# Patient Record
Sex: FEMALE | Race: WHITE | Employment: FULL TIME | URBAN - METROPOLITAN AREA
[De-identification: names, ages, dates, MRNs, and addresses within clinical notes are randomized per-mention and may not be internally consistent; named-entity substitution may affect disease eponyms.]

---

## 2017-01-09 NOTE — PROGRESS NOTES
SUBJECTIVE:     CC: Radha Wilson is an 36 year old woman who presents for preventive health visit.     Healthy Habits:    Do you get at least three servings of calcium containing foods daily (dairy, green leafy vegetables, etc.)? yes    Amount of exercise or daily activities, outside of work: 0    Problems taking medications regularly No    Medication side effects: No    Have you had an eye exam in the past two years? no    Do you see a dentist twice per year? yes    Do you have sleep apnea, excessive snoring or daytime drowsiness?daytime drowsiness         Follow up depression - ran out of paxil due to lack of tablets in short term supply given - taking 2 per day and only given enough for 1 per day.  Feeling the effects.      Today's PHQ-2 Score:   PHQ-2 (  [employer]) 2017 1/3/2017   Q1: Little interest or pleasure in doing things - -   Q2: Feeling down, depressed or hopeless - -   PHQ-2 Score - -   Little interest or pleasure in doing things Several days Not at all   Feeling down, depressed or hopeless Not at all Not at all   PHQ-2 Score 1 0       Abuse: Current or Past(Physical, Sexual or Emotional)- No  Do you feel safe in your environment - Yes    Social History   Substance Use Topics     Smoking status: Never Smoker      Smokeless tobacco: Never Used      Comment: Nonsmoking household     Alcohol Use: No      Comment: Minimal     The patient does not drink >3 drinks per day nor >7 drinks per week.      Reviewed orders with patient.  Reviewed health maintenance and updated orders accordingly - Yes         Patient's last menstrual period was 2016.     Fasting: No   Td:Tdap        Last 3 Pap Results:   PAP (no units)   Date Value   2015 NIL   2011 NIL        HPV: neg           Cholesterol: CHOL      235   2016  HDL       46   2016  LDL      122   2016  TRIG      336   2016  CHOLHDLRATIO      4.2   2011      MMG: NA  Dexa:  No     Flex/colo:  No      Seat Belt: Yes    Sunscreen use: Yes   Calcium Intake: adeq   Health Care Directive: Yes   Sexually Active: Yes     Current contraception: oral contraceptives  History of abnormal Pap smear: Yes: see [facility]  Family history of colon/breast/ovarian cancer: No  Regular self breast exam: Yes  History of abnormal mammogram: No       Mammo Decision Support:  Mammogram not appropriate for this patient based on age.    Pertinent mammograms are reviewed under the imaging tab.  History of abnormal Pap smear:       ROS:  C: NEGATIVE for fever, chills, change in weight  I: NEGATIVE for worrisome rashes, moles or lesions  E: NEGATIVE for vision changes or irritation  ENT: NEGATIVE for ear, mouth and throat problems  R: NEGATIVE for significant cough or SOB  B: NEGATIVE for masses, tenderness or discharge  CV: NEGATIVE for chest pain, palpitations or peripheral edema  GI: NEGATIVE for nausea, abdominal pain, heartburn, or change in bowel habits  : NEGATIVE for unusual urinary or vaginal symptoms. Periods are regular.  M: NEGATIVE for significant arthralgias or myalgia  N: NEGATIVE for weakness, dizziness or paresthesias  P: NEGATIVE for changes in mood or affect    Problem list, Medication list, Allergies, and Medical/Social/Surgical histories reviewed in [facility] and updated as appropriate.  Labs reviewed in EPIC  BP Readings from Last 3 Encounters:   01/17/17 134/70   01/04/16 127/81   09/05/15 131/84    Wt Readings from Last 3 Encounters:   01/17/17 190 lb (86.183 kg)   01/04/16 187 lb (84.823 kg)   09/05/15 185 lb (83.915 kg)                  Patient Active Problem List   Diagnosis     CARDIOVASCULAR SCREENING; LDL GOAL LESS THAN 160     Mild major depression (H)     Anxiety     Advanced directives, counseling/discussion     Myopia     Past Surgical History   Procedure Laterality Date     Hc tooth extraction w/forcep       Tonsillectomy & adenoidectomy       Exam of vagina,colposcopy  9/20/06     Benign     Appendectomy   "5/19/12     [facility]     Abdomen surgery         Social History   Substance Use Topics     Smoking status: Never Smoker      Smokeless tobacco: Never Used      Comment: Nonsmoking household     Alcohol Use: No      Comment: Minimal     Family History   Problem Relation Age of Onset     CANCER Mother      lymphoma     Lipids Father      HEART DISEASE Maternal Grandfather      70s     Cancer - colorectal Paternal Grandmother      80s     Glaucoma Paternal Grandmother      Hyperlipidemia Father      Other Cancer Mother      1994-current     Anesthesia Reaction Other      lung problems during appendectomy         Current Outpatient Prescriptions   Medication Sig Dispense Refill     PARoxetine (PAXIL) 20 MG tablet Take 2 tablets (40 mg) by mouth At Bedtime 180 tablet 1     levonorgestrel-ethinyl estradiol (QUASENSE) 0.15-0.03 MG per tablet Take 1 tablet by mouth daily Pt on continuous ocp, skip week 4 of packs 1-3, take week 4 of pack 4 112 tablet 3     hydroquinone 4 % CREA 5% cream       [DISCONTINUED] citalopram (CELEXA) 20 MG tablet Take 1 tablet by mouth daily. 90 tablet 1     OBJECTIVE:     /70 mmHg  Pulse 103  Temp(Src) 98.8  F (37.1  C) (Oral)  Ht 5' 3\" (1.6 m)  Wt 190 lb (86.183 kg)  BMI 33.67 kg/m2  LMP 11/30/2016  EXAM:  GENERAL: healthy, alert and no distress  EYES: Eyes grossly normal to inspection, PERRL and conjunctivae and sclerae normal  HENT: ear canals and TM's normal, nose and mouth without ulcers or lesions  NECK: no adenopathy, no asymmetry, masses, or scars and thyroid normal to palpation  RESP: lungs clear to auscultation - no rales, rhonchi or wheezes  BREAST: normal without masses, tenderness or nipple discharge and no palpable axillary masses or adenopathy  CV: regular rate and rhythm, normal S1 S2, no S3 or S4, no murmur, click or rub, no peripheral edema and peripheral pulses strong  ABDOMEN: soft, nontender, no hepatosplenomegaly, no masses and bowel sounds normal  MS: no " "gross musculoskeletal defects noted, no edema  SKIN: no suspicious lesions or rashes  NEURO: Normal strength and tone, mentation intact and speech normal  PSYCH: mentation appears normal, affect normal/bright    ASSESSMENT/PLAN:     (Z00.00) Routine general medical examination at a health care facility  (primary encounter diagnosis)  Comment: preventive needs reviewed  Plan: see orders in Epic.   Dicussed need for wt loss    (F41.9) Anxiety  (F33.0) Major depressive disorder, recurrent episode, mild (H)  Comment: not fully controlled due to low dose  Plan: PARoxetine (PAXIL) 20 MG tablet        Return to 40 mg daily.   Refill x 6 months     (Z30.41) Encounter for surveillance of contraceptive pills  Comment: stable  Plan: levonorgestrel-ethinyl estradiol (QUASENSE)         0.15-0.03 MG per tablet        Refill x 1 yr     (Z23) Need for prophylactic vaccination and inoculation against influenza  Comment: due  Plan: FLU VAC, SPLIT VIRUS IM > 3 YO (QUADRIVALENT)         [13545], Vaccine Administration, Initial         [81049]              COUNSELING:   Reviewed preventive health counseling, as reflected in patient instructions  Special attention given to:        Regular exercise       Healthy diet/nutrition    BP Screening:   Last 3 BP Readings:    BP Readings from Last 3 Encounters:   01/17/17 134/70   01/04/16 127/81   09/05/15 131/84       The following was recommended to the patient:  Re-screen BP within a year and recommended lifestyle modifications       reports that she has never smoked. She has never used smokeless tobacco.    Estimated body mass index is 33.67 kg/(m^2) as calculated from the following:    Height as of this encounter: 5' 3\" (1.6 m).    Weight as of this encounter: 190 lb (86.183 kg).   Weight management plan: Discussed healthy diet and exercise guidelines and patient will follow up in 12 months in clinic to re-evaluate.    Counseling Resources:  ATP IV Guidelines  Pooled Cohorts Equation " Calculator  Breast Cancer Risk Calculator  FRAX Risk Assessment  ICSI Preventive Guidelines  Dietary Guidelines for Americans, 2010  [email or web address]'s MyPlate  ASA Prophylaxis  Lung CA Screening    Katy Moore MD  [facility]

## 2017-01-09 NOTE — PATIENT INSTRUCTIONS
Increase activity, taper your pop intake.    Preventive Health Recommendations  Female Ages 26 - 39  Yearly exam:   See your health care provider every year in order to    Review health changes.     Discuss preventive care.      Review your medicines if you your doctor has prescribed any.    Until age 30: Get a Pap test every three years (more often if you have had an abnormal result).    After age 30: Talk to your doctor about whether you should have a Pap test every 3 years or have a Pap test with HPV screening every 5 years.   You do not need a Pap test if your uterus was removed (hysterectomy) and you have not had cancer.  You should be tested each year for STDs (sexually transmitted diseases), if you're at risk.   Talk to your provider about how often to have your cholesterol checked.  If you are at risk for diabetes, you should have a diabetes test (fasting glucose).  Shots: Get a flu shot each year. Get a tetanus shot every 10 years.   Nutrition:     Eat at least 5 servings of fruits and vegetables each day.    Eat whole-grain bread, whole-wheat pasta and brown rice instead of white grains and rice.    Talk to your provider about Calcium and Vitamin D.     Lifestyle    Exercise at least 150 minutes a week (30 minutes a day, 5 days of the week). This will help you control your weight and prevent disease.    Limit alcohol to one drink per day.    No smoking.     Wear sunscreen to prevent skin cancer.    See your dentist every six months for an exam and cleaning.

## 2017-01-09 NOTE — PROGRESS NOTES
"   SUBJECTIVE:     CC: Radha Wilson is an 36 year old woman who presents for preventive health visit.     Healthy Habits:    Do you get at least three servings of calcium containing foods daily (dairy, green leafy vegetables, etc.)? {YES/NO, DAIRY INTAKE:039063::\"yes\"}    Amount of exercise or daily activities, outside of work: {AMOUNT EXERCISE:603071}    Problems taking medications regularly {Yes /No default:116837::\"No\"}    Medication side effects: {Yes /No default.:674764::\"No\"}    Have you had an eye exam in the past two years? {YESNOBLANK:480483}    Do you see a dentist twice per year? {YESNOBLANK:057529}    Do you have sleep apnea, excessive snoring or daytime drowsiness?{YESNOBLANK:613681}    {Outside tests to abstract? :568969}    {additional problems to add:020945}    Today's PHQ-2 Score:   PHQ-2 ( 1999 [employer]) 1/14/2017 1/3/2017   Q1: Little interest or pleasure in doing things - -   Q2: Feeling down, depressed or hopeless - -   PHQ-2 Score - -   Little interest or pleasure in doing things Several days Not at all   Feeling down, depressed or hopeless Not at all Not at all   PHQ-2 Score 1 0     {PHQ-2 LOOK IN ASSESSMENTS :374918}  Abuse: Current or Past(Physical, Sexual or Emotional)- {YES/NO/NA:609387}  Do you feel safe in your environment - {YES/NO/NA:964469}    Social History   Substance Use Topics     Smoking status: Never Smoker      Smokeless tobacco: Never Used      Comment: Nonsmoking household     Alcohol Use: No      Comment: Minimal     {ETOH AUDIT:131430}      Reviewed orders with patient.  Reviewed health maintenance and updated orders accordingly - {Yes/No:697654::\"Yes\"}  G 0 P 0   No LMP recorded.     Fasting: {YES:722682}   Td: tdap 2010       Last 3 Pap Results:   PAP (no units)   Date Value   01/06/2015 NIL   12/16/2011 NIL        HPV: neg 2015          Cholesterol: CHOL      235   9/19/2016  HDL       46   9/19/2016  LDL      122   9/19/2016  TRIG      336   9/19/2016  CHOLHDLRATIO    "   4.2   11/14/2011      MMG: NA  Dexa:  NA     Flex/colo: NA      Seat Belt: {YES:819408}   Sunscreen use: {YES:589491}  Calcium Intake: ***   Health Care Directive: Yes   Sexually Active: Yes     Current contraception: oral contraceptives  History of abnormal Pap smear: Yes: see [facility]  Family history of colon/breast/ovarian cancer: Yes: see [facility]  Regular self breast exam: Yes  History of abnormal mammogram: No      Mammo Decision Support:  Mammogram not appropriate for this patient based on age.    Pertinent mammograms are reviewed under the imaging tab.  ROS:  {normal premenopausal female:119529}    Problem list, Medication list, Allergies, and Medical/Social/Surgical histories reviewed in [facility] and updated as appropriate.  Labs reviewed in EPIC  BP Readings from Last 3 Encounters:   01/04/16 127/81   09/05/15 131/84   01/06/15 130/80    Wt Readings from Last 3 Encounters:   01/04/16 187 lb (84.823 kg)   09/05/15 185 lb (83.915 kg)   01/06/15 184 lb (83.462 kg)                  Patient Active Problem List   Diagnosis     CARDIOVASCULAR SCREENING; LDL GOAL LESS THAN 160     Mild major depression (H)     Anxiety     Advanced directives, counseling/discussion     Myopia     Past Surgical History   Procedure Laterality Date     Hc tooth extraction w/forcep       Tonsillectomy & adenoidectomy       Exam of vagina,colposcopy  9/20/06     Benign     Appendectomy  5/19/12     [facility]     Abdomen surgery         Social History   Substance Use Topics     Smoking status: Never Smoker      Smokeless tobacco: Never Used      Comment: Nonsmoking household     Alcohol Use: No      Comment: Minimal     Family History   Problem Relation Age of Onset     CANCER Mother      lymphoma     Lipids Father      HEART DISEASE Maternal Grandfather      70s     Cancer - colorectal Paternal Grandmother      80s     Glaucoma Paternal Grandmother      Hyperlipidemia Father      Other Cancer Mother      1994-current     Anesthesia Reaction  "Other      lung problems during appendectomy         Current Outpatient Prescriptions   Medication Sig Dispense Refill     PARoxetine (PAXIL) 20 MG tablet Take 2 tablets (40 mg) by mouth At Bedtime Need to keep upcoming appointment for further refills 10 tablet 0     levonorgestrel-ethinyl estradiol (QUASENSE) 0.15-0.03 MG per tablet Take 1 tablet by mouth daily Pt on continuous ocp, skip week 4 of packs 1-3, take week 4 of pack 4 112 tablet 3     [DISCONTINUED] citalopram (CELEXA) 20 MG tablet Take 1 tablet by mouth daily. 90 tablet 1     OBJECTIVE:     There were no vitals taken for this visit.  EXAM:  {Exam Choices:543048}    ASSESSMENT/PLAN:     {Diag Picklist:617794}    COUNSELING:   Reviewed preventive health counseling, as reflected in patient instructions  Special attention given to:        Regular exercise       Healthy diet/nutrition    {Blood Pressure Screenin}     reports that she has never smoked. She has never used smokeless tobacco.    Estimated body mass index is 33.38 kg/(m^2) as calculated from the following:    Height as of 16: 5' 2.75\" (1.594 m).    Weight as of 16: 187 lb (84.823 kg).   {Weight Management Plan needed for ACO:596558}    Counseling Resources:  ATP IV Guidelines  Pooled Cohorts Equation Calculator  Breast Cancer Risk Calculator  FRAX Risk Assessment  ICSI Preventive Guidelines  Dietary Guidelines for Americans,   USDA's MyPlate  ASA Prophylaxis  Lung CA Screening    Katy Moore MD  [facility]  "

## 2017-01-17 ENCOUNTER — OFFICE VISIT (OUTPATIENT)
Dept: FAMILY MEDICINE | Facility: CLINIC | Age: 37
End: 2017-01-17
Payer: COMMERCIAL

## 2017-01-17 VITALS
HEART RATE: 103 BPM | TEMPERATURE: 98.8 F | WEIGHT: 190 LBS | SYSTOLIC BLOOD PRESSURE: 134 MMHG | BODY MASS INDEX: 33.66 KG/M2 | HEIGHT: 63 IN | DIASTOLIC BLOOD PRESSURE: 70 MMHG

## 2017-01-17 DIAGNOSIS — Z00.00 ROUTINE GENERAL MEDICAL EXAMINATION AT A HEALTH CARE FACILITY: Primary | ICD-10-CM

## 2017-01-17 DIAGNOSIS — F41.9 ANXIETY: ICD-10-CM

## 2017-01-17 DIAGNOSIS — Z30.41 ENCOUNTER FOR SURVEILLANCE OF CONTRACEPTIVE PILLS: ICD-10-CM

## 2017-01-17 DIAGNOSIS — Z23 NEED FOR PROPHYLACTIC VACCINATION AND INOCULATION AGAINST INFLUENZA: ICD-10-CM

## 2017-01-17 DIAGNOSIS — F33.0 MAJOR DEPRESSIVE DISORDER, RECURRENT EPISODE, MILD (H): ICD-10-CM

## 2017-01-17 PROCEDURE — 90686 IIV4 VACC NO PRSV 0.5 ML IM: CPT | Performed by: FAMILY MEDICINE

## 2017-01-17 PROCEDURE — 90471 IMMUNIZATION ADMIN: CPT | Performed by: FAMILY MEDICINE

## 2017-01-17 PROCEDURE — 99395 PREV VISIT EST AGE 18-39: CPT | Mod: 25 | Performed by: FAMILY MEDICINE

## 2017-01-17 RX ORDER — LEVONORGESTREL AND ETHINYL ESTRADIOL 0.15-0.03
1 KIT ORAL DAILY
Qty: 112 TABLET | Refills: 3 | Status: SHIPPED | OUTPATIENT
Start: 2017-01-17 | End: 2018-01-17

## 2017-01-17 RX ORDER — PAROXETINE 20 MG/1
40 TABLET, FILM COATED ORAL AT BEDTIME
Qty: 180 TABLET | Refills: 1 | Status: SHIPPED | OUTPATIENT
Start: 2017-01-17 | End: 2017-06-14

## 2017-01-17 RX ORDER — HYDROQUINONE 40 MG/G
CREAM TOPICAL
COMMUNITY
End: 2017-08-24

## 2017-01-17 ASSESSMENT — ANXIETY QUESTIONNAIRES
6. BECOMING EASILY ANNOYED OR IRRITABLE: MORE THAN HALF THE DAYS
IF YOU CHECKED OFF ANY PROBLEMS ON THIS QUESTIONNAIRE, HOW DIFFICULT HAVE THESE PROBLEMS MADE IT FOR YOU TO DO YOUR WORK, TAKE CARE OF THINGS AT HOME, OR GET ALONG WITH OTHER PEOPLE: VERY DIFFICULT
1. FEELING NERVOUS, ANXIOUS, OR ON EDGE: SEVERAL DAYS
5. BEING SO RESTLESS THAT IT IS HARD TO SIT STILL: MORE THAN HALF THE DAYS
2. NOT BEING ABLE TO STOP OR CONTROL WORRYING: NEARLY EVERY DAY
7. FEELING AFRAID AS IF SOMETHING AWFUL MIGHT HAPPEN: NOT AT ALL
GAD7 TOTAL SCORE: 12
3. WORRYING TOO MUCH ABOUT DIFFERENT THINGS: MORE THAN HALF THE DAYS

## 2017-01-17 ASSESSMENT — PATIENT HEALTH QUESTIONNAIRE - PHQ9: 5. POOR APPETITE OR OVEREATING: MORE THAN HALF THE DAYS

## 2017-01-17 NOTE — PROGRESS NOTES
Injectable Influenza Immunization Documentation    1.  Is the person to be vaccinated sick today?  No    2. Does the person to be vaccinated have an allergy to eggs or to a component of the vaccine?  No    3. Has the person to be vaccinated today ever had a serious reaction to influenza vaccine in the past?  No    4. Has the person to be vaccinated ever had Guillain-Mosby syndrome?  No     Form completed by   Negrita Webster CMA

## 2017-01-17 NOTE — NURSING NOTE
"Chief Complaint   Patient presents with     Physical       Initial /86 mmHg  Pulse 103  Temp(Src) 98.8  F (37.1  C) (Oral)  Ht 5' 3\" (1.6 m)  Wt 190 lb (86.183 kg)  BMI 33.67 kg/m2  LMP 11/30/2016 Estimated body mass index is 33.67 kg/(m^2) as calculated from the following:    Height as of this encounter: 5' 3\" (1.6 m).    Weight as of this encounter: 190 lb (86.183 kg).  BP completed using cuff size: karen Webster CMA      "

## 2017-01-17 NOTE — MR AVS SNAPSHOT
After Visit Summary   1/17/2017    Radha Wilson    MRN: [id number]           Patient Information     Date Of Birth          1980        Visit Information        Provider Department      1/17/2017 12:15 PM Katy Moore MD [facility]        Today's Diagnoses     Routine general medical examination at a health care facility    -  1     Anxiety         Major depressive disorder, recurrent episode, mild (H)         Encounter for surveillance of contraceptive pills         Need for prophylactic vaccination and inoculation against influenza           Care Instructions      Increase activity, taper your pop intake.    Preventive Health Recommendations  Female Ages 26 - 39  Yearly exam:   See your health care provider every year in order to    Review health changes.     Discuss preventive care.      Review your medicines if you your doctor has prescribed any.    Until age 30: Get a Pap test every three years (more often if you have had an abnormal result).    After age 30: Talk to your doctor about whether you should have a Pap test every 3 years or have a Pap test with HPV screening every 5 years.   You do not need a Pap test if your uterus was removed (hysterectomy) and you have not had cancer.  You should be tested each year for STDs (sexually transmitted diseases), if you're at risk.   Talk to your provider about how often to have your cholesterol checked.  If you are at risk for diabetes, you should have a diabetes test (fasting glucose).  Shots: Get a flu shot each year. Get a tetanus shot every 10 years.   Nutrition:     Eat at least 5 servings of fruits and vegetables each day.    Eat whole-grain bread, whole-wheat pasta and brown rice instead of white grains and rice.    Talk to your provider about Calcium and Vitamin D.     Lifestyle    Exercise at least 150 minutes a week (30 minutes a day, 5 days of the week). This will help you control your weight and prevent  "disease.    Limit alcohol to one drink per day.    No smoking.     Wear sunscreen to prevent skin cancer.    See your dentist every six months for an exam and cleaning.            Follow-ups after your visit        Who to contact     If you have questions or need follow up information about today's clinic visit or your schedule please contact [facility] directly at [phone].  Normal or non-critical lab and imaging results will be communicated to you by MyChart, letter or phone within 4 business days after the clinic has received the results. If you do not hear from us within 7 days, please contact the clinic through [employer] or phone. If you have a critical or abnormal lab result, we will notify you by phone as soon as possible.  Submit refill requests through [employer] or call your pharmacy and they will forward the refill request to us. Please allow 3 business days for your refill to be completed.          Additional Information About Your Visit        [employer] Information     [employer] gives you secure access to your electronic health record. If you see a primary care provider, you can also send messages to your care team and make appointments. If you have questions, please call your primary care clinic.  If you do not have a primary care provider, please call [phone] and they will assist you.        Care EveryWhere ID     This is your Care EveryWhere ID. This could be used by other organizations to access your [place] medical records  [id number]        Your Vitals Were     Pulse Temperature Height BMI (Body Mass Index) Last Period       103 98.8  F (37.1  C) (Oral) 5' 3\" (1.6 m) 33.67 kg/m2 11/30/2016        Blood Pressure from Last 3 Encounters:   01/17/17 145/86   01/04/16 127/81   09/05/15 131/84    Weight from Last 3 Encounters:   01/17/17 190 lb (86.183 kg)   01/04/16 187 lb (84.823 kg)   09/05/15 185 lb (83.915 kg)              We Performed the Following     DEPRESSION ACTION PLAN (DAP) "     FLU VAC, SPLIT VIRUS IM > 3 YO (QUADRIVALENT) [87303]     Vaccine Administration, Initial [92857]          Today's Medication Changes          These changes are accurate as of: 1/17/17 12:49 PM.  If you have any questions, ask your nurse or doctor.               These medicines have changed or have updated prescriptions.        Dose/Directions    PARoxetine 20 MG tablet   Commonly known as:  PAXIL   This may have changed:  additional instructions   Used for:  Anxiety, Major depressive disorder, recurrent episode, mild (H)   Changed by:  Katy Moore MD        Dose:  40 mg   Take 2 tablets (40 mg) by mouth At Bedtime   Quantity:  180 tablet   Refills:  1            Where to get your medicines      These medications were sent to [facility] [id number] - [address]  AT [place] [address]  [address] [facility] [address]     Phone:  [phone]    - levonorgestrel-ethinyl estradiol 0.15-0.03 MG per tablet  - PARoxetine 20 MG tablet             Primary Care Provider Office Phone # Fax #    Katy Moore [phone]       [facility] [address] [facility] [address]        Thank you!     Thank you for choosing [facility]  for your care. Our goal is always to provide you with excellent care. Hearing back from our patients is one way we can continue to improve our services. Please take a few minutes to complete the written survey that you may receive in the mail after your visit with us. Thank you!             Your Updated Medication List - Protect others around you: Learn how to safely use, store and throw away your medicines at www.disposemymeds.org.          This list is accurate as of: 1/17/17 12:49 PM.  Always use your most recent med list.                   Brand Name Dispense Instructions for use    hydroquinone 4 % Crea      5% cream       levonorgestrel-ethinyl estradiol 0.15-0.03 MG per tablet     QUASENSE    112 tablet    Take 1 tablet by mouth daily Pt on continuous ocp, skip week 4 of packs 1-3, take week 4 of pack 4       PARoxetine 20 MG tablet    PAXIL    180 tablet    Take 2 tablets (40 mg) by mouth At Bedtime

## 2017-01-17 NOTE — Clinical Note
My Depression Action Plan  Name: Radha Wilson   Date of Birth 1980  Date: 1/17/2017    My doctor: Katy Moore   My clinic: [facility]  [id number] [place] [id number] [phone]          GREEN    ZONE   Good Control    What it looks like:     Things are going generally well. You have normal up s and down s. You may even feel depressed from time to time, but bad moods usually last less than a day.   What you need to do:  1. Continue to care for yourself (see self care plan)  2. Check your depression survival kit and update it as needed  3. Follow your physician s recommendations including any medication.  4. Do not stop taking medication unless you consult with your physician first.           YELLOW         ZONE Getting Worse    What it looks like:     Depression is starting to interfere with your life.     It may be hard to get out of bed; you may be starting to isolate yourself from others.    Symptoms of depression are starting to last most all day and this has happened for several days.     You may have suicidal thoughts but they are not constant.   What you need to do:     1. Call your care team, your response to treatment will improve if you keep your care team informed of your progress. Yellow periods are signs an adjustment may need to be made.     2. Continue your self-care, even if you have to fake it!    3. Talk to someone in your support network    4. Open up your depression survival kit           RED    ZONE Medical Alert - Get Help    What it looks like:     Depression is seriously interfering with your life.     You may experience these or other symptoms: You can t get out of bed most days, can t work or engage in other necessary activities, you have trouble taking care of basic hygiene, or basic responsibilities, thoughts of suicide or death that will not go away, self-injurious behavior.     What you need to do:  1. Call your care team and  request a same-day appointment. If they are not available (weekends or after hours) call your local crisis line, emergency room or 911.      Electronically signed by: Negrita Monahan, January 17, 2017    Depression Self Care Plan / Survival Kit    Self-Care for Depression  Here s the deal. Your body and mind are really not as separate as most people think.  What you do and think affects how you feel and how you feel influences what you do and think. This means if you do things that people who feel good do, it will help you feel better.  Sometimes this is all it takes.  There is also a place for medication and therapy depending on how severe your depression is, so be sure to consult with your medical provider and/ or Behavioral Health Consultant if your symptoms are worsening or not improving.     In order to better manage my stress, I will:    Exercise  Get some form of exercise, every day. This will help reduce pain and release endorphins, the  feel good  chemicals in your brain. This is almost as good as taking antidepressants!  This is not the same as joining a gym and then never going! (they count on that by the way ) It can be as simple as just going for a walk or doing some gardening, anything that will get you moving.      Hygiene   Maintain good hygiene (Get out of bed in the morning, Make your bed, Brush your teeth, Take a shower, and Get dressed like you were going to work, even if you are unemployed).  If your clothes don't fit try to get ones that do.    Diet  I will strive to eat foods that are good for me, drink plenty of water, and avoid excessive sugar, caffeine, alcohol, and other mood-altering substances.  Some foods that are helpful in depression are: complex carbohydrates, B vitamins, flaxseed, fish or fish oil, fresh fruits and vegetables.    Psychotherapy  I agree to participate in Individual Therapy (if recommended).    Medication  If prescribed medications, I agree to take them.  Missing  doses can result in serious side effects.  I understand that drinking alcohol, or other illicit drug use, may cause potential side effects.  I will not stop my medication abruptly without first discussing it with my provider.    Staying Connected With Others  I will stay in touch with my friends, family members, and my primary care provider/team.    Use your imagination  Be creative.  We all have a creative side; it doesn t matter if it s oil painting, sand castles, or mud pies! This will also kick up the endorphins.    Witness Beauty  (AKA stop and smell the roses) Take a look outside, even in mid-winter. Notice colors, textures. Watch the squirrels and birds.     Service to others  Be of service to others.  There is always someone else in need.  By helping others we can  get out of ourselves  and remember the really important things.  This also provides opportunities for practicing all the other parts of the program.    Humor  Laugh and be silly!  Adjust your TV habits for less news and crime-drama and more comedy.    Control your stress  Try breathing deep, massage therapy, biofeedback, and meditation. Find time to relax each day.     My support system    Clinic Contact:  Phone number:    Contact 1:  Phone number:    Contact 2:  Phone number:    Restoration/:  Phone number:    Therapist:  Phone number:    Local crisis center:    Phone number:    Other community support:  Phone number:

## 2017-01-18 ASSESSMENT — ANXIETY QUESTIONNAIRES: GAD7 TOTAL SCORE: 12

## 2017-01-18 ASSESSMENT — PATIENT HEALTH QUESTIONNAIRE - PHQ9: SUM OF ALL RESPONSES TO PHQ QUESTIONS 1-9: 14

## 2017-06-07 ENCOUNTER — TELEPHONE (OUTPATIENT)
Dept: FAMILY MEDICINE | Facility: CLINIC | Age: 37
End: 2017-06-07

## 2017-06-07 DIAGNOSIS — F41.9 ANXIETY: ICD-10-CM

## 2017-06-07 DIAGNOSIS — F33.0 MAJOR DEPRESSIVE DISORDER, RECURRENT EPISODE, MILD (H): ICD-10-CM

## 2017-06-07 NOTE — TELEPHONE ENCOUNTER
PHQ-9 due now for patient ( 5-7 months from index date)  Index date:       1-17-17  Index PHQ9 :   14  FU start date:   6-17-17  FU End date :   8-17-17    RN- Contact patient for PHQ-9. Remission considered if follow up PHQ-9 less than 5.  If greater than 5 consider follow up appointment, e-visit for medication follow up and evaluation.

## 2017-06-14 RX ORDER — PAROXETINE 20 MG/1
40 TABLET, FILM COATED ORAL AT BEDTIME
Qty: 60 TABLET | Refills: 0 | Status: SHIPPED | OUTPATIENT
Start: 2017-06-14 | End: 2017-07-10

## 2017-06-14 NOTE — TELEPHONE ENCOUNTER
PHQ-9 SCORE 10/24/2016 1/17/2017 6/14/2017   Total Score - - -   Total Score MyChart 4 (Minimal depression) - -   Total Score - 14 6     Patient's main complaint is sleep.  Patient requests appointment to discuss.  Appointment is schedule for 6/28/17.   Patient requests short term refill until appointment.   Medication refilled per RN protocol.  The patient/parent agrees with the plan and verbalized good understanding.  Trini Palomino RN

## 2017-06-15 ASSESSMENT — PATIENT HEALTH QUESTIONNAIRE - PHQ9: SUM OF ALL RESPONSES TO PHQ QUESTIONS 1-9: 6

## 2017-06-28 ENCOUNTER — E-VISIT (OUTPATIENT)
Dept: FAMILY MEDICINE | Facility: CLINIC | Age: 37
End: 2017-06-28
Payer: COMMERCIAL

## 2017-06-28 DIAGNOSIS — R53.83 OTHER FATIGUE: ICD-10-CM

## 2017-06-28 DIAGNOSIS — F32.0 MILD MAJOR DEPRESSION (H): Primary | ICD-10-CM

## 2017-06-28 DIAGNOSIS — F41.9 ANXIETY: ICD-10-CM

## 2017-06-28 PROCEDURE — 99444 ZZC PHYSICIAN ONLINE EVALUATION & MANAGEMENT SERVICE: CPT | Performed by: FAMILY MEDICINE

## 2017-06-28 NOTE — MR AVS SNAPSHOT
After Visit Summary   6/28/2017    Radha Wilson    MRN: [id number]           Patient Information     Date Of Birth          1980        Visit Information        Provider Department      6/28/2017 12:07 PM Katy Moore MD [facility]        Today's Diagnoses     Mild major depression (H)    -  1    Anxiety        Other fatigue           Follow-ups after your visit        Who to contact     If you have questions or need follow up information about today's clinic visit or your schedule please contact [facility] directly at [phone].  Normal or non-critical lab and imaging results will be communicated to you by [employer] [facility], letter or phone within 4 business days after the clinic has received the results. If you do not hear from us within 7 days, please contact the clinic through [facility] or phone. If you have a critical or abnormal lab result, we will notify you by phone as soon as possible.  Submit refill requests through [facility] or call your pharmacy and they will forward the refill request to us. Please allow 3 business days for your refill to be completed.          Additional Information About Your Visit        MyChart Information     [facility] gives you secure access to your electronic health record. If you see a primary care provider, you can also send messages to your care team and make appointments. If you have questions, please call your primary care clinic.  If you do not have a primary care provider, please call [phone] and they will assist you.        Care EveryWhere ID     This is your Care EveryWhere ID. This could be used by other organizations to access your [facility] medical records  [id number]         Blood Pressure from Last 3 Encounters:   01/17/17 134/70   01/04/16 127/81   09/05/15 131/84    Weight from Last 3 Encounters:   01/17/17 190 lb (86.2 kg)   01/04/16 187 lb (84.8 kg)   09/05/15 185 lb (83.9 kg)              Today, you had the  following     No orders found for display       Primary Care Provider Office Phone # Fax #    Katy Moore [phone]       [facility] [address] [facility] [address]        Equal Access to Services     HARI CORREA : Hadkandice garrison mirzao Soomaali, waaxda luqadaha, qaybta kaalmada adeegyada, anastasia sandiin hayaaenrique navaavery arias burt levi. So [facility] [phone].    ATENCIÓN: Si habla español, tiene a brewster disposición servicios gratuitos de asistencia lingüística. [facility] [phone].    We comply with applicable federal civil rights laws and [place] laws. We do not discriminate on the basis of race, color, national origin, age, disability sex, sexual orientation or gender identity.            Thank you!     Thank you for choosing [facility]  for your care. Our goal is always to provide you with excellent care. Hearing back from our patients is one way we can continue to improve our services. Please take a few minutes to complete the written survey that you may receive in the mail after your visit with us. Thank you!             Your Updated Medication List - Protect others around you: Learn how to safely use, store and throw away your medicines at www.disposemymeds.org.          This list is accurate as of: 6/28/17 11:59 PM.  Always use your most recent med list.                   Brand Name Dispense Instructions for use Diagnosis    hydroquinone 4 % Crea      5% cream        levonorgestrel-ethinyl estradiol 0.15-0.03 MG per tablet    QUASENSE    112 tablet    Take 1 tablet by mouth daily Pt on continuous ocp, skip week 4 of packs 1-3, take week 4 of pack 4    Encounter for surveillance of contraceptive pills       PARoxetine 20 MG tablet    PAXIL    60 tablet    Take 2 tablets (40 mg) by mouth At Bedtime Need to keep upcoming appointment for further refills    Anxiety, Major depressive disorder, recurrent episode, mild (H)

## 2017-07-06 ENCOUNTER — TELEPHONE (OUTPATIENT)
Dept: FAMILY MEDICINE | Facility: CLINIC | Age: 37
End: 2017-07-06

## 2017-07-07 ENCOUNTER — MYC MEDICAL ADVICE (OUTPATIENT)
Dept: FAMILY MEDICINE | Facility: CLINIC | Age: 37
End: 2017-07-07

## 2017-07-07 DIAGNOSIS — F33.0 MAJOR DEPRESSIVE DISORDER, RECURRENT EPISODE, MILD (H): ICD-10-CM

## 2017-07-07 DIAGNOSIS — F41.9 ANXIETY: ICD-10-CM

## 2017-07-07 DIAGNOSIS — R53.83 OTHER FATIGUE: Primary | ICD-10-CM

## 2017-07-10 RX ORDER — PAROXETINE 20 MG/1
50 TABLET, FILM COATED ORAL AT BEDTIME
Qty: 90 TABLET | Refills: 1 | Status: SHIPPED | OUTPATIENT
Start: 2017-07-10 | End: 2017-09-12

## 2017-07-21 ENCOUNTER — MYC MEDICAL ADVICE (OUTPATIENT)
Dept: FAMILY MEDICINE | Facility: CLINIC | Age: 37
End: 2017-07-21

## 2017-07-21 DIAGNOSIS — Z13.6 CARDIOVASCULAR SCREENING; LDL GOAL LESS THAN 160: ICD-10-CM

## 2017-07-21 DIAGNOSIS — Z13.6 CARDIOVASCULAR SCREENING; LDL GOAL LESS THAN 160: Primary | ICD-10-CM

## 2017-07-21 DIAGNOSIS — R73.01 ELEVATED FASTING GLUCOSE: ICD-10-CM

## 2017-07-21 DIAGNOSIS — R53.83 OTHER FATIGUE: ICD-10-CM

## 2017-07-21 LAB
ERYTHROCYTE [DISTWIDTH] IN BLOOD BY AUTOMATED COUNT: 12.7 % (ref 10–15)
HCT VFR BLD AUTO: 41.9 % (ref 35–47)
HGB BLD-MCNC: 14.3 G/DL (ref 11.7–15.7)
MCH RBC QN AUTO: 30.8 PG (ref 26.5–33)
MCHC RBC AUTO-ENTMCNC: 34.1 G/DL (ref 31.5–36.5)
MCV RBC AUTO: 90 FL (ref 78–100)
PLATELET # BLD AUTO: 274 10E9/L (ref 150–450)
RBC # BLD AUTO: 4.64 10E12/L (ref 3.8–5.2)
WBC # BLD AUTO: 8 10E9/L (ref 4–11)

## 2017-07-21 PROCEDURE — 80061 LIPID PANEL: CPT | Performed by: FAMILY MEDICINE

## 2017-07-21 PROCEDURE — 36415 COLL VENOUS BLD VENIPUNCTURE: CPT | Performed by: FAMILY MEDICINE

## 2017-07-21 PROCEDURE — 85027 COMPLETE CBC AUTOMATED: CPT | Performed by: FAMILY MEDICINE

## 2017-07-21 PROCEDURE — 84443 ASSAY THYROID STIM HORMONE: CPT | Performed by: FAMILY MEDICINE

## 2017-07-21 PROCEDURE — 82947 ASSAY GLUCOSE BLOOD QUANT: CPT | Performed by: FAMILY MEDICINE

## 2017-07-22 LAB — TSH SERPL DL<=0.005 MIU/L-ACNC: 2.35 MU/L (ref 0.4–4)

## 2017-07-24 LAB
CHOLEST SERPL-MCNC: 238 MG/DL
CHOLEST SERPL-MCNC: NORMAL MG/DL
GLUCOSE SERPL-MCNC: 137 MG/DL (ref 70–99)
GLUCOSE SERPL-MCNC: NORMAL MG/DL (ref 70–99)
HDLC SERPL-MCNC: 46 MG/DL
HDLC SERPL-MCNC: NORMAL MG/DL
LDLC SERPL CALC-MCNC: 128 MG/DL (ref 0–130)
LDLC SERPL CALC-MCNC: NORMAL MG/DL (ref 0–130)
NONHDLC SERPL-MCNC: 192 MG/DL
NONHDLC SERPL-MCNC: NORMAL MG/DL
TRIGL SERPL-MCNC: 319 MG/DL
TRIGL SERPL-MCNC: NORMAL MG/DL

## 2017-07-28 DIAGNOSIS — R73.01 ELEVATED FASTING GLUCOSE: ICD-10-CM

## 2017-07-28 LAB — HBA1C MFR BLD: 7.1 % (ref 4.3–6)

## 2017-07-28 PROCEDURE — 83036 HEMOGLOBIN GLYCOSYLATED A1C: CPT | Performed by: FAMILY MEDICINE

## 2017-07-28 PROCEDURE — 36415 COLL VENOUS BLD VENIPUNCTURE: CPT | Performed by: FAMILY MEDICINE

## 2017-08-03 ENCOUNTER — MYC MEDICAL ADVICE (OUTPATIENT)
Dept: NURSING | Facility: CLINIC | Age: 37
End: 2017-08-03

## 2017-08-03 ENCOUNTER — TELEPHONE (OUTPATIENT)
Dept: FAMILY MEDICINE | Facility: CLINIC | Age: 37
End: 2017-08-03

## 2017-08-03 ASSESSMENT — PATIENT HEALTH QUESTIONNAIRE - PHQ9
SUM OF ALL RESPONSES TO PHQ QUESTIONS 1-9: 8
10. IF YOU CHECKED OFF ANY PROBLEMS, HOW DIFFICULT HAVE THESE PROBLEMS MADE IT FOR YOU TO DO YOUR WORK, TAKE CARE OF THINGS AT HOME, OR GET ALONG WITH OTHER PEOPLE: NOT DIFFICULT AT ALL
SUM OF ALL RESPONSES TO PHQ QUESTIONS 1-9: 8

## 2017-08-03 NOTE — TELEPHONE ENCOUNTER
PHQ-9 SCORE 10/24/2016 1/17/2017 6/14/2017   Total Score - - -   Total Score MyChart 4 (Minimal depression) - -   Total Score - 14 6     MyChart message sent to pt to complete PHQ9  Trini Palomino RN

## 2017-08-04 ASSESSMENT — PATIENT HEALTH QUESTIONNAIRE - PHQ9: SUM OF ALL RESPONSES TO PHQ QUESTIONS 1-9: 8

## 2017-08-04 NOTE — TELEPHONE ENCOUNTER
PHQ-9 SCORE 1/17/2017 6/14/2017 8/3/2017   Total Score - - -   Total Score MyChart - - 8 (Mild depression)   Total Score 14 6 8     MyChart sent to patient if wanting to follow up.  See message for details.  .Trini Palomino RN

## 2017-08-04 NOTE — TELEPHONE ENCOUNTER
TRINHI.  Next 5 appointments (look out 90 days)     Aug 24, 2017  1:35 PM CDT   Sid Cruz with Katy Moore MD   [facility]    [address] [facility] [address]   [phone]                Trini Palomino RN

## 2017-08-22 NOTE — PROGRESS NOTES
SUBJECTIVE:   Radha Wilson is a 37 year old female who presents to clinic today for the following health issues:      Hyperlipidemia Follow-Up      Rate your low fat/cholesterol diet?: fair    Taking statin?  No    Other lipid medications/supplements?:  none    Discuss HGBA1C results A1C up   Pt would like to discuss LLQ abdominal pain when she twists or moves   Would like to get the shingles vaccine pt checked with insurance it is covered       Amount of exercise or physical activity: 2 days week     Problems taking medications regularly: No    Medication side effects: none  Diet: carbohydrate counting and cutting sugar           Problem list and histories reviewed & adjusted, as indicated.  Additional history: as documented    Patient Active Problem List   Diagnosis     CARDIOVASCULAR SCREENING; LDL GOAL LESS THAN 160     Mild major depression (H)     Anxiety     Advanced directives, counseling/discussion     Myopia     Past Surgical History:   Procedure Laterality Date     ABDOMEN SURGERY       APPENDECTOMY  5/19/12    [facility]     EXAM OF VAGINA,COLPOSCOPY  9/20/06    Benign     HC TOOTH EXTRACTION W/FORCEP       TONSILLECTOMY & ADENOIDECTOMY         Social History   Substance Use Topics     Smoking status: Never Smoker     Smokeless tobacco: Never Used      Comment: Nonsmoking household     Alcohol use No      Comment: Minimal     Family History   Problem Relation Age of Onset     CANCER Mother      lymphoma     Other Cancer Mother      1994-current     Lipids Father      Hyperlipidemia Father      HEART DISEASE Maternal Grandfather      70s     Cancer - colorectal Paternal Grandmother      80s     Glaucoma Paternal Grandmother      Anesthesia Reaction Other      lung problems during appendectomy         Current Outpatient Prescriptions   Medication Sig Dispense Refill     blood glucose monitoring (NO BRAND SPECIFIED) meter device kit Use to test blood sugar 2 times daily or as directed. Preferred  blood glucose meter OR supplies to accompany: Blood Glucose Monitor Brands: per insurance. 1 kit 0     blood glucose monitoring (NO BRAND SPECIFIED) test strip Use to test blood sugar 2 times daily or as directed. To accompany: Blood Glucose Monitor Brands: per insurance. 100 strip 6     blood glucose calibration (NO BRAND SPECIFIED) solution To accompany: Blood Glucose Monitor Brands: per insurance. 1 Bottle 3     thin (NO BRAND SPECIFIED) lancets Use with lanceting device. To accompany: Blood Glucose Monitor Brands: per insurance. 200 each 6     alcohol swab prep pads Use to swab area of injection/mike as directed. 100 each 3     PARoxetine (PAXIL) 20 MG tablet Take 2.5 tablets (50 mg) by mouth At Bedtime Need to keep upcoming appointment for further refills 90 tablet 1     levonorgestrel-ethinyl estradiol (QUASENSE) 0.15-0.03 MG per tablet Take 1 tablet by mouth daily Pt on continuous ocp, skip week 4 of packs 1-3, take week 4 of pack 4 112 tablet 3     [DISCONTINUED] citalopram (CELEXA) 20 MG tablet Take 1 tablet by mouth daily. 90 tablet 1     Recent Labs   Lab Test  08/24/17   1413  07/28/17   1053  07/21/17   1014  09/19/16   0736  11/14/11   0951   11/06/09   1013   A1C   --   7.1*   --    --    --    --    --    LDL   --    --   128  Test canceled after completion  CORRECTED ON 07/24 AT 1113: PREVIOUSLY REPORTED  Above desirable:  100 129   mg/dl Borderline High:  130 159 mg/dL High:             160 189 mg/dL Very   high:       >189 mg/dl    122*  129   < >  140*   HDL   --    --   46*  Test canceled after completion  CORRECTED ON 07/24 AT 1113: PREVIOUSLY REPORTED AS 46    46*  52   < >  63   TRIG   --    --   319*  Test canceled after completion  CORRECTED ON 07/24 AT 1113: PREVIOUSLY REPORTED  Borderline high:  150 199   mg/dl High:             200 499 mg/dl Very high:       >499 mg/dl    336*  203*   < >  115   ALT   --    --    --    --    --    --   22   CR  0.83   --    --    --    --     --   0.81   GFRESTIMATED  76   --    --    --    --    --   82   GFRESTBLACK  >90   --    --    --    --    --   >90   POTASSIUM  4.0   --    --    --    --    --   4.3   TSH   --    --   2.35   --    --    --    --     < > = values in this interval not displayed.      BP Readings from Last 3 Encounters:   08/24/17 129/75   01/17/17 134/70   01/04/16 127/81    Wt Readings from Last 3 Encounters:   08/24/17 177 lb (80.3 kg)   01/17/17 190 lb (86.2 kg)   01/04/16 187 lb (84.8 kg)                          Reviewed and updated as needed this visit by clinical staffTobacco  Allergies  Meds  Problems       Reviewed and updated as needed this visit by Provider  Allergies  Meds  Problems         ROS:  Constitutional, HEENT, cardiovascular, pulmonary, gi and gu systems are negative, except as otherwise noted.      OBJECTIVE:   /75  Pulse 89  Temp 99  F (37.2  C) (Oral)  Wt 177 lb (80.3 kg)  BMI 31.35 kg/m2  Body mass index is 31.35 kg/(m^2).  GENERAL: healthy, alert and no distress  EYES: Eyes grossly normal to inspection, PERRL and conjunctivae and sclerae normal  NECK: no adenopathy, no asymmetry, masses, or scars and thyroid normal to palpation  RESP: lungs clear to auscultation - no rales, rhonchi or wheezes  CV: regular rate and rhythm, normal S1 S2, no S3 or S4, no murmur, click or rub, no peripheral edema and peripheral pulses strong  MS: no gross musculoskeletal defects noted, no edema  SKIN: no suspicious lesions or rashes  PSYCH: mentation appears normal, affect normal/bright              Foot Exam: Areas of numbess as outlined above  Right Foot none  Left Foot none  normal DP and PT pulses, no trophic changes or ulcerative lesions, normal sensory exam and normal monofilament exam   Diagnostic Test Results:  none     ASSESSMENT/PLAN:     (E11.9,  Z79.4) Controlled type 2 diabetes mellitus without complication, with long-term current use of insulin (H)  (primary encounter diagnosis)  Comment: A1C  in diabetic range  Plan: Albumin Random Urine Quantitative with Creat         Ratio, Basic metabolic panel  (Ca, Cl, CO2,         Creat, Gluc, K, Na, BUN), DIABETES EDUCATOR         REFERRAL, blood glucose monitoring (NO BRAND         SPECIFIED) meter device kit, blood glucose         monitoring (NO BRAND SPECIFIED) test strip,         blood glucose calibration (NO BRAND SPECIFIED)         solution, thin (NO BRAND SPECIFIED) lancets,         alcohol swab prep pads, FOOT EXAM, **A1C FUTURE        3mo        Discussed how wt loss and exercise can help put diabetes into remission.  Pt already making nutrition and activity changes that have resulted in wt loss.  Will attend diab ed and start check glucose.   Will hold on starting medication at this time due to recent wt loss efforts.  Recheck a1c in 3 months.        (Z23) Need for zoster vaccination  Comment: wants completed  Plan: ZOSTER VACC LIVE SUBQ NJX, ADMIN 1st VACCINE            (Z23) Need for pneumococcal vaccination  Comment: due  Plan: Pneumococcal vaccine 13 valent PCV13 IM         (Prevnar) [45851], ADMIN: Vaccine, Initial         (01356), ADMIN MEDICARE: Pneumococcal Vaccine         ()              See Patient Instructions    Katy Moore MD  [facility]

## 2017-08-24 ENCOUNTER — OFFICE VISIT (OUTPATIENT)
Dept: FAMILY MEDICINE | Facility: CLINIC | Age: 37
End: 2017-08-24
Payer: COMMERCIAL

## 2017-08-24 VITALS
DIASTOLIC BLOOD PRESSURE: 75 MMHG | TEMPERATURE: 99 F | BODY MASS INDEX: 31.35 KG/M2 | WEIGHT: 177 LBS | HEART RATE: 89 BPM | SYSTOLIC BLOOD PRESSURE: 129 MMHG

## 2017-08-24 DIAGNOSIS — Z79.4 CONTROLLED TYPE 2 DIABETES MELLITUS WITHOUT COMPLICATION, WITH LONG-TERM CURRENT USE OF INSULIN (H): Primary | ICD-10-CM

## 2017-08-24 DIAGNOSIS — Z23 NEED FOR ZOSTER VACCINATION: ICD-10-CM

## 2017-08-24 DIAGNOSIS — E11.9 CONTROLLED TYPE 2 DIABETES MELLITUS WITHOUT COMPLICATION, WITH LONG-TERM CURRENT USE OF INSULIN (H): Primary | ICD-10-CM

## 2017-08-24 DIAGNOSIS — Z23 NEED FOR PNEUMOCOCCAL VACCINATION: ICD-10-CM

## 2017-08-24 LAB
ANION GAP SERPL CALCULATED.3IONS-SCNC: 9 MMOL/L (ref 3–14)
BUN SERPL-MCNC: 11 MG/DL (ref 7–30)
CALCIUM SERPL-MCNC: 9.1 MG/DL (ref 8.5–10.1)
CHLORIDE SERPL-SCNC: 102 MMOL/L (ref 94–109)
CO2 SERPL-SCNC: 27 MMOL/L (ref 20–32)
CREAT SERPL-MCNC: 0.83 MG/DL (ref 0.52–1.04)
CREAT UR-MCNC: 97 MG/DL
GFR SERPL CREATININE-BSD FRML MDRD: 76 ML/MIN/1.7M2
GLUCOSE SERPL-MCNC: 145 MG/DL (ref 70–99)
MICROALBUMIN UR-MCNC: 15 MG/L
MICROALBUMIN/CREAT UR: 15.34 MG/G CR (ref 0–25)
POTASSIUM SERPL-SCNC: 4 MMOL/L (ref 3.4–5.3)
SODIUM SERPL-SCNC: 138 MMOL/L (ref 133–144)

## 2017-08-24 PROCEDURE — 99214 OFFICE O/P EST MOD 30 MIN: CPT | Mod: 25 | Performed by: FAMILY MEDICINE

## 2017-08-24 PROCEDURE — 90736 HZV VACCINE LIVE SUBQ: CPT | Performed by: FAMILY MEDICINE

## 2017-08-24 PROCEDURE — 90472 IMMUNIZATION ADMIN EACH ADD: CPT | Performed by: FAMILY MEDICINE

## 2017-08-24 PROCEDURE — 90670 PCV13 VACCINE IM: CPT | Performed by: FAMILY MEDICINE

## 2017-08-24 PROCEDURE — 90471 IMMUNIZATION ADMIN: CPT | Performed by: FAMILY MEDICINE

## 2017-08-24 PROCEDURE — 36415 COLL VENOUS BLD VENIPUNCTURE: CPT | Performed by: FAMILY MEDICINE

## 2017-08-24 PROCEDURE — 99207 C FOOT EXAM  NO CHARGE: CPT | Performed by: FAMILY MEDICINE

## 2017-08-24 PROCEDURE — 80048 BASIC METABOLIC PNL TOTAL CA: CPT | Performed by: FAMILY MEDICINE

## 2017-08-24 PROCEDURE — 82043 UR ALBUMIN QUANTITATIVE: CPT | Performed by: FAMILY MEDICINE

## 2017-08-24 RX ORDER — GLUCOSAMINE HCL/CHONDROITIN SU 500-400 MG
CAPSULE ORAL
Qty: 100 EACH | Refills: 3 | Status: SHIPPED | OUTPATIENT
Start: 2017-08-24 | End: 2021-06-11

## 2017-08-24 RX ORDER — LANCETS
EACH MISCELLANEOUS
Qty: 200 EACH | Refills: 6 | Status: SHIPPED | OUTPATIENT
Start: 2017-08-24 | End: 2021-06-11

## 2017-08-24 NOTE — NURSING NOTE
"Chief Complaint   Patient presents with     Diabetes     new      Lipids       Initial /75  Pulse 89  Temp 99  F (37.2  C) (Oral)  Wt 177 lb (80.3 kg)  BMI 31.35 kg/m2 Estimated body mass index is 31.35 kg/(m^2) as calculated from the following:    Height as of 1/17/17: 5' 3\" (1.6 m).    Weight as of this encounter: 177 lb (80.3 kg).  Medication Reconciliation: complete  Negrita Webster , CMA     "

## 2017-08-24 NOTE — MR AVS SNAPSHOT
After Visit Summary   8/24/2017    Radha Wilson    MRN: [id number]           Patient Information     Date Of Birth          1980        Visit Information        Provider Department      8/24/2017 1:35 PM Katy Moore MD [facility] [place]        Today's Diagnoses     Controlled type 2 diabetes mellitus without complication, with long-term current use of insulin (H)    -  1    Need for zoster vaccination        Need for pneumococcal vaccination          Care Instructions        Make diabetes education appointment when they call      Next labs are in 3 months.                    Follow-ups after your visit        Additional Services     DIABETES EDUCATOR REFERRAL       DIABETES SELF MANAGEMENT TRAINING (DSMT)      Your provider has referred you to Diabetes Education: [facility]: Diabetes Education - All [facility] [phone]   [email or web address]    Type of training and number of hours: New Diagnosis: Initial group DSMT - 10 hours.      Medicare covers: 10 hours of initial DSMT in 12 month period from the time of first visit, plus 2 hours of follow-up DSMT annually, and additional hours as requested for insulin training.    Diabetes Type: Type 2 - Diet Control             Diabetes Co-Morbidities: none               A1C Goal:  <7.0       A1C is: Lab Results       Component                Value               Date                       A1C                      7.1                 07/28/2017              If an urgent visit is needed or A1C is above 12, Care Team to call the Diabetes Education Team at [phone] or send an In Basket message to the Diabetes Education Pool (P DIAB ED-PATIENT CARE).    Diabetes Education Topics: Comprehensive Knowledge Assessment and Instruction    Special Educational Needs Requiring Individual DSMT: None       MEDICAL NUTRITION THERAPY (MNT) for Diabetes    Medical Nutrition Therapy with a Registered  Dietitian can be provided in coordination with Diabetes Self-Management Training to assist in achieving optimal diabetes management.    MNT Type and Hours: Do not initiate MNT at this time.                       Medicare will cover: 3 hours initial MNT in 12 month period after first visit, plus 2 hours of follow-up MNT annually    Please be aware that coverage of these services is subject to the terms and limitations of your health insurance plan.  Call member services at your health plan to determine Diabetes Self-Management Training benefits and ask which blood glucose monitor brands are covered by your plan.      Please bring the following with you to your appointment:    (1)  List of current medications   (2)  List of Blood Glucose Monitor brands that are covered by your insurance plan  (3)  Blood Glucose Monitor and log book  (4)   Food records for the 3 days prior to your visit    The Certified Diabetes Educator may make diabetes medication adjustments per the CDE Protocol and Collaborative Practice Agreement.                  Future tests that were ordered for you today     Open Future Orders        Priority Expected Expires Ordered    **A1C FUTURE 3mo Routine 11/22/2017 12/22/2017 8/24/2017            Who to contact     If you have questions or need follow up information about today's clinic visit or your schedule please contact [facility] directly at [phone].  Normal or non-critical lab and imaging results will be communicated to you by MyChart, letter or phone within 4 business days after the clinic has received the results. If you do not hear from us within 7 days, please contact the clinic through MyChart or phone. If you have a critical or abnormal lab result, we will notify you by phone as soon as possible.  Submit refill requests through [employer] or call your pharmacy and they will forward the refill request to us. Please allow 3 business days for your refill to be completed.           Additional Information About Your Visit        [employer] Information     [employer] gives you secure access to your electronic health record. If you see a primary care provider, you can also send messages to your care team and make appointments. If you have questions, please call your primary care clinic.  If you do not have a primary care provider, please call [phone] and they will assist you.        Care EveryWhere ID     This is your Care EveryWhere ID. This could be used by other organizations to access your [place] medical records  [id number]        Your Vitals Were     Pulse Temperature BMI (Body Mass Index)             89 99  F (37.2  C) (Oral) 31.35 kg/m2          Blood Pressure from Last 3 Encounters:   08/24/17 129/75   01/17/17 134/70   01/04/16 127/81    Weight from Last 3 Encounters:   08/24/17 177 lb (80.3 kg)   01/17/17 190 lb (86.2 kg)   01/04/16 187 lb (84.8 kg)              We Performed the Following     ADMIN 1st VACCINE     ADMIN MEDICARE: Pneumococcal Vaccine ()     ADMIN: Vaccine, Initial (52189)     Albumin Random Urine Quantitative with Creat Ratio     Basic metabolic panel  (Ca, Cl, CO2, Creat, Gluc, K, Na, BUN)     DIABETES EDUCATOR REFERRAL     FOOT EXAM     Pneumococcal vaccine 13 valent PCV13 IM (Prevnar) [89666]     ZOSTER VACC LIVE SUBQ NJX          Today's Medication Changes          These changes are accurate as of: 8/24/17  2:12 PM.  If you have any questions, ask your nurse or doctor.               Start taking these medicines.        Dose/Directions    alcohol swab prep pads   Used for:  Controlled type 2 diabetes mellitus without complication, with long-term current use of insulin (H)   Started by:  Katy Moore MD        Use to swab area of injection/mike as directed.   Quantity:  100 each   Refills:  3       blood glucose calibration solution   Commonly known as:  NO BRAND SPECIFIED   Used for:  Controlled type 2 diabetes mellitus without complication, with  long-term current use of insulin (H)   Started by:  Katy Moore MD        To accompany: Blood Glucose Monitor Brands: per insurance.   Quantity:  1 Bottle   Refills:  3       blood glucose monitoring meter device kit   Commonly known as:  no brand specified   Used for:  Controlled type 2 diabetes mellitus without complication, with long-term current use of insulin (H)   Started by:  Katy Moore MD        Use to test blood sugar 2 times daily or as directed. Preferred blood glucose meter OR supplies to accompany: Blood Glucose Monitor Brands: per insurance.   Quantity:  1 kit   Refills:  0       blood glucose monitoring test strip   Commonly known as:  no brand specified   Used for:  Controlled type 2 diabetes mellitus without complication, with long-term current use of insulin (H)   Started by:  Katy Moore MD        Use to test blood sugar 2 times daily or as directed. To accompany: Blood Glucose Monitor Brands: per insurance.   Quantity:  100 strip   Refills:  6       thin lancets   Commonly known as:  NO BRAND SPECIFIED   Used for:  Controlled type 2 diabetes mellitus without complication, with long-term current use of insulin (H)   Started by:  Katy Moore MD        Use with lanceting device. To accompany: Blood Glucose Monitor Brands: per insurance.   Quantity:  200 each   Refills:  6            Where to get your medicines      These medications were sent to [facility] [address] AT [facility] [address]  [facility], [facility] [address]     Phone:  [phone]     alcohol swab prep pads    blood glucose calibration solution    blood glucose monitoring test strip    thin lancets         Some of these will need a paper prescription and others can be bought over the counter.  Ask your nurse if you have questions.     Bring a paper prescription for each of these medications     blood glucose monitoring meter device kit                 Primary Care Provider Office Phone # Fax #    Katy Moore [phone] [address] [facility] [address]        Equal Access to Services     HARI CORREA : Sherly garrison mely Azar, waaxda luqadaha, qaybta kaalmada elly, anastasia arias laBobelizabeth levi. So [facility] [phone].    ATENCIÓN: Si habla español, tiene a brewster disposición servicios gratuitos de asistencia lingüística. Llame al [phone].    We comply with applicable federal civil rights laws and [place] laws. We do not discriminate on the basis of race, color, national origin, age, disability sex, sexual orientation or gender identity.            Thank you!     Thank you for choosing [facility]  for your care. Our goal is always to provide you with excellent care. Hearing back from our patients is one way we can continue to improve our services. Please take a few minutes to complete the written survey that you may receive in the mail after your visit with us. Thank you!             Your Updated Medication List - Protect others around you: Learn how to safely use, store and throw away your medicines at www.disposemymeds.org.          This list is accurate as of: 8/24/17  2:12 PM.  Always use your most recent med list.                   Brand Name Dispense Instructions for use Diagnosis    alcohol swab prep pads     100 each    Use to swab area of injection/mike as directed.    Controlled type 2 diabetes mellitus without complication, with long-term current use of insulin (H)       blood glucose calibration solution    NO BRAND SPECIFIED    1 Bottle    To accompany: Blood Glucose Monitor Brands: per insurance.    Controlled type 2 diabetes mellitus without complication, with long-term current use of insulin (H)       blood glucose monitoring meter device kit    no brand specified    1 kit    Use to test blood sugar 2 times daily or as directed. Preferred blood glucose meter  OR supplies to accompany: Blood Glucose Monitor Brands: per insurance.    Controlled type 2 diabetes mellitus without complication, with long-term current use of insulin (H)       blood glucose monitoring test strip    no brand specified    100 strip    Use to test blood sugar 2 times daily or as directed. To accompany: Blood Glucose Monitor Brands: per insurance.    Controlled type 2 diabetes mellitus without complication, with long-term current use of insulin (H)       levonorgestrel-ethinyl estradiol 0.15-0.03 MG per tablet    QUASENSE    112 tablet    Take 1 tablet by mouth daily Pt on continuous ocp, skip week 4 of packs 1-3, take week 4 of pack 4    Encounter for surveillance of contraceptive pills       PARoxetine 20 MG tablet    PAXIL    90 tablet    Take 2.5 tablets (50 mg) by mouth At Bedtime Need to keep upcoming appointment for further refills    Anxiety, Major depressive disorder, recurrent episode, mild (H)       thin lancets    NO BRAND SPECIFIED    200 each    Use with lanceting device. To accompany: Blood Glucose Monitor Brands: per insurance.    Controlled type 2 diabetes mellitus without complication, with long-term current use of insulin (H)

## 2017-09-12 DIAGNOSIS — F33.0 MAJOR DEPRESSIVE DISORDER, RECURRENT EPISODE, MILD (H): ICD-10-CM

## 2017-09-12 DIAGNOSIS — F41.9 ANXIETY: ICD-10-CM

## 2017-09-14 ENCOUNTER — ALLIED HEALTH/NURSE VISIT (OUTPATIENT)
Dept: EDUCATION SERVICES | Facility: CLINIC | Age: 37
End: 2017-09-14
Payer: COMMERCIAL

## 2017-09-14 VITALS — BODY MASS INDEX: 31.18 KG/M2 | WEIGHT: 176 LBS

## 2017-09-14 DIAGNOSIS — E11.9 TYPE 2 DIABETES MELLITUS WITHOUT COMPLICATIONS (H): Primary | ICD-10-CM

## 2017-09-14 PROCEDURE — G0108 DIAB MANAGE TRN  PER INDIV: HCPCS

## 2017-09-14 RX ORDER — PAROXETINE 20 MG/1
TABLET, FILM COATED ORAL
Qty: 225 TABLET | Refills: 0 | Status: SHIPPED | OUTPATIENT
Start: 2017-09-14 | End: 2017-12-18

## 2017-09-14 NOTE — PROGRESS NOTES
Diabetes Self Management Training: Initial Assessment Visit for Newly Diagnosed Patients (Complete AADE Goals Flowsheet)    Radha Wilson presents today for education related to Type 2 diabetes.    She is accompanied by self    Patient's diabetes management related comments/concerns: I need to learn how to eat    Patient's emotional response to diabetes: expresses readiness to learn    Patient would like this visit to be focused around the following diabetes-related behaviors and goals: Healthy Eating and Monitoring    ASSESSMENT:  Patient Problem List and Family Medical History reviewed for relevant medical history, current medical status, and diabetes risk factors.  Reports she has a goal to lose 40 lbs by her 38 th birthday next year in Feb.  She states it is not going to happen but was encouraged to work to lose 1-2 lbs per week to help improve her blood glucose control.    Current Diabetes Management per Patient:  Taking diabetes medications? no    *Abbreviated insulin dose documentation key: Insulin Trade Name (nlbbttwqs-ykluf-tbijjc-bedtime) - i.e. Humalog 5-5-5-0 (Humalog 5 units at breakfast, 5 units at lunch, and 5 units at dinner).    Past Diabetes Education: Newly diagnosed    Patient glucose self monitoring as follows: BG meter taught today.   BG meter: One Touch Ultra 2 meter  BG results: not available     BG values are: Not in goal  Patient's most recent   Lab Results   Component Value Date    A1C 7.1 07/28/2017    is not meeting goal of <7.0    Nutrition:  Patient eats 3 meals per day, has an inconsistent intake of carbohydrates, has a high intake of carbohydrates and kept 3 days of complete detailed food records - see food records attached to this visit.Recently has moved and has been eating poorly and eating out most often.  Gave up [employer] and regular soda since diagnosis.    Breakfast - 8:30  Cheerios with 1 cup of skim milk or skips if sleeping in when does not work that day.  Lunch - 1 pm-  " Frozen microwave meals often or Carlos A Johns or [employer] bagel sandwich 1/2 and chocolate milk  8 oz.  Dinner - 5-6 pm- Mexican food or pasta   ( 3.5 to 11 carb choices)  Snacks - 16 oz chocolate milk or 2 slices of watermelon or snack size [place]iana bar.    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 Plans to start to walk for 15 minutes after 2 meals per day meal or ride bike.    Diabetes Risk Factors:  overweight/obesity    Diabetes Complications:  Only discussed that elevated blood glucose is not healthy for the inside of her body.    Vitals:  Wt 176 lb (79.8 kg)  BMI 31.18 kg/m2  Estimated body mass index is 31.18 kg/(m^2) as calculated from the following:    Height as of 1/17/17: 5' 3\" (1.6 m).    Weight as of this encounter: 176 lb (79.8 kg).   Last 3 BP:   BP Readings from Last 3 Encounters:   08/24/17 129/75   01/17/17 134/70   01/04/16 127/81       History   Smoking Status     Never Smoker   Smokeless Tobacco     Never Used     Comment: Nonsmoking household       Labs:  Lab Results   Component Value Date    A1C 7.1 07/28/2017     Lab Results   Component Value Date     08/24/2017     Lab Results   Component Value Date    LDL  07/21/2017     Test canceled after completion  CORRECTED ON 07/24 AT 1113: PREVIOUSLY REPORTED  Above desirable:  100 129   mg/dl Borderline High:  130 159 mg/dL High:             160 189 mg/dL Very   high:       >189 mg/dl       07/21/2017     HDL Cholesterol   Date Value Ref Range Status   07/21/2017  >49 mg/dL Corrected    Test canceled after completion  CORRECTED ON 07/24 AT 1113: PREVIOUSLY REPORTED AS 46     07/21/2017 46 (L) >49 mg/dL Final   ]  GFR Estimate   Date Value " Ref Range Status   08/24/2017 76 >60 mL/min/1.7m2 Final     Comment:     Non  GFR Calc     GFR Estimate If Black   Date Value Ref Range Status   08/24/2017 >90 >60 mL/min/1.7m2 Final     Comment:      GFR Calc     Lab Results   Component Value Date    CR 0.83 08/24/2017     No results found for: MICROALBUMIN    Socio/Economic Considerations:    Support system: not assessed    Health Beliefs and Attitudes:   Patient Activation Measure Survey Score:  MANOJ Score (Last Two) 12/16/2011 7/18/2014   MANOJ Raw Score 50 43   Activation Score 86.3 68.5   MANOJ Level 4 4       Stage of Change: ACTION (Actively working towards change)      Diabetes knowledge and skills assessment:     Patient is knowledgeable in diabetes management concepts related to: none    Patient needs further education on the following diabetes management concepts: Healthy Eating, Being Active, Monitoring, Taking Medication, Problem Solving, Reducing Risks and Healthy Coping    Barriers to Learning Assessment: No Barriers identified    Based on learning assessment above, most appropriate setting for further diabetes education would be: Group class or Individual setting.    INTERVENTION:   Education provided today on:  AADE Self-Care Behaviors:  Healthy Eating: carbohydrate counting, consistency in amount, composition, and timing of food intake, weight reduction, portion control, plate planning method and label reading  Being Active: relationship to blood glucose  Monitoring: purpose, proper technique, log and interpret results, individual blood glucose targets, frequency of monitoring and proper sharps disposal  Healthy Coping: recognize feelings about diagnosis and benefits of making appropriate lifestyle changes  Patient was instructed on One Touch Ultra 2 meter ( which she brought in today)  and was able to provide an accurate return demonstration. Patient's blood glucose reading today was 247 mg/dl 1 hr pp.     Opportunities  for ongoing education and support in diabetes-self management were discussed.    Pt verbalized understanding of concepts discussed and recommendations provided today.       Education Materials Provided:  [place] Understanding Diabetes Booklet, Safe Disposal Options for Needles & Syringes, BG Log Sheet, Carbohydrate Counting and My Plate Planner    PLAN:  See Patient Instructions for co-developed, patient-stated behavior change goals.  AVS printed and provided to patient today.    FOLLOW-UP:  Scheduled for diabetes education group classes.  Patient will make final 1:1 appointment when closer to the time it is needed.  Chart routed to referring provider.    Ongoing plan for education and support: Diabetes Education group class(es) and as needed    Harriet Naranjo RN  BSN CDE    Time Spent: 60 minutes  Encounter Type: Individual    Any diabetes medication dose changes were made via the CDE Protocol and Collaborative Practice Agreement with the patient's referring provider. A copy of this encounter was shared with the provider.

## 2017-09-14 NOTE — MR AVS SNAPSHOT
After Visit Summary   9/14/2017    Radha Wilson    MRN: [id number]           Patient Information     Date Of Birth          1980        Visit Information        Provider Department      9/14/2017 9:30 AM  DIABETIC ED RESOURCE [facility]        Care Instructions    Recommend 3 carb choices for meals.  Recommend to walk after meals as discussed with MD.  Test blood glucose 2 times per day and record values.          Follow-ups after your visit        Your next 10 appointments already scheduled     Oct 05, 2017  4:00 PM CDT   Diabetic Education with FK CLASS ONE   [facility] ([facility]    [address] [address]   [phone]            Nov 02, 2017  4:00 PM CDT   Diabetic Education with FK CLASS TWO   [facility] ([facility])    [address] [address]   [phone]            Dec 07, 2017  4:00 PM CST   Diabetic Education with  CLASS THREE   [facility] ([facility]    [address] [address]   [phone]              Who to contact     If you have questions or need follow up information about today's clinic visit or your schedule please contact [facility] directly at [phone].  Normal or non-critical lab and imaging results will be communicated to you by Vinylminthart, letter or phone within 4 business days after the clinic has received the results. If you do not hear from us within 7 days, please contact the clinic through Vinylminthart or phone. If you have a critical or abnormal lab result, we will notify you by phone as soon as possible.  Submit refill requests through Secondbrain or call your pharmacy and they will forward the refill request to us. Please allow 3 business days for your refill to be completed.          Additional Information About Your Visit        Secondbrain Information     Secondbrain gives you secure  access to your electronic health record. If you see a primary care provider, you can also send messages to your care team and make appointments. If you have questions, please call your primary care clinic.  If you do not have a primary care provider, please call [phone] and they will assist you.        Care EveryWhere ID     This is your Care EveryWhere ID. This could be used by other organizations to access your [facility] medical records  [id number]        Your Vitals Were     BMI (Body Mass Index)                   31.18 kg/m2            Blood Pressure from Last 3 Encounters:   08/24/17 129/75   01/17/17 134/70   01/04/16 127/81    Weight from Last 3 Encounters:   09/14/17 176 lb (79.8 kg)   08/24/17 177 lb (80.3 kg)   01/17/17 190 lb (86.2 kg)              Today, you had the following     No orders found for display       Primary Care Provider Office Phone # Fax #    Katy Fiona Moore [phone] [id number] [address] [address]        Equal Access to Services     [facility]: Hadii aad ku hadasho Soomaali, waaxda luqadaha, qaybta kaalmada adeegyaelieser, anastasia dallas . So [facility] [phone].    ATENCIÓN: Si habla español, tiene a brewster disposición servicios gratuitos de asistencia lingüística. [facility] [phone].    We comply with applicable federal civil rights laws and [place] laws. We do not discriminate on the basis of race, color, national origin, age, disability sex, sexual orientation or gender identity.            Thank you!     Thank you for choosing [facility]  for your care. Our goal is always to provide you with excellent care. Hearing back from our patients is one way we can continue to improve our services. Please take a few minutes to complete the written survey that you may receive in the mail after your visit with us. Thank you!             Your Updated Medication List - Protect others around you: Learn how to safely  use, store and throw away your medicines at www.disposemymeds.org.          This list is accurate as of: 9/14/17 10:41 AM.  Always use your most recent med list.                   Brand Name Dispense Instructions for use Diagnosis    ACE/ARB NOT PRESCRIBED (INTENTIONAL)      Please choose reason not prescribed, below    Controlled type 2 diabetes mellitus without complication, with long-term current use of insulin (H)       alcohol swab prep pads     100 each    Use to swab area of injection/mike as directed.    Controlled type 2 diabetes mellitus without complication, with long-term current use of insulin (H)       blood glucose calibration solution    NO BRAND SPECIFIED    1 Bottle    To accompany: Blood Glucose Monitor Brands: per insurance.    Controlled type 2 diabetes mellitus without complication, with long-term current use of insulin (H)       blood glucose monitoring meter device kit    no brand specified    1 kit    Use to test blood sugar 2 times daily or as directed. Preferred blood glucose meter OR supplies to accompany: Blood Glucose Monitor Brands: per insurance.    Controlled type 2 diabetes mellitus without complication, with long-term current use of insulin (H)       blood glucose monitoring test strip    no brand specified    100 strip    Use to test blood sugar 2 times daily or as directed. To accompany: Blood Glucose Monitor Brands: per insurance.    Controlled type 2 diabetes mellitus without complication, with long-term current use of insulin (H)       levonorgestrel-ethinyl estradiol 0.15-0.03 MG per tablet    QUASENSE    112 tablet    Take 1 tablet by mouth daily Pt on continuous ocp, skip week 4 of packs 1-3, take week 4 of pack 4    Encounter for surveillance of contraceptive pills       PARoxetine 20 MG tablet    PAXIL    90 tablet    Take 2.5 tablets (50 mg) by mouth At Bedtime Need to keep upcoming appointment for further refills    Anxiety, Major depressive disorder, recurrent  episode, mild (H)       STATIN NOT PRESCRIBED (INTENTIONAL)      Please choose reason not prescribed, below    Controlled type 2 diabetes mellitus without complication, with long-term current use of insulin (H)       thin lancets    NO BRAND SPECIFIED    200 each    Use with lanceting device. To accompany: Blood Glucose Monitor Brands: per insurance.    Controlled type 2 diabetes mellitus without complication, with long-term current use of insulin (H)

## 2017-09-14 NOTE — PATIENT INSTRUCTIONS
Recommend 3 carb choices for meals.  Recommend to walk after meals as discussed with MD.  Test blood glucose 2 times per day and record values.

## 2017-10-05 ENCOUNTER — ALLIED HEALTH/NURSE VISIT (OUTPATIENT)
Dept: EDUCATION SERVICES | Facility: CLINIC | Age: 37
End: 2017-10-05
Payer: COMMERCIAL

## 2017-10-05 DIAGNOSIS — E11.9 DIABETES MELLITUS (H): Primary | ICD-10-CM

## 2017-10-05 PROCEDURE — G0109 DIAB MANAGE TRN IND/GROUP: HCPCS

## 2017-10-05 NOTE — MR AVS SNAPSHOT
After Visit Summary   10/5/2017    Radha Wilson    MRN: [id number]           Patient Information     Date Of Birth          1980        Visit Information        Provider Department      10/5/2017 4:00 PM FK CLASS ONE [facility]        Today's Diagnoses     Diabetes mellitus (H)    -  1       Follow-ups after your visit        Your next 10 appointments already scheduled     Nov 02, 2017  4:00 PM CDT   Diabetic Education with FK CLASS TWO   [facility] ([facility])    [address] [address] [phone]            Dec 07, 2017  4:00 PM CST   Diabetic Education with FK CLASS THREE   [facility] ([facility])    [address]  [place] [address] [phone]              Who to contact     If you have questions or need follow up information about today's clinic visit or your schedule please contact [facility] directly at [phone].  Normal or non-critical lab and imaging results will be communicated to you by Attendifyhart, letter or phone within 4 business days after the clinic has received the results. If you do not hear from us within 7 days, please contact the clinic through Dotstudiozt or phone. If you have a critical or abnormal lab result, we will notify you by phone as soon as possible.  Submit refill requests through Subarctic Limited or call your pharmacy and they will forward the refill request to us. Please allow 3 business days for your refill to be completed.          Additional Information About Your Visit        Attendifyhart Information     [employer] gives you secure access to your electronic health record. If you see a primary care provider, you can also send messages to your care team and make appointments. If you have questions, please call your primary care clinic.  If you do not have a primary care provider, please call [phone] and they will assist you.        Care EveryWhere  ID     This is your Care EveryWhere ID. This could be used by other organizations to access your [facility] medical records  [id number]         Blood Pressure from Last 3 Encounters:   08/24/17 129/75   01/17/17 134/70   01/04/16 127/81    Weight from Last 3 Encounters:   09/14/17 176 lb (79.8 kg)   08/24/17 177 lb (80.3 kg)   01/17/17 190 lb (86.2 kg)              We Performed the Following     DIABETES EDUCATION - Group []         Primary Care Provider Office Phone # Fax #    Katy Moore [phone] [address]        Equal Access to Services     HARI CORREA : Hadii aad bladimir hadasho Somartín, waaxda luqadaha, qaybta kaalmada adeegyada, anastasia dallas . So [facility] [phone].    ATENCIÓN: Si habla español, tiene a brewster disposición servicios gratuitos de asistencia lingüística. [facility] [phone].    We comply with applicable federal civil rights laws and [place] laws. We do not discriminate on the basis of race, color, national origin, age, disability, sex, sexual orientation, or gender identity.            Thank you!     Thank you for choosing [facility]  for your care. Our goal is always to provide you with excellent care. Hearing back from our patients is one way we can continue to improve our services. Please take a few minutes to complete the written survey that you may receive in the mail after your visit with us. Thank you!             Your Updated Medication List - Protect others around you: Learn how to safely use, store and throw away your medicines at www.disposemymeds.org.          This list is accurate as of: 10/5/17 11:59 PM.  Always use your most recent med list.                   Brand Name Dispense Instructions for use Diagnosis    ACE/ARB NOT PRESCRIBED (INTENTIONAL)      Please choose reason not prescribed, below    Controlled type 2 diabetes mellitus without complication, with long-term  current use of insulin (H)       alcohol swab prep pads     100 each    Use to swab area of injection/mike as directed.    Controlled type 2 diabetes mellitus without complication, with long-term current use of insulin (H)       blood glucose calibration solution    NO BRAND SPECIFIED    1 Bottle    To accompany: Blood Glucose Monitor Brands: per insurance.    Controlled type 2 diabetes mellitus without complication, with long-term current use of insulin (H)       blood glucose monitoring meter device kit    no brand specified    1 kit    Use to test blood sugar 2 times daily or as directed. Preferred blood glucose meter OR supplies to accompany: Blood Glucose Monitor Brands: per insurance.    Controlled type 2 diabetes mellitus without complication, with long-term current use of insulin (H)       blood glucose monitoring test strip    no brand specified    100 strip    Use to test blood sugar 2 times daily or as directed. To accompany: Blood Glucose Monitor Brands: per insurance.    Controlled type 2 diabetes mellitus without complication, with long-term current use of insulin (H)       levonorgestrel-ethinyl estradiol 0.15-0.03 MG per tablet    QUASENSE    112 tablet    Take 1 tablet by mouth daily Pt on continuous ocp, skip week 4 of packs 1-3, take week 4 of pack 4    Encounter for surveillance of contraceptive pills       PARoxetine 20 MG tablet    PAXIL    225 tablet    TAKE 2& 1/2(50MG) TABLET BY MOUTH AT BEDTIME    Anxiety, Major depressive disorder, recurrent episode, mild (H)       STATIN NOT PRESCRIBED (INTENTIONAL)      Please choose reason not prescribed, below    Controlled type 2 diabetes mellitus without complication, with long-term current use of insulin (H)       thin lancets    NO BRAND SPECIFIED    200 each    Use with lanceting device. To accompany: Blood Glucose Monitor Brands: per insurance.    Controlled type 2 diabetes mellitus without complication, with long-term current use of insulin (H)

## 2017-11-02 ENCOUNTER — ALLIED HEALTH/NURSE VISIT (OUTPATIENT)
Dept: EDUCATION SERVICES | Facility: CLINIC | Age: 37
End: 2017-11-02
Payer: COMMERCIAL

## 2017-11-02 DIAGNOSIS — E11.9 DIABETES MELLITUS (H): Primary | ICD-10-CM

## 2017-11-02 PROCEDURE — G0109 DIAB MANAGE TRN IND/GROUP: HCPCS

## 2017-11-02 NOTE — MR AVS SNAPSHOT
After Visit Summary   11/2/2017    Radha Wilson    MRN: [id number]           Patient Information     Date Of Birth          1980        Visit Information        Provider Department      11/2/2017 4:00 PM FK CLASS TWO [facility]        Today's Diagnoses     Diabetes mellitus (H)    -  1       Follow-ups after your visit        Your next 10 appointments already scheduled     Dec 07, 2017  4:00 PM CST   Diabetic Education with FK CLASS THREE   [facility]    [address]  [place] [address] [phone]              Who to contact     If you have questions or need follow up information about today's clinic visit or your schedule please contact [facility] directly at [phone].  Normal or non-critical lab and imaging results will be communicated to you by MyChart, letter or phone within 4 business days after the clinic has received the results. If you do not hear from us within 7 days, please contact the clinic through MyChart or phone. If you have a critical or abnormal lab result, we will notify you by phone as soon as possible.  Submit refill requests through Welliko or call your pharmacy and they will forward the refill request to us. Please allow 3 business days for your refill to be completed.          Additional Information About Your Visit        MyChart Information     Welliko gives you secure access to your electronic health record. If you see a primary care provider, you can also send messages to your care team and make appointments. If you have questions, please call your primary care clinic.  If you do not have a primary care provider, please call [phone] and they will assist you.        Care EveryWhere ID     This is your Care EveryWhere ID. This could be used by other organizations to access your [facility] medical records  [id number]         Blood Pressure from Last 3 Encounters:   08/24/17  129/75   01/17/17 134/70   01/04/16 127/81    Weight from Last 3 Encounters:   09/14/17 79.8 kg (176 lb)   08/24/17 80.3 kg (177 lb)   01/17/17 86.2 kg (190 lb)              We Performed the Following     DIABETES EDUCATION - Group []         Primary Care Provider Office Phone # Fax #    Katy Fiona Moore [phone] [id number] [address] [facility] [address]        Equal Access to Services     [facility] : Hadii aad ku hadasho Soomaali, waaxda luqadaha, qaybta kaalmada adeegyada, waxay idiin hayaan adeeg hugo dallas . So [facility] [phone].    ATENCIÓN: Si habla español, tiene a brewster disposición servicios gratuitos de asistencia lingüística. Ll[facility] [phone].    We comply with applicable federal civil rights laws and [place] laws. We do not discriminate on the basis of race, color, national origin, age, disability, sex, sexual orientation, or gender identity.            Thank you!     [facility]  [facility]. Our goal is always to provide you with excellent care. Hearing back from our patients is one way we can continue to improve our services. Please take a few minutes to complete the written survey that you may receive in the mail after your visit with us. Thank you!             Your Updated Medication List - Protect others around you: Learn how to safely use, store and throw away your medicines at www.disposemymeds.org.          This list is accurate as of: 11/2/17 11:59 PM.  Always use your most recent med list.                   Brand Name Dispense Instructions for use Diagnosis    ACE/ARB/ARNI NOT PRESCRIBED (INTENTIONAL)      Please choose reason not prescribed, below    Controlled type 2 diabetes mellitus without complication, with long-term current use of insulin (H)       alcohol swab prep pads     100 each    Use to swab area of injection/mike as directed.    Controlled type 2 diabetes mellitus without complication, with  long-term current use of insulin (H)       blood glucose calibration solution    NO BRAND SPECIFIED    1 Bottle    To accompany: Blood Glucose Monitor Brands: per insurance.    Controlled type 2 diabetes mellitus without complication, with long-term current use of insulin (H)       blood glucose monitoring meter device kit    no brand specified    1 kit    Use to test blood sugar 2 times daily or as directed. Preferred blood glucose meter OR supplies to accompany: Blood Glucose Monitor Brands: per insurance.    Controlled type 2 diabetes mellitus without complication, with long-term current use of insulin (H)       blood glucose monitoring test strip    no brand specified    100 strip    Use to test blood sugar 2 times daily or as directed. To accompany: Blood Glucose Monitor Brands: per insurance.    Controlled type 2 diabetes mellitus without complication, with long-term current use of insulin (H)       levonorgestrel-ethinyl estradiol 0.15-0.03 MG per tablet    QUASENSE    112 tablet    Take 1 tablet by mouth daily Pt on continuous ocp, skip week 4 of packs 1-3, take week 4 of pack 4    Encounter for surveillance of contraceptive pills       PARoxetine 20 MG tablet    PAXIL    225 tablet    TAKE 2& 1/2(50MG) TABLET BY MOUTH AT BEDTIME    Anxiety, Major depressive disorder, recurrent episode, mild (H)       STATIN NOT PRESCRIBED (INTENTIONAL)      Please choose reason not prescribed, below    Controlled type 2 diabetes mellitus without complication, with long-term current use of insulin (H)       thin lancets    NO BRAND SPECIFIED    200 each    Use with lanceting device. To accompany: Blood Glucose Monitor Brands: per insurance.    Controlled type 2 diabetes mellitus without complication, with long-term current use of insulin (H)

## 2017-11-03 NOTE — PROGRESS NOTES
Diabetes Self-Management Training Class 2    Radha Wilson presents today for group education related to Type 2 diabetes   She is accompanied by self    Educational Topics Discussed  Diabetes Treatment Options, A1C Value, Hyperglycemia & Hypoglycemia Prevention and Symptoms, Why BG rise and fall, Exercise Guidelines, Effect of Stress on BG and Stress Management, Carbohydrate Counting Review, Alcohol, Healthy Eating Guidelines, Heart Healthy Eating (Fats, Fiber)  Pre diabetes    Materials Provided  No new materials tonight    All questions were answered in the group setting. Patient to contact educator with specific questions, should need arise.    Plan and Follow-up  Patient to continue working on progress toward meeting goals set at Class 1.   Patient to attend Diabetes Self-Management Training Class 3.  Patient to follow-up with educator regarding blood glucose values as determined at Diabetes Self-Management Training Initial Assessment Visit.     Patient-stated goal written and given to patient.     Harriet Naranjo RN  BSN CDE    Time Spent: 120 minutes

## 2017-11-16 DIAGNOSIS — E11.9 CONTROLLED TYPE 2 DIABETES MELLITUS WITHOUT COMPLICATION, WITH LONG-TERM CURRENT USE OF INSULIN (H): ICD-10-CM

## 2017-11-16 DIAGNOSIS — Z79.4 CONTROLLED TYPE 2 DIABETES MELLITUS WITHOUT COMPLICATION, WITH LONG-TERM CURRENT USE OF INSULIN (H): ICD-10-CM

## 2017-11-16 LAB — HBA1C MFR BLD: 6.8 % (ref 4.3–6)

## 2017-11-16 PROCEDURE — 36415 COLL VENOUS BLD VENIPUNCTURE: CPT | Performed by: FAMILY MEDICINE

## 2017-11-16 PROCEDURE — 83036 HEMOGLOBIN GLYCOSYLATED A1C: CPT | Performed by: FAMILY MEDICINE

## 2017-12-07 ENCOUNTER — ALLIED HEALTH/NURSE VISIT (OUTPATIENT)
Dept: EDUCATION SERVICES | Facility: CLINIC | Age: 37
End: 2017-12-07
Payer: COMMERCIAL

## 2017-12-07 DIAGNOSIS — E11.9 DIABETES MELLITUS (H): Primary | ICD-10-CM

## 2017-12-07 PROCEDURE — G0109 DIAB MANAGE TRN IND/GROUP: HCPCS

## 2017-12-07 NOTE — MR AVS SNAPSHOT
After Visit Summary   12/7/2017    Radha Wilson    MRN: [id number]           Patient Information     Date Of Birth          1980        Visit Information        Provider Department      12/7/2017 4:00 PM FK CLASS THREE [facility]jel        Today's Diagnoses     Diabetes mellitus (H)    -  1       Follow-ups after your visit        Who to contact     If you have questions or need follow up information about today's clinic visit or your schedule please contact [facility] directly at [phone].  Normal or non-critical lab and imaging results will be communicated to you by [employer], letter or phone within 4 business days after the clinic has received the results. If you do not hear from us within 7 days, please contact the clinic through [employer] or phone. If you have a critical or abnormal lab result, we will notify you by phone as soon as possible.  Submit refill requests through [employer] or call your pharmacy and they will forward the refill request to us. Please allow 3 business days for your refill to be completed.          Additional Information About Your Visit        MyChart Information     [employer] gives you secure access to your electronic health record. If you see a primary care provider, you can also send messages to your care team and make appointments. If you have questions, please call your primary care clinic.  If you do not have a primary care provider, please call [phone] and they will assist you.        Care EveryWhere ID     This is your Care EveryWhere ID. This could be used by other organizations to access your [place] medical records  [id number]         Blood Pressure from Last 3 Encounters:   08/24/17 129/75   01/17/17 134/70   01/04/16 127/81    Weight from Last 3 Encounters:   09/14/17 79.8 kg (176 lb)   08/24/17 80.3 kg (177 lb)   01/17/17 86.2 kg (190 lb)              We Performed the Following     DIABETES EDUCATION - Group []          Primary Care Provider Office Phone # Fax #    Katy Moore [phone] [address] [facility] [address]        Equal Access to Services     HARI CORREA : Sherly garrison mely Azar, waaxda luqadaha, qaybta kaalmada elly, anastasia arias burt levi. So [facility] [phone].    ATENCIÓN: Si habla español, tiene a brewster disposición servicios gratuitos de asistencia lingüística. Llame al [phone].    We comply with applicable federal civil rights laws and [place] laws. We do not discriminate on the basis of race, color, national origin, age, disability, sex, sexual orientation, or gender identity.            Thank you!     [facility] you [facility]  [facility] your [facility]. Our goal is always to provide you with excellent care. Hearing back from our patients is one way we can continue to improve our services. Please take a few minutes to complete the written survey that you may receive in the mail after your visit with us. Thank you!             Your Updated Medication List - Protect others around you: Learn how to safely use, store and throw away your medicines at www.disposemymeds.org.          This list is accurate as of: 12/7/17 11:59 PM.  Always use your most recent med list.                   Brand Name Dispense Instructions for use Diagnosis    ACE/ARB/ARNI NOT PRESCRIBED (INTENTIONAL)      Please choose reason not prescribed, below    Controlled type 2 diabetes mellitus without complication, with long-term current use of insulin (H)       alcohol swab prep pads     100 each    Use to swab area of injection/mike as directed.    Controlled type 2 diabetes mellitus without complication, with long-term current use of insulin (H)       blood glucose calibration solution    NO BRAND SPECIFIED    1 Bottle    To accompany: Blood Glucose Monitor Brands: per insurance.    Controlled type 2 diabetes mellitus without complication, with long-term  current use of insulin (H)       blood glucose monitoring meter device kit    no brand specified    1 kit    Use to test blood sugar 2 times daily or as directed. Preferred blood glucose meter OR supplies to accompany: Blood Glucose Monitor Brands: per insurance.    Controlled type 2 diabetes mellitus without complication, with long-term current use of insulin (H)       blood glucose monitoring test strip    no brand specified    100 strip    Use to test blood sugar 2 times daily or as directed. To accompany: Blood Glucose Monitor Brands: per insurance.    Controlled type 2 diabetes mellitus without complication, with long-term current use of insulin (H)       levonorgestrel-ethinyl estradiol 0.15-0.03 MG per tablet    QUASENSE    112 tablet    Take 1 tablet by mouth daily Pt on continuous ocp, skip week 4 of packs 1-3, take week 4 of pack 4    Encounter for surveillance of contraceptive pills       PARoxetine 20 MG tablet    PAXIL    225 tablet    TAKE 2& 1/2(50MG) TABLET BY MOUTH AT BEDTIME    Anxiety, Major depressive disorder, recurrent episode, mild (H)       STATIN NOT PRESCRIBED (INTENTIONAL)      Please choose reason not prescribed, below    Controlled type 2 diabetes mellitus without complication, with long-term current use of insulin (H)       thin lancets    NO BRAND SPECIFIED    200 each    Use with lanceting device. To accompany: Blood Glucose Monitor Brands: per insurance.    Controlled type 2 diabetes mellitus without complication, with long-term current use of insulin (H)

## 2017-12-08 NOTE — PROGRESS NOTES
Diabetes Self-Management Training Class 3    Radha Wilson presents today for group education related to Type 2 diabetes   She is accompanied by self    Educational Topics Discussed  Diabetes Standards of Care (D5), Prevention of Diabetes Complications, Staying Safe with Diabetes (Medical ID, Safe Travel, Sick Days), Eating Out, Diabetes and Depression, Support from Family and Friends, Resources for Support and Further Education, foot care, review of carb counting    Materials Provided  No new materials provided    All questions were answered in the group setting. Patient to contact educator with specific questions, should need arise.    Plan and Follow-up  Patient to follow-up with educator regarding blood glucose values as determined at Diabetes Self-Management Training Initial Assessment Visit.   Patient to follow-up with PCP and have A1C lab redrawn as directed.    Patient has a goal to lose 40 lbs by age 38 .  States that she is losing weight.  Patient pleased with lower A1C.    Patient-stated goal written and given to patient.     Harriet Naranjo RN  BSN CDE    Time Spent: 120 minutes

## 2017-12-10 ENCOUNTER — MYC MEDICAL ADVICE (OUTPATIENT)
Dept: FAMILY MEDICINE | Facility: CLINIC | Age: 37
End: 2017-12-10

## 2017-12-10 ENCOUNTER — E-VISIT (OUTPATIENT)
Dept: FAMILY MEDICINE | Facility: CLINIC | Age: 37
End: 2017-12-10

## 2017-12-10 DIAGNOSIS — R22.30 MASS OF HAND, UNSPECIFIED LATERALITY: Primary | ICD-10-CM

## 2017-12-14 ENCOUNTER — OFFICE VISIT (OUTPATIENT)
Dept: ORTHOPEDICS | Facility: CLINIC | Age: 37
End: 2017-12-14
Payer: COMMERCIAL

## 2017-12-14 VITALS — RESPIRATION RATE: 14 BRPM | HEIGHT: 63 IN | BODY MASS INDEX: 31.89 KG/M2 | WEIGHT: 180 LBS

## 2017-12-14 DIAGNOSIS — R22.31 MASS OF RIGHT HAND: Primary | ICD-10-CM

## 2017-12-14 PROCEDURE — 99203 OFFICE O/P NEW LOW 30 MIN: CPT | Mod: 25 | Performed by: ORTHOPAEDIC SURGERY

## 2017-12-14 PROCEDURE — 20612 ASPIRATE/INJ GANGLION CYST: CPT | Performed by: ORTHOPAEDIC SURGERY

## 2017-12-14 NOTE — NURSING NOTE
"Chief Complaint   Patient presents with     Consult     Right hand cyst. Pt states cyst has been there for about a month. Painful when she applies pressure to the where since it is on the inside of hand.        Initial Resp 14  Ht 1.6 m (5' 3\")  Wt 81.6 kg (180 lb)  BMI 31.89 kg/m2 Estimated body mass index is 31.89 kg/(m^2) as calculated from the following:    Height as of this encounter: 1.6 m (5' 3\").    Weight as of this encounter: 81.6 kg (180 lb).  Medication Reconciliation: complete  Priya Luke, KALIN 12/14/2017 3:12 PM    "

## 2017-12-14 NOTE — NURSING NOTE
"A right palmar cyst aspiration was performed on 12/14/2017 at 3:44 PM. Risks,benefits and complications of the injection were discussed with the patient and the patient has elected to proceed after verbal consent. Using sterile technique, the area was prepped with betadine . A 18 Gauge 1.5\", 25 Gauge 1.5\" was used to inject the medication(s) listed below.This was well tolerated. No apparent complications. . Did also discuss that if diabetic, recommend close monitoring of blood sugars over the next week as cortisone injections can temporarily elevate blood sugar.    The following medication(s) was given:     MEDICATION: Lidocaine HCL  1% per mL  ROUTE: local  SITE:Right Hand   : Hospira (Marcaine,Lidoncaine)  DOSE: 1 cc  LOT #: [id number]  EXPIRATION DATE:  1 feb2009    Fran CACERES    "

## 2017-12-14 NOTE — MR AVS SNAPSHOT
"              After Visit Summary   12/14/2017    Radha Wilson    MRN: [id number]           Patient Information     Date Of Birth          1980        Visit Information        Provider Department      12/14/2017 3:15 PM Jerome Tobin MD [facility]        Today's Diagnoses     Mass of right hand    -  1       Follow-ups after your visit        Who to contact     If you have questions or need follow up information about today's clinic visit or your schedule please contact [facility] directly at [phone].  Normal or non-critical lab and imaging results will be communicated to you by [employer], letter or phone within 4 business days after the clinic has received the results. If you do not hear from us within 7 days, please contact the clinic through [employer] or phone. If you have a critical or abnormal lab result, we will notify you by phone as soon as possible.  Submit refill requests through [employer] or call your pharmacy and they will forward the refill request to us. Please allow 3 business days for your refill to be completed.          Additional Information About Your Visit        MyChart Information     [employer] gives you secure access to your electronic health record. If you see a primary care provider, you can also send messages to your care team and make appointments. If you have questions, please call your primary care clinic.  If you do not have a primary care provider, please call [phone] and they will assist you.        Care EveryWhere ID     This is your Care EveryWhere ID. This could be used by other organizations to access your [place] medical records  [id number]        Your Vitals Were     Respirations Height BMI (Body Mass Index)             14 1.6 m (5' 3\") 31.89 kg/m2          Blood Pressure from Last 3 Encounters:   08/24/17 129/75   01/17/17 134/70   01/04/16 127/81    Weight from Last 3 Encounters:   12/14/17 81.6 kg (180 lb)   09/14/17 79.8 kg " (176 lb)   08/24/17 80.3 kg (177 lb)              We Performed the Following     ASPIRATION &/OR INJECTION GANGLION CYST, ANY        Primary Care Provider Office Phone # Fax #    Katy Moore [phone] [address] [facility] [address]        Equal Access to Services     KASSY CORREA : Hadii aad ku hadasho Soomaali, waaxda luqadaha, qaybta kaalmada adeegyada, waxay idiin hayaan adeavery rubioyanna labassam . So [facility] [phone].    ATENCIÓN: Si habla español, tiene a brewster disposición servicios gratuitos de asistencia lingüística. Ericka al [phone].    We comply with applicable federal civil rights laws and [place] laws. We do not discriminate on the basis of race, color, national origin, age, disability, sex, sexual orientation, or gender identity.            Thank you!     Thank you for choosing [facility]  for your care. Our goal is always to provide you with excellent care. Hearing back from our patients is one way we can continue to improve our services. Please take a few minutes to complete the written survey that you may receive in the mail after your visit with us. Thank you!             Your Updated Medication List - Protect others around you: Learn how to safely use, store and throw away your medicines at www.disposemymeds.org.          This list is accurate as of: 12/14/17 11:59 PM.  Always use your most recent med list.                   Brand Name Dispense Instructions for use Diagnosis    ACE/ARB/ARNI NOT PRESCRIBED (INTENTIONAL)      Please choose reason not prescribed, below    Controlled type 2 diabetes mellitus without complication, with long-term current use of insulin (H)       alcohol swab prep pads     100 each    Use to swab area of injection/mike as directed.    Controlled type 2 diabetes mellitus without complication, with long-term current use of insulin (H)       blood glucose calibration solution    NO BRAND SPECIFIED    1 Bottle    To  accompany: Blood Glucose Monitor Brands: per insurance.    Controlled type 2 diabetes mellitus without complication, with long-term current use of insulin (H)       blood glucose monitoring meter device kit    no brand specified    1 kit    Use to test blood sugar 2 times daily or as directed. Preferred blood glucose meter OR supplies to accompany: Blood Glucose Monitor Brands: per insurance.    Controlled type 2 diabetes mellitus without complication, with long-term current use of insulin (H)       blood glucose monitoring test strip    no brand specified    100 strip    Use to test blood sugar 2 times daily or as directed. To accompany: Blood Glucose Monitor Brands: per insurance.    Controlled type 2 diabetes mellitus without complication, with long-term current use of insulin (H)       levonorgestrel-ethinyl estradiol 0.15-0.03 MG per tablet    QUASENSE    112 tablet    Take 1 tablet by mouth daily Pt on continuous ocp, skip week 4 of packs 1-3, take week 4 of pack 4    Encounter for surveillance of contraceptive pills       PARoxetine 20 MG tablet    PAXIL    225 tablet    TAKE 2& 1/2(50MG) TABLET BY MOUTH AT BEDTIME    Anxiety, Major depressive disorder, recurrent episode, mild (H)       STATIN NOT PRESCRIBED (INTENTIONAL)      Please choose reason not prescribed, below    Controlled type 2 diabetes mellitus without complication, with long-term current use of insulin (H)       thin lancets    NO BRAND SPECIFIED    200 each    Use with lanceting device. To accompany: Blood Glucose Monitor Brands: per insurance.    Controlled type 2 diabetes mellitus without complication, with long-term current use of insulin (H)

## 2017-12-14 NOTE — LETTER
"    12/14/2017         RE: Radha Wilson  [address] [facility] [address]        Dear Colleague,    Thank you for referring your patient, Radha Wilson, to the [facility]. Please see a copy of my visit note below.    SUBJECTIVE:  Radha Wilson is a 37 year old female who is seen as self referral for right hand/finger mass which she noticed 3-4 weeks ago. The patient reports she had a ganglion cyst in her right wrist    Present symptoms Pain with pressure.     Patients past medical, surgical, social and family histories reviewed.     REVIEW OF SYSTEMS:  CONSTITUTIONAL:  NEGATIVE for fever, chills, change in weight  INTEGUMENTARY/SKIN:  NEGATIVE for worrisome rashes, moles or lesions  EYES:  NEGATIVE for vision changes or irritation  ENT/MOUTH:  NEGATIVE for ear, mouth and throat problems  RESP:  NEGATIVE for significant cough or SOB  BREAST:  NEGATIVE for masses, tenderness or discharge  CV:  NEGATIVE for chest pain, palpitations or peripheral edema  GI:  NEGATIVE for nausea, abdominal pain, heartburn, or change in bowel habits  :  Negative   MUSCULOSKELETAL:  See HPI above  NEURO:  NEGATIVE for weakness, dizziness or paresthesias  ENDOCRINE:  NEGATIVE for temperature intolerance, skin/hair changes  HEME/ALLERGY/IMMUNE:  NEGATIVE for bleeding problems  PSYCHIATRIC:  NEGATIVE for changes in mood or affect    PHYSICAL EXAM:  Resp 14  Ht 1.6 m (5' 3\")  Wt 81.6 kg (180 lb)  BMI 31.89 kg/m2  GENERAL APPEARANCE: healthy, alert and no distress   SKIN: no suspicious lesions or rashes  NEURO: Normal strength and tone, mentation intact and speech normal  VASCULAR: good pulses, and cappillary refill   LYMPH: no lymphadenopathy   PSYCH:  mentation appears normal and affect normal/bright  RESP: no increased work of breathing      MUSCULOSKELETAL:  RIGHT HAND:    Inspection: mass located over the 2nd metacarpal head on the palmar aspect of the hand.  Size: 4 mm  Tender  Consistency: Firm  Mobility " somewhat mobile  Able to make a full fist.    ASSESSMENT:  Probable ganglion cyst associated with the 2nd flexor tendon sheath, right hand.    Plan:  We talked about the treatment options: aspiration and mass excision. Due to previous success, the patient decided to proceed with an aspiration. All questions were answered.    PROCEDURE NOTE:  The risks, benefits and potential complications (including but not limited to, bleeding, infection, pain, scar, damage to adjacent structures, atrophy or necrosis of soft tissue, skin blanching, failure to relieve symptoms) of aspiration were discussed with the patient. Questions were addressed and answered.The patient elected to proceed. Written, informed consent was obtained. The correct procedural site was identified and confirmed. A Right Hand aspiration was performed using local anesthetic after sterile prep, to the correct procedural site. A very small amount of gelatinous fluid extruded from the aspiration site. Sterile bandaid applied. This was tolerated well by the patient. No apparent complications.     Return to clinic: SAVANAH Tobin MD  Dept. Orthopedic Surgery  [facility]    This document serves as a record of the services and decisions personally performed and made by Dr. QASIM Tobin MD. It was created on his behalf by Jacky Roman, a trained medical scribe. The creation of this record is based on the provider's personal observations and the statements of the patient. This document has been checked and approved by the attending provider.   Jacky Roman December 14, 2017 4:15 PM        Again, thank you for allowing me to participate in the care of your patient.        Sincerely,        Jerome Tobin MD

## 2017-12-14 NOTE — PROGRESS NOTES
"SUBJECTIVE:  Radha Wilson is a 37 year old female who is seen as self referral for right hand/finger mass which she noticed 3-4 weeks ago. The patient reports she had a ganglion cyst in her right wrist    Present symptoms Pain with pressure.     Patients past medical, surgical, social and family histories reviewed.     REVIEW OF SYSTEMS:  CONSTITUTIONAL:  NEGATIVE for fever, chills, change in weight  INTEGUMENTARY/SKIN:  NEGATIVE for worrisome rashes, moles or lesions  EYES:  NEGATIVE for vision changes or irritation  ENT/MOUTH:  NEGATIVE for ear, mouth and throat problems  RESP:  NEGATIVE for significant cough or SOB  BREAST:  NEGATIVE for masses, tenderness or discharge  CV:  NEGATIVE for chest pain, palpitations or peripheral edema  GI:  NEGATIVE for nausea, abdominal pain, heartburn, or change in bowel habits  :  Negative   MUSCULOSKELETAL:  See HPI above  NEURO:  NEGATIVE for weakness, dizziness or paresthesias  ENDOCRINE:  NEGATIVE for temperature intolerance, skin/hair changes  HEME/ALLERGY/IMMUNE:  NEGATIVE for bleeding problems  PSYCHIATRIC:  NEGATIVE for changes in mood or affect    PHYSICAL EXAM:  Resp 14  Ht 1.6 m (5' 3\")  Wt 81.6 kg (180 lb)  BMI 31.89 kg/m2  GENERAL APPEARANCE: healthy, alert and no distress   SKIN: no suspicious lesions or rashes  NEURO: Normal strength and tone, mentation intact and speech normal  VASCULAR: good pulses, and cappillary refill   LYMPH: no lymphadenopathy   PSYCH:  mentation appears normal and affect normal/bright  RESP: no increased work of breathing      MUSCULOSKELETAL:  RIGHT HAND:    Inspection: mass located over the 2nd metacarpal head on the palmar aspect of the hand.  Size: 4 mm  Tender  Consistency: Firm  Mobility somewhat mobile  Able to make a full fist.    ASSESSMENT:  Probable ganglion cyst associated with the 2nd flexor tendon sheath, right hand.    Plan:  We talked about the treatment options: aspiration and mass excision. Due to previous " success, the patient decided to proceed with an aspiration. All questions were answered.    PROCEDURE NOTE:  The risks, benefits and potential complications (including but not limited to, bleeding, infection, pain, scar, damage to adjacent structures, atrophy or necrosis of soft tissue, skin blanching, failure to relieve symptoms) of aspiration were discussed with the patient. Questions were addressed and answered.The patient elected to proceed. Written, informed consent was obtained. The correct procedural site was identified and confirmed. A Right Hand aspiration was performed using local anesthetic after sterile prep, to the correct procedural site. A very small amount of gelatinous fluid extruded from the aspiration site. Sterile bandaid applied. This was tolerated well by the patient. No apparent complications.     Return to clinic: SAVANAH Tobin MD  Dept. Orthopedic Surgery  [facility]    This document serves as a record of the services and decisions personally performed and made by Dr. QASIM Tobin MD. It was created on his behalf by Jacky Roman, a trained medical scribe. The creation of this record is based on the provider's personal observations and the statements of the patient. This document has been checked and approved by the attending provider.   Jacky Roman December 14, 2017 4:15 PM

## 2017-12-18 DIAGNOSIS — F33.0 MAJOR DEPRESSIVE DISORDER, RECURRENT EPISODE, MILD (H): ICD-10-CM

## 2017-12-18 DIAGNOSIS — F41.9 ANXIETY: ICD-10-CM

## 2017-12-19 RX ORDER — PAROXETINE 20 MG/1
TABLET, FILM COATED ORAL
Qty: 225 TABLET | Refills: 0 | Status: SHIPPED | OUTPATIENT
Start: 2017-12-19 | End: 2018-02-08

## 2017-12-19 NOTE — TELEPHONE ENCOUNTER
Last PHQ-9 done in Aug was a little elevated so RN mycharted patient.   She states she was stressed about recent diagnosis of diabetes and followed up with PCP 3 weeks later.     Rx refilled per Marbin RN refill protocol.    Olga Ospina RN

## 2018-01-17 DIAGNOSIS — Z30.41 ENCOUNTER FOR SURVEILLANCE OF CONTRACEPTIVE PILLS: ICD-10-CM

## 2018-01-18 NOTE — TELEPHONE ENCOUNTER
Rx refilled per Marbin RN refill protocol.    Pending appointment with PCP next month for physical.    Olga Ospina RN

## 2018-01-25 ENCOUNTER — TRANSFERRED RECORDS (OUTPATIENT)
Dept: HEALTH INFORMATION MANAGEMENT | Facility: CLINIC | Age: 38
End: 2018-01-25

## 2018-01-31 NOTE — PROGRESS NOTES
SUBJECTIVE:   CC: Radha Wilson is an 37 year old woman who presents for preventive health visit.     Healthy Habits:    Do you get at least three servings of calcium containing foods daily (dairy, green leafy vegetables, etc.)? yes    Amount of exercise or daily activities, outside of work: 1 day(s) per week    Problems taking medications regularly No    Medication side effects: No    Have you had an eye exam in the past two years? no    Do you see a dentist twice per year? yes    Do you have sleep apnea, excessive snoring or daytime drowsiness?daytime drowsiness           Today's PHQ-2 Score:   PHQ-2 (  [employer]) 2018   Q1: Little interest or pleasure in doing things 1 -   Q2: Feeling down, depressed or hopeless 0 -   PHQ-2 Score 1 -   Q1: Little interest or pleasure in doing things Several days Several days   Q2: Feeling down, depressed or hopeless Not at all Not at all   PHQ-2 Score 1 1       Abuse: Current or Past(Physical, Sexual or Emotional)- No  Do you feel safe in your environment - Yes    Social History   Substance Use Topics     Smoking status: Never Smoker     Smokeless tobacco: Never Used      Comment: Nonsmoking household     Alcohol use No      Comment: Minimal     If you drink alcohol do you typically have >3 drinks per day or >7 drinks per week? No                     Reviewed orders with patient.  Reviewed health maintenance and updated orders accordingly - Yes         No LMP recorded. Patient is not currently having periods (Reason: Birth Control).     Fasting: No   Td:Tdap        Last 3 Pap and HPV Results:   PAP / HPV 2011   PAP NIL NIL        HPV neg           Cholesterol:   Lab Results   Component Value Date    CHOL  2017     Test canceled after completion  CORRECTED ON  AT 1114: PREVIOUSLY REPORTED  Desirable:       <200   mg/dl CACC      CHOL 238 2017     Lab Results   Component Value Date    HDL  2017      Test canceled after completion  CORRECTED ON 07/24 AT 1113: PREVIOUSLY REPORTED AS 46      HDL 46 07/21/2017     Lab Results   Component Value Date    LDL  07/21/2017     Test canceled after completion  CORRECTED ON 07/24 AT 1113: PREVIOUSLY REPORTED  Above desirable:  100 129   mg/dl Borderline High:  130 159 mg/dL High:             160 189 mg/dL Very   high:       >189 mg/dl       07/21/2017     Lab Results   Component Value Date    TRIG  07/21/2017     Test canceled after completion  CORRECTED ON 07/24 AT 1113: PREVIOUSLY REPORTED  Borderline high:  150 199   mg/dl High:             200 499 mg/dl Very high:       >499 mg/dl      TRIG 319 07/21/2017     Lab Results   Component Value Date    CHOLHDLRATIO 4.2 11/14/2011     Morning glucose running -     MMG: NA  Dexa:  No     Flex/colo: No      Seat Belt: Yes    Sunscreen use: Yes   Calcium Intake: adeq  Health Care Directive: Yes   Sexually Active: Yes     Current contraception: oral contraceptives  History of abnormal Pap smear: Yes: see pmh  Family history of colon/breast/ovarian cancer: No  Regular self breast exam: Yes  History of abnormal mammogram: No       BP Readings from Last 3 Encounters:   02/08/18 130/79   08/24/17 129/75   01/17/17 134/70    Wt Readings from Last 3 Encounters:   02/08/18 173 lb (78.5 kg)   12/14/17 180 lb (81.6 kg)   09/14/17 176 lb (79.8 kg)                  Patient Active Problem List   Diagnosis     CARDIOVASCULAR SCREENING; LDL GOAL LESS THAN 160     Mild major depression (H)     Anxiety     Advanced directives, counseling/discussion     Myopia     Past Surgical History:   Procedure Laterality Date     ABDOMEN SURGERY       APPENDECTOMY  5/19/12    [facility]     EXAM OF VAGINA,COLPOSCOPY  9/20/06    Benign     HC TOOTH EXTRACTION W/FORCEP       TONSILLECTOMY & ADENOIDECTOMY         Social History   Substance Use Topics     Smoking status: Never Smoker     Smokeless tobacco: Never Used      Comment:  Nonsmoking household     Alcohol use No      Comment: Minimal     Family History   Problem Relation Age of Onset     CANCER Mother      lymphoma     Other Cancer Mother      1994-current     Lipids Father      Hyperlipidemia Father      HEART DISEASE Maternal Grandfather      70s     Cancer - colorectal Paternal Grandmother      80s     Glaucoma Paternal Grandmother      Anesthesia Reaction Other      lung problems during appendectomy         Current Outpatient Prescriptions   Medication Sig Dispense Refill     levonorgestrel-ethinyl estradiol (QUASENSE) 0.15-0.03 MG per tablet TAKE 1 TABLET BY MOUTH DAILY AS DIRECTED. TAKE IT CONTINUOUSLY 91 tablet 3     PARoxetine (PAXIL) 20 MG tablet TAKE 2 1/2 TABLETS BY MOUTH EVERY NIGHT AT BEDTIME 225 tablet 1     ACE/ARB NOT PRESCRIBED, INTENTIONAL, Please choose reason not prescribed, below       STATIN NOT PRESCRIBED, INTENTIONAL, Please choose reason not prescribed, below       blood glucose monitoring (NO BRAND SPECIFIED) meter device kit Use to test blood sugar 2 times daily or as directed. Preferred blood glucose meter OR supplies to accompany: Blood Glucose Monitor Brands: per insurance. 1 kit 0     blood glucose monitoring (NO BRAND SPECIFIED) test strip Use to test blood sugar 2 times daily or as directed. To accompany: Blood Glucose Monitor Brands: per insurance. 100 strip 6     blood glucose calibration (NO BRAND SPECIFIED) solution To accompany: Blood Glucose Monitor Brands: per insurance. 1 Bottle 3     thin (NO BRAND SPECIFIED) lancets Use with lanceting device. To accompany: Blood Glucose Monitor Brands: per insurance. 200 each 6     alcohol swab prep pads Use to swab area of injection/mike as directed. 100 each 3     [DISCONTINUED] QUASENSE 0.15-0.03 MG per tablet TAKE 1 TABLET BY MOUTH DAILY AS DIRECTED. TAKE IT CONTINUOUSLY 91 tablet 0     [DISCONTINUED] PARoxetine (PAXIL) 20 MG tablet TAKE 2 1/2 TABLETS BY MOUTH EVERY NIGHT AT BEDTIME 225 tablet 0      [DISCONTINUED] citalopram (CELEXA) 20 MG tablet Take 1 tablet by mouth daily. 90 tablet 1     Recent Labs   Lab Test  02/08/18   1209  11/16/17   1138  08/24/17   1413  07/28/17   1053  07/21/17   1014  09/19/16   0736  11/14/11   0951   A1C  7.1*  6.8*   --   7.1*   --    --    --    LDL   --    --    --    --   128  Test canceled after completion  CORRECTED ON 07/24 AT 1113: PREVIOUSLY REPORTED  Above desirable:  100 129   mg/dl Borderline High:  130 159 mg/dL High:             160 189 mg/dL Very   high:       >189 mg/dl    122*  129   HDL   --    --    --    --   46*  Test canceled after completion  CORRECTED ON 07/24 AT 1113: PREVIOUSLY REPORTED AS 46    46*  52   TRIG   --    --    --    --   319*  Test canceled after completion  CORRECTED ON 07/24 AT 1113: PREVIOUSLY REPORTED  Borderline high:  150 199   mg/dl High:             200 499 mg/dl Very high:       >499 mg/dl    336*  203*   CR   --    --   0.83   --    --    --    --    GFRESTIMATED   --    --   76   --    --    --    --    GFRESTBLACK   --    --   >90   --    --    --    --    POTASSIUM   --    --   4.0   --    --    --    --    TSH   --    --    --    --   2.35   --    --         Mammogram not appropriate for this patient based on age.    Pertinent mammograms are reviewed under the imaging tab.      Reviewed and updated as needed this visit by clinical staff  Tobacco  Allergies  Meds  Problems         Reviewed and updated as needed this visit by Provider  Allergies  Meds  Problems            ROS:  C: NEGATIVE for fever, chills, change in weight  I: NEGATIVE for worrisome rashes, moles or lesions  E: NEGATIVE for vision changes or irritation  ENT: NEGATIVE for ear, mouth and throat problems  R: NEGATIVE for significant cough or SOB  B: NEGATIVE for masses, tenderness or discharge  CV: NEGATIVE for chest pain, palpitations or peripheral edema  GI: NEGATIVE for nausea, abdominal pain, heartburn, or change in bowel habits  :  "NEGATIVE for unusual urinary or vaginal symptoms. Periods are regular.  M: NEGATIVE for significant arthralgias or myalgia  N: NEGATIVE for weakness, dizziness or paresthesias  P: NEGATIVE for changes in mood or affect    OBJECTIVE:   /79  Pulse 88  Temp 97.7  F (36.5  C) (Oral)  Ht 5' 3\" (1.6 m)  Wt 173 lb (78.5 kg)  BMI 30.65 kg/m2  EXAM:  GENERAL: healthy, alert and no distress  EYES: Eyes grossly normal to inspection, PERRL and conjunctivae and sclerae normal  HENT: ear canals and TM's normal, nose and mouth without ulcers or lesions  NECK: no adenopathy, no asymmetry, masses, or scars and thyroid normal to palpation  RESP: lungs clear to auscultation - no rales, rhonchi or wheezes  BREAST: normal without masses, tenderness or nipple discharge and no palpable axillary masses or adenopathy  CV: regular rate and rhythm, normal S1 S2, no S3 or S4, no murmur, click or rub, no peripheral edema and peripheral pulses strong  ABDOMEN: soft, nontender, no hepatosplenomegaly, no masses and bowel sounds normal  MS: no gross musculoskeletal defects noted, no edema  SKIN: no suspicious lesions or rashes  NEURO: Normal strength and tone, mentation intact and speech normal  PSYCH: mentation appears normal, affect normal/bright    ASSESSMENT/PLAN:   (Z00.00) Routine general medical examination at a health care facility  (primary encounter diagnosis)  Comment: preventive needs reviewed  Plan: see orders in Epic.     (E11.9,  Z79.4) Controlled type 2 diabetes mellitus without complication, with long-term current use of insulin (H)  Comment: increased a1c   Plan: Hemoglobin A1c        Pt starting to lose wt again, recheck in 3 months if still above 7 consider metformin    (F33.0) Major depressive disorder, recurrent episode, mild (H)  (F41.9) Anxiety  Comment: stable  Plan: PARoxetine (PAXIL) 20 MG tablet         Refill x 6 months     (Z30.41) Encounter for surveillance of contraceptive pills  Comment: stable  Plan: " "levonorgestrel-ethinyl estradiol (QUASENSE)         0.15-0.03 MG per tablet        Refill x 1 yr     (Z23) Need for prophylactic vaccination and inoculation against influenza  Comment: due  Plan: FLU VAC, SPLIT VIRUS IM > 3 YO (QUADRIVALENT)         [24333], Vaccine Administration, Initial         [01847]              COUNSELING:   Reviewed preventive health counseling, as reflected in patient instructions  Special attention given to:        Regular exercise       Healthy diet/nutrition    BP Screening:   Last 3 BP Readings:    BP Readings from Last 3 Encounters:   02/08/18 130/79   08/24/17 129/75   01/17/17 134/70       The following was recommended to the patient:  Re-screen BP within a year and recommended lifestyle modifications     reports that she has never smoked. She has never used smokeless tobacco.    Estimated body mass index is 30.65 kg/(m^2) as calculated from the following:    Height as of this encounter: 5' 3\" (1.6 m).    Weight as of this encounter: 173 lb (78.5 kg).   Weight management plan: Discussed healthy diet and exercise guidelines and patient will follow up in 6 months in clinic to re-evaluate.    Counseling Resources:  ATP IV Guidelines  Pooled Cohorts Equation Calculator  Breast Cancer Risk Calculator  FRAX Risk Assessment  ICSI Preventive Guidelines  Dietary Guidelines for Americans, 2010  USDA's MyPlate  ASA Prophylaxis  Lung CA Screening    Katy Moore MD  [facility]    Injectable Influenza Immunization Documentation    1.  Is the person to be vaccinated sick today?   No    2. Does the person to be vaccinated have an allergy to a component   of the vaccine?   No  Egg Allergy Algorithm Link    3. Has the person to be vaccinated ever had a serious reaction   to influenza vaccine in the past?   No    4. Has the person to be vaccinated ever had Guillain-Barré syndrome?   No    Form completed by Negrita Webster CMA           "

## 2018-02-08 ENCOUNTER — OFFICE VISIT (OUTPATIENT)
Dept: FAMILY MEDICINE | Facility: CLINIC | Age: 38
End: 2018-02-08
Payer: COMMERCIAL

## 2018-02-08 VITALS
HEART RATE: 88 BPM | DIASTOLIC BLOOD PRESSURE: 79 MMHG | SYSTOLIC BLOOD PRESSURE: 130 MMHG | BODY MASS INDEX: 30.65 KG/M2 | TEMPERATURE: 97.7 F | HEIGHT: 63 IN | WEIGHT: 173 LBS

## 2018-02-08 DIAGNOSIS — Z00.00 ROUTINE GENERAL MEDICAL EXAMINATION AT A HEALTH CARE FACILITY: Primary | ICD-10-CM

## 2018-02-08 DIAGNOSIS — Z30.41 ENCOUNTER FOR SURVEILLANCE OF CONTRACEPTIVE PILLS: ICD-10-CM

## 2018-02-08 DIAGNOSIS — Z79.4 CONTROLLED TYPE 2 DIABETES MELLITUS WITHOUT COMPLICATION, WITH LONG-TERM CURRENT USE OF INSULIN (H): ICD-10-CM

## 2018-02-08 DIAGNOSIS — F33.0 MAJOR DEPRESSIVE DISORDER, RECURRENT EPISODE, MILD (H): ICD-10-CM

## 2018-02-08 DIAGNOSIS — Z23 NEED FOR PROPHYLACTIC VACCINATION AND INOCULATION AGAINST INFLUENZA: ICD-10-CM

## 2018-02-08 DIAGNOSIS — E11.9 CONTROLLED TYPE 2 DIABETES MELLITUS WITHOUT COMPLICATION, WITH LONG-TERM CURRENT USE OF INSULIN (H): ICD-10-CM

## 2018-02-08 DIAGNOSIS — F41.9 ANXIETY: ICD-10-CM

## 2018-02-08 LAB — HBA1C MFR BLD: 7.1 % (ref 4.3–6)

## 2018-02-08 PROCEDURE — 90471 IMMUNIZATION ADMIN: CPT | Performed by: FAMILY MEDICINE

## 2018-02-08 PROCEDURE — 90686 IIV4 VACC NO PRSV 0.5 ML IM: CPT | Performed by: FAMILY MEDICINE

## 2018-02-08 PROCEDURE — 99395 PREV VISIT EST AGE 18-39: CPT | Mod: 25 | Performed by: FAMILY MEDICINE

## 2018-02-08 PROCEDURE — 99214 OFFICE O/P EST MOD 30 MIN: CPT | Mod: 25 | Performed by: FAMILY MEDICINE

## 2018-02-08 PROCEDURE — 83036 HEMOGLOBIN GLYCOSYLATED A1C: CPT | Performed by: FAMILY MEDICINE

## 2018-02-08 PROCEDURE — 36415 COLL VENOUS BLD VENIPUNCTURE: CPT | Performed by: FAMILY MEDICINE

## 2018-02-08 RX ORDER — PAROXETINE 20 MG/1
TABLET, FILM COATED ORAL
Qty: 225 TABLET | Refills: 1 | Status: SHIPPED | OUTPATIENT
Start: 2018-02-08 | End: 2018-09-20

## 2018-02-08 RX ORDER — LEVONORGESTREL AND ETHINYL ESTRADIOL 0.15-0.03
KIT ORAL
Qty: 91 TABLET | Refills: 3 | Status: SHIPPED | OUTPATIENT
Start: 2018-02-08 | End: 2019-02-12

## 2018-02-08 NOTE — MR AVS SNAPSHOT
After Visit Summary   2/8/2018    Radha Wilson    MRN: [id number]           Patient Information     Date Of Birth          1980        Visit Information        Provider Department      2/8/2018 11:55 AM Katy Moore MD [facility]        Today's Diagnoses     Routine general medical examination at a health care facility    -  1    Need for prophylactic vaccination and inoculation against influenza        Encounter for surveillance of contraceptive pills        Anxiety        Major depressive disorder, recurrent episode, mild (H)          Care Instructions      Preventive Health Recommendations  Female Ages 26 - 39  Yearly exam:   See your health care provider every year in order to    Review health changes.     Discuss preventive care.      Review your medicines if you your doctor has prescribed any.    Until age 30: Get a Pap test every three years (more often if you have had an abnormal result).    After age 30: Talk to your doctor about whether you should have a Pap test every 3 years or have a Pap test with HPV screening every 5 years.   You do not need a Pap test if your uterus was removed (hysterectomy) and you have not had cancer.  You should be tested each year for STDs (sexually transmitted diseases), if you're at risk.   Talk to your provider about how often to have your cholesterol checked.  If you are at risk for diabetes, you should have a diabetes test (fasting glucose).  Shots: Get a flu shot each year. Get a tetanus shot every 10 years.   Nutrition:     Eat at least 5 servings of fruits and vegetables each day.    Eat whole-grain bread, whole-wheat pasta and brown rice instead of white grains and rice.    Talk to your provider about Calcium and Vitamin D.     Lifestyle    Exercise at least 150 minutes a week (30 minutes a day, 5 days of the week). This will help you control your weight and prevent disease.    Limit alcohol to one drink per day.    No  "smoking.     Wear sunscreen to prevent skin cancer.    See your dentist every six months for an exam and cleaning.            Follow-ups after your visit        Who to contact     If you have questions or need follow up information about today's clinic visit or your schedule please contact [facility] directly at [phone].  Normal or non-critical lab and imaging results will be communicated to you by MyChart, letter or phone within 4 business days after the clinic has received the results. If you do not hear from us within 7 days, please contact the clinic through [facility] or phone. If you have a critical or abnormal lab result, we will notify you by phone as soon as possible.  Submit refill requests through [employer] or call your pharmacy and they will forward the refill request to us. Please allow 3 business days for your refill to be completed.          Additional Information About Your Visit        MyChart Information     [employer] gives you secure access to your electronic health record. If you see a primary care provider, you can also send messages to your care team and make appointments. If you have questions, please call your primary care clinic.  If you do not have a primary care provider, please call [phone] and they will assist you.        Care EveryWhere ID     This is your Care EveryWhere ID. This could be used by other organizations to access your [facility] medical records  [id number]        Your Vitals Were     Pulse Temperature Height BMI (Body Mass Index)          88 97.7  F (36.5  C) (Oral) 5' 3\" (1.6 m) 30.65 kg/m2         Blood Pressure from Last 3 Encounters:   02/08/18 130/79   08/24/17 129/75   01/17/17 134/70    Weight from Last 3 Encounters:   02/08/18 173 lb (78.5 kg)   12/14/17 180 lb (81.6 kg)   09/14/17 176 lb (79.8 kg)              We Performed the Following     FLU VAC, SPLIT VIRUS IM > 3 YO (QUADRIVALENT) [27546]     Hemoglobin A1c     Vaccine Administration, Initial " [67844]          Today's Medication Changes          These changes are accurate as of 2/8/18 12:20 PM.  If you have any questions, ask your nurse or doctor.               These medicines have changed or have updated prescriptions.        Dose/Directions    levonorgestrel-ethinyl estradiol 0.15-0.03 MG per tablet   Commonly known as:  QUASENSE   This may have changed:  See the new instructions.   Used for:  Encounter for surveillance of contraceptive pills   Changed by:  Katy Moore MD        TAKE 1 TABLET BY MOUTH DAILY AS DIRECTED. TAKE IT CONTINUOUSLY   Quantity:  91 tablet   Refills:  3       PARoxetine 20 MG tablet   Commonly known as:  PAXIL   This may have changed:  See the new instructions.   Used for:  Anxiety, Major depressive disorder, recurrent episode, mild (H)   Changed by:  Katy Moore MD        TAKE 2 1/2 TABLETS BY MOUTH EVERY NIGHT AT BEDTIME   Quantity:  225 tablet   Refills:  1            Where to get your medicines      These medications were sent to [facility] [address] [place] [address] [address] [place] [address]  [facility], [facility] [address]     Phone:  [phone]     levonorgestrel-ethinyl estradiol 0.15-0.03 MG per tablet    PARoxetine 20 MG tablet                Primary Care Provider Office Phone # Fax #    Katy Moore [phone] [address] [address] [facility] [address]        Equal Access to Services     [facility] Hospital[facility] AH: Hadii mariann garrison hadasho Sohowieali, waaxda luqadaha, qaybta kaalmada adeegyada, anastasia dallas . So [facility] [phone].    ATENCIÓN: Si habla español, tiene a brewster disposición servicios gratuitos de asistencia lingüística. Llame al [phone].    We comply with applicable federal civil rights laws and [place] laws. We do not discriminate on the basis of race, color, national origin, age, disability, sex, sexual orientation, or gender identity.            Thank you!      Thank you for choosing [facility]  for your care. Our goal is always to provide you with excellent care. Hearing back from our patients is one way we can continue to improve our services. Please take a few minutes to complete the written survey that you may receive in the mail after your visit with us. Thank you!             Your Updated Medication List - Protect others around you: Learn how to safely use, store and throw away your medicines at www.disposemymeds.org.          This list is accurate as of 2/8/18 12:20 PM.  Always use your most recent med list.                   Brand Name Dispense Instructions for use Diagnosis    ACE/ARB/ARNI NOT PRESCRIBED (INTENTIONAL)      Please choose reason not prescribed, below    Controlled type 2 diabetes mellitus without complication, with long-term current use of insulin (H)       alcohol swab prep pads     100 each    Use to swab area of injection/mike as directed.    Controlled type 2 diabetes mellitus without complication, with long-term current use of insulin (H)       blood glucose calibration solution    NO BRAND SPECIFIED    1 Bottle    To accompany: Blood Glucose Monitor Brands: per insurance.    Controlled type 2 diabetes mellitus without complication, with long-term current use of insulin (H)       blood glucose monitoring meter device kit    no brand specified    1 kit    Use to test blood sugar 2 times daily or as directed. Preferred blood glucose meter OR supplies to accompany: Blood Glucose Monitor Brands: per insurance.    Controlled type 2 diabetes mellitus without complication, with long-term current use of insulin (H)       blood glucose monitoring test strip    no brand specified    100 strip    Use to test blood sugar 2 times daily or as directed. To accompany: Blood Glucose Monitor Brands: per insurance.    Controlled type 2 diabetes mellitus without complication, with long-term current use of insulin (H)       levonorgestrel-ethinyl  estradiol 0.15-0.03 MG per tablet    QUASENSE    91 tablet    TAKE 1 TABLET BY MOUTH DAILY AS DIRECTED. TAKE IT CONTINUOUSLY    Encounter for surveillance of contraceptive pills       PARoxetine 20 MG tablet    PAXIL    225 tablet    TAKE 2 1/2 TABLETS BY MOUTH EVERY NIGHT AT BEDTIME    Anxiety, Major depressive disorder, recurrent episode, mild (H)       STATIN NOT PRESCRIBED (INTENTIONAL)      Please choose reason not prescribed, below    Controlled type 2 diabetes mellitus without complication, with long-term current use of insulin (H)       thin lancets    NO BRAND SPECIFIED    200 each    Use with lanceting device. To accompany: Blood Glucose Monitor Brands: per insurance.    Controlled type 2 diabetes mellitus without complication, with long-term current use of insulin (H)

## 2018-02-08 NOTE — LETTER
My Depression Action Plan  Name: Radha Wilson   Date of Birth 1980  Date: 1/31/2018    My doctor: Katy Moore   My clinic: [facility]  [id number] [place], [place] [address] [address] [phone]          GREEN    ZONE   Good Control    What it looks like:     Things are going generally well. You have normal up s and down s. You may even feel depressed from time to time, but bad moods usually last less than a day.   What you need to do:  1. Continue to care for yourself (see self care plan)  2. Check your depression survival kit and update it as needed  3. Follow your physician s recommendations including any medication.  4. Do not stop taking medication unless you consult with your physician first.           YELLOW         ZONE Getting Worse    What it looks like:     Depression is starting to interfere with your life.     It may be hard to get out of bed; you may be starting to isolate yourself from others.    Symptoms of depression are starting to last most all day and this has happened for several days.     You may have suicidal thoughts but they are not constant.   What you need to do:     1. Call your care team, your response to treatment will improve if you keep your care team informed of your progress. Yellow periods are signs an adjustment may need to be made.     2. Continue your self-care, even if you have to fake it!    3. Talk to someone in your support network    4. Open up your depression survival kit           RED    ZONE Medical Alert - Get Help    What it looks like:     Depression is seriously interfering with your life.     You may experience these or other symptoms: You can t get out of bed most days, can t work or engage in other necessary activities, you have trouble taking care of basic hygiene, or basic responsibilities, thoughts of suicide or death that will not go away, self-injurious behavior.     What you need to do:  1. Call your care team and  request a same-day appointment. If they are not available (weekends or after hours) call your local crisis line, emergency room or 911.          Depression Self Care Plan / Survival Kit    Self-Care for Depression  Here s the deal. Your body and mind are really not as separate as most people think.  What you do and think affects how you feel and how you feel influences what you do and think. This means if you do things that people who feel good do, it will help you feel better.  Sometimes this is all it takes.  There is also a place for medication and therapy depending on how severe your depression is, so be sure to consult with your medical provider and/ or Behavioral Health Consultant if your symptoms are worsening or not improving.     In order to better manage my stress, I will:    Exercise  Get some form of exercise, every day. This will help reduce pain and release endorphins, the  feel good  chemicals in your brain. This is almost as good as taking antidepressants!  This is not the same as joining a gym and then never going! (they count on that by the way ) It can be as simple as just going for a walk or doing some gardening, anything that will get you moving.      Hygiene   Maintain good hygiene (Get out of bed in the morning, Make your bed, Brush your teeth, Take a shower, and Get dressed like you were going to work, even if you are unemployed).  If your clothes don't fit try to get ones that do.    Diet  I will strive to eat foods that are good for me, drink plenty of water, and avoid excessive sugar, caffeine, alcohol, and other mood-altering substances.  Some foods that are helpful in depression are: complex carbohydrates, B vitamins, flaxseed, fish or fish oil, fresh fruits and vegetables.    Psychotherapy  I agree to participate in Individual Therapy (if recommended).    Medication  If prescribed medications, I agree to take them.  Missing doses can result in serious side effects.  I understand that  drinking alcohol, or other illicit drug use, may cause potential side effects.  I will not stop my medication abruptly without first discussing it with my provider.    Staying Connected With Others  I will stay in touch with my friends, family members, and my primary care provider/team.    Use your imagination  Be creative.  We all have a creative side; it doesn t matter if it s oil painting, sand castles, or mud pies! This will also kick up the endorphins.    Witness Beauty  (AKA stop and smell the roses) Take a look outside, even in mid-winter. Notice colors, textures. Watch the squirrels and birds.     Service to others  Be of service to others.  There is always someone else in need.  By helping others we can  get out of ourselves  and remember the really important things.  This also provides opportunities for practicing all the other parts of the program.    Humor  Laugh and be silly!  Adjust your TV habits for less news and crime-drama and more comedy.    Control your stress  Try breathing deep, massage therapy, biofeedback, and meditation. Find time to relax each day.     My support system    Clinic Contact:  Phone number:    Contact 1:  Phone number:    Contact 2:  Phone number:    Yarsani/:  Phone number:    Therapist:  Phone number:    Local crisis center:    Phone number:    Other community support:  Phone number:

## 2018-02-08 NOTE — NURSING NOTE
"Chief Complaint   Patient presents with     Physical     Depression     Diabetes       Initial /79  Pulse 88  Temp 97.7  F (36.5  C) (Oral)  Ht 5' 3\" (1.6 m)  Wt 173 lb (78.5 kg)  BMI 30.65 kg/m2 Estimated body mass index is 30.65 kg/(m^2) as calculated from the following:    Height as of this encounter: 5' 3\" (1.6 m).    Weight as of this encounter: 173 lb (78.5 kg).  Medication Reconciliation: complete  Negrita Webster , KALIN     "

## 2018-02-09 PROBLEM — Z79.4 CONTROLLED TYPE 2 DIABETES MELLITUS WITHOUT COMPLICATION, WITH LONG-TERM CURRENT USE OF INSULIN (H): Status: ACTIVE | Noted: 2018-02-09

## 2018-02-09 PROBLEM — E11.9 CONTROLLED TYPE 2 DIABETES MELLITUS WITHOUT COMPLICATION, WITH LONG-TERM CURRENT USE OF INSULIN (H): Status: ACTIVE | Noted: 2018-02-09

## 2018-02-09 ASSESSMENT — PATIENT HEALTH QUESTIONNAIRE - PHQ9: SUM OF ALL RESPONSES TO PHQ QUESTIONS 1-9: 5

## 2018-09-20 ENCOUNTER — MYC MEDICAL ADVICE (OUTPATIENT)
Dept: NURSING | Facility: CLINIC | Age: 38
End: 2018-09-20

## 2018-09-21 ASSESSMENT — PATIENT HEALTH QUESTIONNAIRE - PHQ9
SUM OF ALL RESPONSES TO PHQ QUESTIONS 1-9: 6
SUM OF ALL RESPONSES TO PHQ QUESTIONS 1-9: 6
10. IF YOU CHECKED OFF ANY PROBLEMS, HOW DIFFICULT HAVE THESE PROBLEMS MADE IT FOR YOU TO DO YOUR WORK, TAKE CARE OF THINGS AT HOME, OR GET ALONG WITH OTHER PEOPLE: SOMEWHAT DIFFICULT

## 2018-09-22 ASSESSMENT — PATIENT HEALTH QUESTIONNAIRE - PHQ9: SUM OF ALL RESPONSES TO PHQ QUESTIONS 1-9: 6

## 2019-02-05 NOTE — PATIENT INSTRUCTIONS
Start metformin 1 tablet daily.      Check sugar each morning.      Notify Katy Moore MD when you have been on the metformin 2 weeks and doing well.      Preventive Health Recommendations  Female Ages 40 to 49    Yearly exam:     See your health care provider every year in order to  1. Review health changes.   2. Discuss preventive care.    3. Review your medicines if your doctor prescribed any.      Get a Pap test every three years (unless you have an abnormal result and your provider advises testing more often).      If you get Pap tests with HPV test, you only need to test every 5 years, unless you have an abnormal result. You do not need a Pap test if your uterus was removed (hysterectomy) and you have not had cancer.      You should be tested each year for STDs (sexually transmitted diseases), if you're at risk.     Ask your doctor if you should have a mammogram.      Have a colonoscopy (test for colon cancer) if someone in your family has had colon cancer or polyps before age 50.       Have a cholesterol test every 5 years.       Have a diabetes test (fasting glucose) after age 45. If you are at risk for diabetes, you should have this test every 3 years.    Shots: Get a flu shot each year. Get a tetanus shot every 10 years.     Nutrition:     Eat at least 5 servings of fruits and vegetables each day.    Eat whole-grain bread, whole-wheat pasta and brown rice instead of white grains and rice.    Get adequate Calcium and Vitamin D.      Lifestyle    Exercise at least 150 minutes a week (an average of 30 minutes a day, 5 days a week). This will help you control your weight and prevent disease.    Limit alcohol to one drink per day.    No smoking.     Wear sunscreen to prevent skin cancer.    See your dentist every six months for an exam and cleaning.

## 2019-02-05 NOTE — PROGRESS NOTES
"   SUBJECTIVE:   CC: Radha Wilson is an 38 year old woman who presents for preventive health visit.     Healthy Habits:    Do you get at least three servings of calcium containing foods daily (dairy, green leafy vegetables, etc.)? { :245129::\"yes\"}    Amount of exercise or daily activities, outside of work: { :612445}    Problems taking medications regularly { :199569::\"No\"}    Medication side effects: { :386681::\"No\"}    Have you had an eye exam in the past two years? { :626811}    Do you see a dentist twice per year? { :444661}    Do you have sleep apnea, excessive snoring or daytime drowsiness?{ :997169}  {Outside tests to abstract? :890220}    {additional problems to add (Optional):229947}    Today's PHQ-2 Score:   PHQ-2 ( 1999 Pfizer) 2/7/2018 1/14/2017   Q1: Little interest or pleasure in doing things 1 -   Q2: Feeling down, depressed or hopeless 0 -   PHQ-2 Score 1 -   Q1: Little interest or pleasure in doing things Several days Several days   Q2: Feeling down, depressed or hopeless Not at all Not at all   PHQ-2 Score 1 1     {PHQ-2 LOOK IN ASSESSMENTS (Optional) :615981}  Abuse: Current or Past(Physical, Sexual or Emotional)- {YES/NO/NA:855679}  Do you feel safe in your environment? {YES/NO/NA:924776}    Social History     Tobacco Use     Smoking status: Never Smoker     Smokeless tobacco: Never Used     Tobacco comment: Nonsmoking household   Substance Use Topics     Alcohol use: No     Comment: Minimal     If you drink alcohol do you typically have >3 drinks per day or >7 drinks per week? {ETOH :342642}                     Reviewed orders with patient.  Reviewed health maintenance and updated orders accordingly - {Yes/No:723855::\"Yes\"}  {Chronicprobdata (Optional):732903}    {Mammo Decision Support (Optional):704098}    Pertinent mammograms are reviewed under the imaging tab.  History of abnormal Pap smear: {PAP HX:865791}  PAP / HPV 1/6/2015 12/16/2011   PAP NIL NIL     Reviewed and updated as needed " "this visit by clinical staff         Reviewed and updated as needed this visit by Provider        {HISTORY OPTIONS (Optional):204210}    ROS:  { :846711}    OBJECTIVE:   There were no vitals taken for this visit.  EXAM:  {Exam Choices:351989}    {Diagnostic Test Results (Optional):286831::\"Diagnostic Test Results:\",\"none \"}    ASSESSMENT/PLAN:   {Diag Picklist:772958}    COUNSELING:   {FEMALE COUNSELING MESSAGES:638058::\"Reviewed preventive health counseling, as reflected in patient instructions\"}    BP Readings from Last 1 Encounters:   02/08/18 130/79     Estimated body mass index is 30.65 kg/m  as calculated from the following:    Height as of 2/8/18: 1.6 m (5' 3\").    Weight as of 2/8/18: 78.5 kg (173 lb).    {BP Counseling- Complete if BP >= 120/80  (Optional):651253}  {Weight Management Plan (ACO) Complete if BMI is abnormal-  Ages 18-64  BMI >24.9.  Age 65+ with BMI <23 or >30 (Optional):448987}     reports that  has never smoked. she has never used smokeless tobacco.  {Tobacco Cessation -- Complete if patient is a smoker (Optional):088768}    Counseling Resources:  ATP IV Guidelines  Pooled Cohorts Equation Calculator  Breast Cancer Risk Calculator  FRAX Risk Assessment  ICSI Preventive Guidelines  Dietary Guidelines for Americans, 2010  USDA's MyPlate  ASA Prophylaxis  Lung CA Screening    Katy Moore MD  [facility]  "

## 2019-02-10 ASSESSMENT — ENCOUNTER SYMPTOMS
HEARTBURN: 0
BREAST MASS: 0
CHILLS: 0
HEADACHES: 0
HEMATURIA: 0
NERVOUS/ANXIOUS: 0
PALPITATIONS: 0
MYALGIAS: 0
DIARRHEA: 0
SHORTNESS OF BREATH: 0
NAUSEA: 0
ABDOMINAL PAIN: 0
FREQUENCY: 0
PARESTHESIAS: 0
SORE THROAT: 0
DYSURIA: 0
JOINT SWELLING: 0
COUGH: 0
HEMATOCHEZIA: 0
DIZZINESS: 0
WEAKNESS: 0
ARTHRALGIAS: 0
CONSTIPATION: 0
FEVER: 0
EYE PAIN: 0

## 2019-02-11 ASSESSMENT — ENCOUNTER SYMPTOMS
SORE THROAT: 0
WEAKNESS: 0
DYSURIA: 0
HEMATURIA: 0
PALPITATIONS: 0
EYE PAIN: 0
SHORTNESS OF BREATH: 0
PARESTHESIAS: 0
DIARRHEA: 0
FREQUENCY: 0
HEMATOCHEZIA: 0
FEVER: 0
ABDOMINAL PAIN: 0
NAUSEA: 0
ARTHRALGIAS: 0
COUGH: 0
NERVOUS/ANXIOUS: 0
DIZZINESS: 0
MYALGIAS: 0
HEARTBURN: 0
CHILLS: 0
HEADACHES: 0
CONSTIPATION: 0
JOINT SWELLING: 0
BREAST MASS: 0

## 2019-02-11 NOTE — PROGRESS NOTES
SUBJECTIVE:   CC: Radha Wilson is an 38 year old woman who presents for preventive health visit.     Physical   Annual:     Getting at least 3 servings of Calcium per day:  Yes    Bi-annual eye exam:  NO    Dental care twice a year:  Yes    Sleep apnea or symptoms of sleep apnea:  Daytime drowsiness    Diet:  Diabetic    Frequency of exercise:  2-3 days/week    Duration of exercise:  Less than 15 minutes    Taking medications regularly:  Yes    Medication side effects:  Not applicable    Additional concerns today:  No    PHQ-2 Total Score: 0          Has not been check glucose,   Is exercising and working on nutrition.  Hemoglobin A1C   Date Value Ref Range Status   02/12/2019 8.5 (H) 0 - 5.6 % Final     Comment:     Normal <5.7% Prediabetes 5.7-6.4%  Diabetes 6.5% or higher - adopted from ADA   consensus guidelines.     02/08/2018 7.1 (H) 4.3 - 6.0 % Final   11/16/2017 6.8 (H) 4.3 - 6.0 % Final   did not f/u as instructed after last visit    Today's PHQ-2 Score:   PHQ-2 ( 1999 Pfizer) 2/10/2019   Q1: Little interest or pleasure in doing things 0   Q2: Feeling down, depressed or hopeless 0   PHQ-2 Score 0   Q1: Little interest or pleasure in doing things Not at all   Q2: Feeling down, depressed or hopeless Not at all   PHQ-2 Score 0     PHQ-9 SCORE 2/8/2018 9/21/2018 1/21/2019   PHQ-9 Total Score - - -   PHQ-9 Total Score MyChart - 6 (Mild depression) 4 (Minimal depression)   PHQ-9 Total Score 5 6 4     MARK-7 SCORE 10/24/2016 1/17/2017 1/21/2019   Total Score - - -   Total Score 12 (moderate anxiety) - 1 (minimal anxiety)   Total Score - 12 1         Abuse: Current or Past(Physical, Sexual or Emotional)- No  Do you feel safe in your environment? Yes    Social History     Tobacco Use     Smoking status: Never Smoker     Smokeless tobacco: Never Used     Tobacco comment: Nonsmoking household   Substance Use Topics     Alcohol use: No     Comment: Minimal     Alcohol Use 2/10/2019   If you drink alcohol do you  typically have greater than 3 drinks per day OR greater than 7 drinks per week? No   No flowsheet data found.    Reviewed orders with patient.  Reviewed health maintenance and updated orders accordingly - Yes    G 0 P 0   No LMP recorded. Patient is not currently having periods (Reason: Birth Control).     Fasting: No   Td: tdap 2010       Last 3 Pap and HPV Results:   PAP / HPV 1/6/2015 12/16/2011   PAP NIL NIL        HPV neg 1/15          Cholesterol:   Lab Results   Component Value Date    CHOL  07/21/2017     Test canceled after completion  CORRECTED ON 07/24 AT 1114: PREVIOUSLY REPORTED  Desirable:       <200   mg/dl CACC      CHOL 238 07/21/2017     Lab Results   Component Value Date    HDL  07/21/2017     Test canceled after completion  CORRECTED ON 07/24 AT 1113: PREVIOUSLY REPORTED AS 46      HDL 46 07/21/2017     Lab Results   Component Value Date    LDL  07/21/2017     Test canceled after completion  CORRECTED ON 07/24 AT 1113: PREVIOUSLY REPORTED  Above desirable:  100 129   mg/dl Borderline High:  130 159 mg/dL High:             160 189 mg/dL Very   high:       >189 mg/dl       07/21/2017     Lab Results   Component Value Date    TRIG  07/21/2017     Test canceled after completion  CORRECTED ON 07/24 AT 1113: PREVIOUSLY REPORTED  Borderline high:  150 199   mg/dl High:             200 499 mg/dl Very high:       >499 mg/dl      TRIG 319 07/21/2017     Lab Results   Component Value Date    CHOLHDLRATIO 4.2 11/14/2011         MMG: NA  Dexa:  NA     Flex/colo: NA      Seat Belt: Yes    Sunscreen use: Yes   Calcium Intake: adeq  Health Care Directive: Yes   Sexually Active: Yes     Current contraception: oral contraceptives  History of abnormal Pap smear: Yes: see pmh  Family history of colon/breast/ovarian cancer: No  Regular self breast exam: Yes  History of abnormal mammogram: No      BP Readings from Last 3 Encounters:   02/12/19 (!) 145/92   02/08/18 130/79   08/24/17 129/75     Wt Readings from Last 3 Encounters:   02/12/19 81.6 kg (180 lb)   02/08/18 78.5 kg (173 lb)   12/14/17 81.6 kg (180 lb)                  Patient Active Problem List   Diagnosis     CARDIOVASCULAR SCREENING; LDL GOAL LESS THAN 160     Mild major depression (H)     Anxiety     Advanced directives, counseling/discussion     Myopia     Controlled type 2 diabetes mellitus without complication, with long-term current use of insulin (H)     Past Surgical History:   Procedure Laterality Date     ABDOMEN SURGERY       APPENDECTOMY  5/19/12    [facility]     EXAM OF VAGINA,COLPOSCOPY  9/20/06    Benign     HC TOOTH EXTRACTION W/FORCEP       TONSILLECTOMY & ADENOIDECTOMY         Social History     Tobacco Use     Smoking status: Never Smoker     Smokeless tobacco: Never Used     Tobacco comment: Nonsmoking household   Substance Use Topics     Alcohol use: No     Comment: Minimal     Family History   Problem Relation Age of Onset     Cancer Mother         lymphoma     Other Cancer Mother         1994-current     Lipids Father      Hyperlipidemia Father      Heart Disease Maternal Grandfather         70s     Cancer - colorectal Paternal Grandmother         80s     Glaucoma Paternal Grandmother      Anesthesia Reaction Other         lung problems during appendectomy         Current Outpatient Medications   Medication Sig Dispense Refill     ACE/ARB NOT PRESCRIBED, INTENTIONAL, Please choose reason not prescribed, below       alcohol swab prep pads Use to swab area of injection/mike as directed. 100 each 3     blood glucose calibration (NO BRAND SPECIFIED) solution To accompany: Blood Glucose Monitor Brands: per insurance. 1 Bottle 3     blood glucose monitoring (NO BRAND SPECIFIED) meter device kit Use to test blood sugar 2 times daily or as directed. Preferred blood glucose meter OR supplies to accompany: Blood Glucose Monitor Brands: per insurance. 1 kit 0     blood glucose monitoring (NO BRAND SPECIFIED) test strip Use to  test blood sugar 2 times daily or as directed. To accompany: Blood Glucose Monitor Brands: per insurance. 100 strip 6     levonorgestrel-ethinyl estradiol (QUASENSE) 0.15-0.03 MG tablet TAKE 1 TABLET BY MOUTH DAILY AS DIRECTED. TAKE IT CONTINUOUSLY 91 tablet 3     metFORMIN (GLUCOPHAGE-XR) 500 MG 24 hr tablet Take 1 tablet (500 mg) by mouth daily (with dinner) 30 tablet 1     PARoxetine (PAXIL-CR) 25 MG 24 hr tablet Take 1 tablet (25 mg) by mouth every morning 90 tablet 1     STATIN NOT PRESCRIBED, INTENTIONAL, Please choose reason not prescribed, below       thin (NO BRAND SPECIFIED) lancets Use with lanceting device. To accompany: Blood Glucose Monitor Brands: per insurance. 200 each 6     Recent Labs   Lab Test 02/12/19  0915 02/08/18  1209 11/16/17  1138 08/24/17  1413  07/21/17  1014 09/19/16  0736 11/14/11  0951   A1C 8.5* 7.1* 6.8*  --    < >  --   --   --    LDL  --   --   --   --   --  128  Test canceled after completion  CORRECTED ON 07/24 AT 1113: PREVIOUSLY REPORTED  Above desirable:  100 129   mg/dl Borderline High:  130 159 mg/dL High:             160 189 mg/dL Very   high:       >189 mg/dl   122* 129   HDL  --   --   --   --   --  46*  Test canceled after completion  CORRECTED ON 07/24 AT 1113: PREVIOUSLY REPORTED AS 46   46* 52   TRIG  --   --   --   --   --  319*  Test canceled after completion  CORRECTED ON 07/24 AT 1113: PREVIOUSLY REPORTED  Borderline high:  150 199   mg/dl High:             200 499 mg/dl Very high:       >499 mg/dl   336* 203*   CR  --   --   --  0.83  --   --   --   --    GFRESTIMATED  --   --   --  76  --   --   --   --    GFRESTBLACK  --   --   --  >90  --   --   --   --    POTASSIUM  --   --   --  4.0  --   --   --   --    TSH  --   --   --   --   --  2.35  --   --     < > = values in this interval not displayed.        Mammogram Screening: Patient under age 50, mutual decision reflected in health maintenance.      Pertinent mammograms are reviewed under the  "imaging tab.    Reviewed and updated as needed this visit by clinical staff  Tobacco  Allergies  Meds  Problems  Med Hx  Surg Hx  Fam Hx         Reviewed and updated as needed this visit by Provider  Tobacco  Allergies  Meds  Problems  Med Hx  Surg Hx  Fam Hx            Review of Systems   Constitutional: Negative for chills and fever.   HENT: Negative for congestion, ear pain, hearing loss and sore throat.    Eyes: Negative for pain and visual disturbance.   Respiratory: Negative for cough and shortness of breath.    Cardiovascular: Negative for chest pain, palpitations and peripheral edema.   Gastrointestinal: Negative for abdominal pain, constipation, diarrhea, heartburn, hematochezia and nausea.   Breasts:  Negative for tenderness, breast mass and discharge.   Genitourinary: Negative for dysuria, frequency, genital sores, hematuria, pelvic pain, urgency, vaginal bleeding and vaginal discharge.   Musculoskeletal: Negative for arthralgias, joint swelling and myalgias.   Skin: Negative for rash.   Neurological: Negative for dizziness, weakness, headaches and paresthesias.   Psychiatric/Behavioral: Negative for mood changes. The patient is not nervous/anxious.           OBJECTIVE:   BP (!) 145/92   Pulse 86   Temp 98.9  F (37.2  C) (Oral)   Resp 12   Ht 1.6 m (5' 3\")   Wt 81.6 kg (180 lb)   SpO2 98%   BMI 31.89 kg/m    Physical Exam  GENERAL: healthy, alert and no distress  EYES: Eyes grossly normal to inspection, PERRL and conjunctivae and sclerae normal  HENT: ear canals and TM's normal, nose and mouth without ulcers or lesions  NECK: no adenopathy, no asymmetry, masses, or scars and thyroid normal to palpation  RESP: lungs clear to auscultation - no rales, rhonchi or wheezes  BREAST: normal without masses, tenderness or nipple discharge and no palpable axillary masses or adenopathy  CV: regular rate and rhythm, normal S1 S2, no S3 or S4, no murmur, click or rub, no peripheral edema and " peripheral pulses strong  ABDOMEN: soft, nontender, no hepatosplenomegaly, no masses and bowel sounds normal  MS: no gross musculoskeletal defects noted, no edema  SKIN: no suspicious lesions or rashes  NEURO: Normal strength and tone, mentation intact and speech normal  PSYCH: mentation appears normal, affect normal/bright    Diagnostic Test Results:  none     ASSESSMENT/PLAN:   (Z00.00) Routine general medical examination at a health care facility  (primary encounter diagnosis)  Comment: preventive needs reviewed  Plan: see orders in Epic.     (E11.65) Uncontrolled type 2 diabetes mellitus without complication, without long-term current use of insulin (H)  Comment: a1c over 8  Plan: FOOT EXAM  NO CHARGE [82287.114], Basic         metabolic panel  (Ca, Cl, CO2, Creat, Gluc, K,         Na, BUN), HEMOGLOBIN A1C, Lipid panel reflex to        direct LDL Fasting, Albumin Random Urine         Quantitative with Creat Ratio, metFORMIN         (GLUCOPHAGE-XR) 500 MG 24 hr tablet,         OFFICE/OUTPT VISIT,EST,LEVL III        Start metformin, check glucose daily.  Notify Katy Moore MD after on metformin x 2 weeks and what sugars are.  Message timed to send to pt.  Will likely need to increase metformin  Wt loss and activity reviewed  Recheck bp and a1c in 3 months.    (F33.0) Major depressive disorder, recurrent episode, mild (H)  (F41.9) Anxiety  Comment: stable  Plan: PARoxetine (PAXIL-CR) 25 MG 24 hr tablet,         OFFICE/OUTPT VISIT,EST,LEVL III         Refill x 6 months       (Z30.41) Encounter for surveillance of contraceptive pills  Comment: stable  Plan: levonorgestrel-ethinyl estradiol (QUASENSE)         0.15-0.03 MG tablet        Refill x 1 yr       COUNSELING:  Reviewed preventive health counseling, as reflected in patient instructions  Special attention given to:        Regular exercise       Healthy diet/nutrition    BP Readings from Last 1 Encounters:   02/12/19 (!) 145/92     Estimated body mass index is  "31.89 kg/m  as calculated from the following:    Height as of this encounter: 1.6 m (5' 3\").    Weight as of this encounter: 81.6 kg (180 lb).    BP Screening:   Last 3 BP Readings:    BP Readings from Last 3 Encounters:   02/12/19 (!) 145/92   02/08/18 130/79   08/24/17 129/75       The following was recommended to the patient:  Re-screen within 4 weeks and recommend lifestyle modifications  Weight management plan: Discussed healthy diet and exercise guidelines     reports that  has never smoked. she has never used smokeless tobacco.      Counseling Resources:  ATP IV Guidelines  Pooled Cohorts Equation Calculator  Breast Cancer Risk Calculator  FRAX Risk Assessment  ICSI Preventive Guidelines  Dietary Guidelines for Americans, 2010  USDA's MyPlate  ASA Prophylaxis  Lung CA Screening    Katy Moore MD  [facility]  "

## 2019-02-12 ENCOUNTER — OFFICE VISIT (OUTPATIENT)
Dept: FAMILY MEDICINE | Facility: CLINIC | Age: 39
End: 2019-02-12
Payer: COMMERCIAL

## 2019-02-12 VITALS
SYSTOLIC BLOOD PRESSURE: 145 MMHG | HEIGHT: 63 IN | HEART RATE: 86 BPM | OXYGEN SATURATION: 98 % | DIASTOLIC BLOOD PRESSURE: 92 MMHG | WEIGHT: 180 LBS | BODY MASS INDEX: 31.89 KG/M2 | TEMPERATURE: 98.9 F | RESPIRATION RATE: 12 BRPM

## 2019-02-12 DIAGNOSIS — F33.0 MAJOR DEPRESSIVE DISORDER, RECURRENT EPISODE, MILD (H): ICD-10-CM

## 2019-02-12 DIAGNOSIS — Z30.41 ENCOUNTER FOR SURVEILLANCE OF CONTRACEPTIVE PILLS: ICD-10-CM

## 2019-02-12 DIAGNOSIS — Z00.00 ROUTINE GENERAL MEDICAL EXAMINATION AT A HEALTH CARE FACILITY: Primary | ICD-10-CM

## 2019-02-12 DIAGNOSIS — F41.9 ANXIETY: ICD-10-CM

## 2019-02-12 LAB
ANION GAP SERPL CALCULATED.3IONS-SCNC: 11 MMOL/L (ref 3–14)
BUN SERPL-MCNC: 13 MG/DL (ref 7–30)
CALCIUM SERPL-MCNC: 8.7 MG/DL (ref 8.5–10.1)
CHLORIDE SERPL-SCNC: 104 MMOL/L (ref 94–109)
CHOLEST SERPL-MCNC: 232 MG/DL
CO2 SERPL-SCNC: 22 MMOL/L (ref 20–32)
CREAT SERPL-MCNC: 0.68 MG/DL (ref 0.52–1.04)
CREAT UR-MCNC: 135 MG/DL
GFR SERPL CREATININE-BSD FRML MDRD: >90 ML/MIN/{1.73_M2}
GLUCOSE SERPL-MCNC: 163 MG/DL (ref 70–99)
HBA1C MFR BLD: 8.5 % (ref 0–5.6)
HDLC SERPL-MCNC: 47 MG/DL
LDLC SERPL CALC-MCNC: 131 MG/DL
MICROALBUMIN UR-MCNC: 83 MG/L
MICROALBUMIN/CREAT UR: 61.7 MG/G CR (ref 0–25)
NONHDLC SERPL-MCNC: 185 MG/DL
POTASSIUM SERPL-SCNC: 4.2 MMOL/L (ref 3.4–5.3)
SODIUM SERPL-SCNC: 137 MMOL/L (ref 133–144)
TRIGL SERPL-MCNC: 268 MG/DL

## 2019-02-12 PROCEDURE — 99396 PREV VISIT EST AGE 40-64: CPT | Performed by: FAMILY MEDICINE

## 2019-02-12 PROCEDURE — 83036 HEMOGLOBIN GLYCOSYLATED A1C: CPT | Performed by: FAMILY MEDICINE

## 2019-02-12 PROCEDURE — 36415 COLL VENOUS BLD VENIPUNCTURE: CPT | Performed by: FAMILY MEDICINE

## 2019-02-12 PROCEDURE — 80061 LIPID PANEL: CPT | Performed by: FAMILY MEDICINE

## 2019-02-12 PROCEDURE — 99214 OFFICE O/P EST MOD 30 MIN: CPT | Mod: 25 | Performed by: FAMILY MEDICINE

## 2019-02-12 PROCEDURE — 80048 BASIC METABOLIC PNL TOTAL CA: CPT | Performed by: FAMILY MEDICINE

## 2019-02-12 PROCEDURE — 82043 UR ALBUMIN QUANTITATIVE: CPT | Performed by: FAMILY MEDICINE

## 2019-02-12 PROCEDURE — 99207 C FOOT EXAM  NO CHARGE: CPT | Mod: 25 | Performed by: FAMILY MEDICINE

## 2019-02-12 RX ORDER — METFORMIN HCL 500 MG
500 TABLET, EXTENDED RELEASE 24 HR ORAL
Qty: 30 TABLET | Refills: 1 | Status: SHIPPED | OUTPATIENT
Start: 2019-02-12 | End: 2019-04-15

## 2019-02-12 RX ORDER — PAROXETINE HYDROCHLORIDE HEMIHYDRATE 25 MG/1
25 TABLET, FILM COATED, EXTENDED RELEASE ORAL EVERY MORNING
Qty: 90 TABLET | Refills: 1 | Status: SHIPPED | OUTPATIENT
Start: 2019-02-12 | End: 2019-03-04

## 2019-02-12 RX ORDER — LEVONORGESTREL AND ETHINYL ESTRADIOL 0.15-0.03
KIT ORAL
Qty: 91 TABLET | Refills: 3 | Status: SHIPPED | OUTPATIENT
Start: 2019-02-12 | End: 2020-01-31

## 2019-02-12 RX ORDER — LEVONORGESTREL AND ETHINYL ESTRADIOL 0.15-0.03
KIT ORAL
Qty: 91 TABLET | Refills: 0 | OUTPATIENT
Start: 2019-02-12

## 2019-02-12 ASSESSMENT — MIFFLIN-ST. JEOR: SCORE: 1455.6

## 2019-02-12 NOTE — NURSING NOTE
"Chief Complaint   Patient presents with     Physical       Initial BP (!) 145/92   Pulse 86   Temp 98.9  F (37.2  C) (Oral)   Resp 12   Ht 1.6 m (5' 3\")   Wt 81.6 kg (180 lb)   SpO2 98%   BMI 31.89 kg/m   Estimated body mass index is 31.89 kg/m  as calculated from the following:    Height as of this encounter: 1.6 m (5' 3\").    Weight as of this encounter: 81.6 kg (180 lb)..  BP completed using cuff size: regular    "

## 2019-02-19 ENCOUNTER — MYC MEDICAL ADVICE (OUTPATIENT)
Dept: FAMILY MEDICINE | Facility: CLINIC | Age: 39
End: 2019-02-19

## 2019-02-19 DIAGNOSIS — F33.0 MAJOR DEPRESSIVE DISORDER, RECURRENT EPISODE, MILD (H): ICD-10-CM

## 2019-02-19 DIAGNOSIS — F41.9 ANXIETY: ICD-10-CM

## 2019-02-23 ENCOUNTER — TRANSFERRED RECORDS (OUTPATIENT)
Dept: HEALTH INFORMATION MANAGEMENT | Facility: CLINIC | Age: 39
End: 2019-02-23

## 2019-03-04 RX ORDER — PAROXETINE HYDROCHLORIDE HEMIHYDRATE 25 MG/1
50 TABLET, FILM COATED, EXTENDED RELEASE ORAL EVERY MORNING
Qty: 180 TABLET | Refills: 1 | Status: SHIPPED | OUTPATIENT
Start: 2019-03-04 | End: 2019-07-08

## 2019-05-01 ENCOUNTER — MYC MEDICAL ADVICE (OUTPATIENT)
Dept: FAMILY MEDICINE | Facility: CLINIC | Age: 39
End: 2019-05-01

## 2019-05-01 ENCOUNTER — TELEPHONE (OUTPATIENT)
Dept: FAMILY MEDICINE | Facility: CLINIC | Age: 39
End: 2019-05-01

## 2019-05-01 NOTE — TELEPHONE ENCOUNTER
Panel Management Review      Patient has the following on her problem list:     Depression / Dysthymia review    Measure:  Needs PHQ-9 score of 4 or less during index window.  Administer PHQ-9 and if score is 5 or more, send encounter to provider for next steps.    5 - 7 month window range:     PHQ-9 SCORE 2/8/2018 9/21/2018 1/21/2019   PHQ-9 Total Score - - -   PHQ-9 Total Score MyChart - 6 (Mild depression) 4 (Minimal depression)   PHQ-9 Total Score 5 6 4       If PHQ-9 recheck is 5 or more, route to provider for next steps.    Patient is due for:  None    Diabetes    ASA: Not Required     Last A1C  Lab Results   Component Value Date    A1C 8.5 02/12/2019    A1C 7.1 02/08/2018    A1C 6.8 11/16/2017    A1C 7.1 07/28/2017     A1C tested: FAILED    Last LDL:    Lab Results   Component Value Date    CHOL 232 02/12/2019     Lab Results   Component Value Date    HDL 47 02/12/2019     Lab Results   Component Value Date     02/12/2019     Lab Results   Component Value Date    TRIG 268 02/12/2019     Lab Results   Component Value Date    CHOLHDLRATIO 4.2 11/14/2011     Lab Results   Component Value Date    NHDL 185 02/12/2019       Is the patient on a Statin? NO             Is the patient on Aspirin? NO    Medications     HMG CoA Reductase Inhibitors     STATIN NOT PRESCRIBED, INTENTIONAL,             Last three blood pressure readings:  BP Readings from Last 3 Encounters:   02/12/19 (!) 145/92   02/08/18 130/79   08/24/17 129/75       Date of last diabetes office visit: 2/2018     Tobacco History:     History   Smoking Status     Never Smoker   Smokeless Tobacco     Never Used     Comment: Nonsmoking household           Composite cancer screening  Chart review shows that this patient is due/due soon for the following None  Summary:    Patient is due/failing the following:   A1C    Action needed:   Patient needs office visit for a1C recheck due 5/12/18.    Type of outreach:    Sent [facility] message.    Questions  for provider review:    None                                                                                                                                    Harris Cheung CMA       Chart routed to closed .

## 2019-05-07 NOTE — TELEPHONE ENCOUNTER
Per 2/12/19 Office visit notes patient to have A1c rechecked in May.  Patient states she is scheduled 5/16/19 for lab visit.   Please review orders and sign for upcoming visit.      Harris Cheung, CMA

## 2019-05-16 LAB — HBA1C MFR BLD: 7.3 % (ref 0–5.6)

## 2019-05-16 PROCEDURE — 83036 HEMOGLOBIN GLYCOSYLATED A1C: CPT | Performed by: FAMILY MEDICINE

## 2019-05-16 PROCEDURE — 36415 COLL VENOUS BLD VENIPUNCTURE: CPT | Performed by: FAMILY MEDICINE

## 2019-06-05 ENCOUNTER — MYC MEDICAL ADVICE (OUTPATIENT)
Dept: PEDIATRICS | Facility: CLINIC | Age: 39
End: 2019-06-05

## 2019-06-05 ENCOUNTER — TELEPHONE (OUTPATIENT)
Dept: FAMILY MEDICINE | Facility: CLINIC | Age: 39
End: 2019-06-05

## 2019-07-08 ENCOUNTER — MYC REFILL (OUTPATIENT)
Dept: FAMILY MEDICINE | Facility: CLINIC | Age: 39
End: 2019-07-08

## 2019-07-08 DIAGNOSIS — F33.0 MAJOR DEPRESSIVE DISORDER, RECURRENT EPISODE, MILD (H): ICD-10-CM

## 2019-07-08 DIAGNOSIS — F41.9 ANXIETY: ICD-10-CM

## 2019-07-11 NOTE — TELEPHONE ENCOUNTER
Left message on answering machine for patient to call back to [phone] or respond to [employer] message.  Lizet SHAHN, RN

## 2019-07-12 ASSESSMENT — PATIENT HEALTH QUESTIONNAIRE - PHQ9
SUM OF ALL RESPONSES TO PHQ QUESTIONS 1-9: 6
10. IF YOU CHECKED OFF ANY PROBLEMS, HOW DIFFICULT HAVE THESE PROBLEMS MADE IT FOR YOU TO DO YOUR WORK, TAKE CARE OF THINGS AT HOME, OR GET ALONG WITH OTHER PEOPLE: NOT DIFFICULT AT ALL
SUM OF ALL RESPONSES TO PHQ QUESTIONS 1-9: 6

## 2019-07-12 NOTE — TELEPHONE ENCOUNTER
Patient returned PHQ-9 with elevated score:    PHQ-9 score:    PHQ-9 SCORE 7/12/2019   PHQ-9 Total Score -   PHQ-9 Total Score MyChart 6 (Mild depression)   PHQ-9 Total Score 6       RN sent patient MyChart message asking if patient would like help with their depression and if so, they should make appointment or submit E-visit to PCP.    Olga MERCADO, RN

## 2019-07-13 ASSESSMENT — PATIENT HEALTH QUESTIONNAIRE - PHQ9: SUM OF ALL RESPONSES TO PHQ QUESTIONS 1-9: 6

## 2019-07-15 RX ORDER — PAROXETINE HYDROCHLORIDE HEMIHYDRATE 25 MG/1
50 TABLET, FILM COATED, EXTENDED RELEASE ORAL EVERY MORNING
Qty: 180 TABLET | Refills: 1 | Status: SHIPPED | OUTPATIENT
Start: 2019-07-15 | End: 2020-01-27

## 2019-08-14 ENCOUNTER — TELEPHONE (OUTPATIENT)
Dept: FAMILY MEDICINE | Facility: CLINIC | Age: 39
End: 2019-08-14

## 2019-08-14 NOTE — TELEPHONE ENCOUNTER
Panel Management Review      Patient has the following on her problem list:     Depression / Dysthymia review    Measure:  Needs PHQ-9 score of 4 or less during index window.  Administer PHQ-9 and if score is 5 or more, send encounter to provider for next steps.    5 - 7 month window range:     PHQ-9 SCORE 9/21/2018 1/21/2019 7/12/2019   PHQ-9 Total Score - - -   PHQ-9 Total Score MyChart 6 (Mild depression) 4 (Minimal depression) 6 (Mild depression)   PHQ-9 Total Score 6 4 6       If PHQ-9 recheck is 5 or more, route to provider for next steps.    Patient is due for:  None    Diabetes    ASA: Not Required     Last A1C  Lab Results   Component Value Date    A1C 7.3 05/16/2019    A1C 8.5 02/12/2019    A1C 7.1 02/08/2018    A1C 6.8 11/16/2017    A1C 7.1 07/28/2017     A1C tested: MONITOR    Last LDL:    Lab Results   Component Value Date    CHOL 232 02/12/2019     Lab Results   Component Value Date    HDL 47 02/12/2019     Lab Results   Component Value Date     02/12/2019     Lab Results   Component Value Date    TRIG 268 02/12/2019     Lab Results   Component Value Date    CHOLHDLRATIO 4.2 11/14/2011     Lab Results   Component Value Date    NHDL 185 02/12/2019       Is the patient on a Statin? NO             Is the patient on Aspirin? NO    Medications     HMG CoA Reductase Inhibitors     STATIN NOT PRESCRIBED, INTENTIONAL,             Last three blood pressure readings:  BP Readings from Last 3 Encounters:   02/12/19 (!) 145/92   02/08/18 130/79   08/24/17 129/75       Date of last diabetes office visit: 2/2019     Tobacco History:     History   Smoking Status     Never Smoker   Smokeless Tobacco     Never Used     Comment: Nonsmoking household           Composite cancer screening  Chart review shows that this patient is due/due soon for the following None  Summary:    Patient is due/failing the following:   TSH and A1C    Action needed:   Patient needs office visit for Diabetic check due in September  .    Type of outreach:    Sent [facility] message.    Questions for provider review:    None                                                                                                                                    Harris Cheung CMA       Chart routed to closed .

## 2019-08-26 DIAGNOSIS — E11.9 CONTROLLED TYPE 2 DIABETES MELLITUS WITHOUT COMPLICATION, WITH LONG-TERM CURRENT USE OF INSULIN (H): ICD-10-CM

## 2019-08-26 DIAGNOSIS — Z79.4 CONTROLLED TYPE 2 DIABETES MELLITUS WITHOUT COMPLICATION, WITH LONG-TERM CURRENT USE OF INSULIN (H): ICD-10-CM

## 2019-08-26 PROCEDURE — 36415 COLL VENOUS BLD VENIPUNCTURE: CPT | Performed by: FAMILY MEDICINE

## 2019-08-26 PROCEDURE — 83036 HEMOGLOBIN GLYCOSYLATED A1C: CPT | Performed by: FAMILY MEDICINE

## 2019-08-26 PROCEDURE — 84443 ASSAY THYROID STIM HORMONE: CPT | Performed by: FAMILY MEDICINE

## 2019-08-28 ENCOUNTER — DOCUMENTATION ONLY (OUTPATIENT)
Dept: FAMILY MEDICINE | Facility: CLINIC | Age: 39
End: 2019-08-28

## 2019-08-28 DIAGNOSIS — Z79.4 CONTROLLED TYPE 2 DIABETES MELLITUS WITHOUT COMPLICATION, WITH LONG-TERM CURRENT USE OF INSULIN (H): Primary | ICD-10-CM

## 2019-08-28 DIAGNOSIS — E11.9 CONTROLLED TYPE 2 DIABETES MELLITUS WITHOUT COMPLICATION, WITH LONG-TERM CURRENT USE OF INSULIN (H): Primary | ICD-10-CM

## 2019-08-28 LAB
HBA1C MFR BLD: 7.2 % (ref 0–5.6)
TSH SERPL DL<=0.005 MIU/L-ACNC: 1.73 MU/L (ref 0.4–4)

## 2019-09-18 NOTE — PROGRESS NOTES
Subjective     Radha Wilson is a 39 year old female who presents to clinic today for the following health issues:    History of Present Illness        Diabetes:   She presents for follow up of diabetes.  She is checking home blood glucose a few times a week. She checks blood glucose at bedtime.  Blood glucose is never over 200 and never under 70. She is aware of hypoglycemia symptoms including shakiness. She has no concerns regarding her diabetes at this time.  She is not experiencing numbness or burning in feet, excessive thirst, blurry vision, weight changes or redness, sores or blisters on feet. The patient has had a diabetic eye exam in the last 12 months. Eye exam performed on 02/23/2019.    Diabetes Management Resources    She eats 0-1 servings of fruits and vegetables daily.She consumes 0 sweetened beverage(s) daily.  She is taking medications regularly.       BP Readings from Last 2 Encounters:   09/24/19 (!) 146/78   02/12/19 (!) 145/92     Hemoglobin A1C (%)   Date Value   08/26/2019 7.2 (H)   05/16/2019 7.3 (H)     LDL Cholesterol Calculated (mg/dL)   Date Value   02/12/2019 131 (H)   07/21/2017     Test canceled after completion  CORRECTED ON 07/24 AT 1113: PREVIOUSLY REPORTED  Above desirable:  100 129   mg/dl Borderline High:  130 159 mg/dL High:             160 189 mg/dL Very   high:       >189 mg/dl     07/21/2017 128                 Patient Active Problem List   Diagnosis     CARDIOVASCULAR SCREENING; LDL GOAL LESS THAN 160     Mild major depression (H)     Anxiety     Advanced directives, counseling/discussion     Myopia     Controlled type 2 diabetes mellitus without complication, with long-term current use of insulin (H)     Past Surgical History:   Procedure Laterality Date     ABDOMEN SURGERY       APPENDECTOMY  5/19/12    [facility]     EXAM OF VAGINA,COLPOSCOPY  9/20/06    Benign     HC TOOTH EXTRACTION W/FORCEP       TONSILLECTOMY & ADENOIDECTOMY         Social History      Tobacco Use     Smoking status: Never Smoker     Smokeless tobacco: Never Used     Tobacco comment: Nonsmoking household   Substance Use Topics     Alcohol use: No     Comment: Minimal     Family History   Problem Relation Age of Onset     Cancer Mother         lymphoma     Other Cancer Mother         1994-current     Lipids Father      Hyperlipidemia Father      Bladder Cancer Father      Heart Disease Maternal Grandfather         70s     Cancer - colorectal Paternal Grandmother         80s     Glaucoma Paternal Grandmother      Anesthesia Reaction Other         lung problems during appendectomy         Current Outpatient Medications   Medication Sig Dispense Refill     ACE/ARB NOT PRESCRIBED, INTENTIONAL, Please choose reason not prescribed, below       alcohol swab prep pads Use to swab area of injection/mike as directed. 100 each 3     blood glucose calibration (NO BRAND SPECIFIED) solution To accompany: Blood Glucose Monitor Brands: per insurance. 1 Bottle 3     blood glucose monitoring (NO BRAND SPECIFIED) meter device kit Use to test blood sugar 2 times daily or as directed. Preferred blood glucose meter OR supplies to accompany: Blood Glucose Monitor Brands: per insurance. 1 kit 0     blood glucose monitoring (NO BRAND SPECIFIED) test strip Use to test blood sugar 2 times daily or as directed. To accompany: Blood Glucose Monitor Brands: per insurance. 100 strip 6     Cholecalciferol (VITAMIN D PO) Take by mouth daily       IRON PO Take by mouth daily       levonorgestrel-ethinyl estradiol (QUASENSE) 0.15-0.03 MG tablet TAKE 1 TABLET BY MOUTH DAILY AS DIRECTED. TAKE IT CONTINUOUSLY 91 tablet 3     lisinopril (PRINIVIL/ZESTRIL) 10 MG tablet Take 1 tablet (10 mg) by mouth daily 30 tablet 0     MAGNESIUM PO Take by mouth daily       metFORMIN (GLUCOPHAGE-XR) 500 MG 24 hr tablet Take 2 tablets (1,000 mg) by mouth daily (with dinner) 180 tablet 1     Multiple Vitamins-Minerals (MULTIVITAMIN PO) Take 1 tablet  by mouth daily       PARoxetine (PAXIL-CR) 25 MG 24 hr tablet Take 2 tablets (50 mg) by mouth every morning 180 tablet 1     STATIN NOT PRESCRIBED, INTENTIONAL, Please choose reason not prescribed, below       thin (NO BRAND SPECIFIED) lancets Use with lanceting device. To accompany: Blood Glucose Monitor Brands: per insurance. 200 each 6     Recent Labs   Lab Test 08/26/19  0730 05/16/19  1049 02/12/19  0915  08/24/17  1413  07/21/17  1014 09/19/16  0736   A1C 7.2* 7.3* 8.5*   < >  --    < >  --   --    LDL  --   --  131*  --   --   --  128  Test canceled after completion  CORRECTED ON 07/24 AT 1113: PREVIOUSLY REPORTED  Above desirable:  100 129   mg/dl Borderline High:  130 159 mg/dL High:             160 189 mg/dL Very   high:       >189 mg/dl   122*   HDL  --   --  47*  --   --   --  46*  Test canceled after completion  CORRECTED ON 07/24 AT 1113: PREVIOUSLY REPORTED AS 46   46*   TRIG  --   --  268*  --   --   --  319*  Test canceled after completion  CORRECTED ON 07/24 AT 1113: PREVIOUSLY REPORTED  Borderline high:  150 199   mg/dl High:             200 499 mg/dl Very high:       >499 mg/dl   336*   CR  --   --  0.68  --  0.83  --   --   --    GFRESTIMATED  --   --  >90  --  76  --   --   --    GFRESTBLACK  --   --  >90  --  >90  --   --   --    POTASSIUM  --   --  4.2  --  4.0  --   --   --    TSH 1.73  --   --   --   --   --  2.35  --     < > = values in this interval not displayed.      BP Readings from Last 3 Encounters:   09/24/19 (!) 146/78   02/12/19 (!) 145/92   02/08/18 130/79    Wt Readings from Last 3 Encounters:   09/24/19 83.6 kg (184 lb 3.2 oz)   02/12/19 81.6 kg (180 lb)   02/08/18 78.5 kg (173 lb)                    Reviewed and updated as needed this visit by Provider  Tobacco  Allergies  Meds  Problems  Med Hx  Surg Hx  Fam Hx         Review of Systems   ROS COMP: Constitutional, HEENT, cardiovascular, pulmonary, gi and gu systems are negative, except as otherwise  noted.      Objective    BP (!) 146/78   Pulse 85   Temp 98.8  F (37.1  C) (Oral)   Resp 16   Wt 83.6 kg (184 lb 3.2 oz)   LMP 08/05/2019   SpO2 98%   BMI 32.63 kg/m    Body mass index is 32.63 kg/m .  Physical Exam   GENERAL: healthy, alert and no distress  EYES: Eyes grossly normal to inspection, PERRL and conjunctivae and sclerae normal  RESP: lungs clear to auscultation - no rales, rhonchi or wheezes  CV: regular rate and rhythm, normal S1 S2, no S3 or S4, no murmur, click or rub, no peripheral edema and peripheral pulses strong  MS: no gross musculoskeletal defects noted, no edema  SKIN: no suspicious lesions or rashes  PSYCH: mentation appears normal, affect normal/bright    Diagnostic Test Results:  Labs reviewed in Epic        Assessment & Plan     (E11.9) Controlled type 2 diabetes mellitus without complication, without long-term current use of insulin (H)  (primary encounter diagnosis)  Comment: a1c to goal  Plan: metFORMIN (GLUCOPHAGE-XR) 500 MG 24 hr tablet        Continue metformin, recheck a1c 3 months  Preventive exam due 2/20    (I10) Hypertension goal BP (blood pressure) < 140/90  Comment: not to goal  Plan: lisinopril (PRINIVIL/ZESTRIL) 10 MG tablet        Start lisinopril 10 mg daily f/u 2 wks for pharm bp check  Discussed potential side effects and actions to take if they occur.          Patient Instructions     Start lisinopril 10 mg daily.       Recheck blood pressure in 2 weeks in our pharmacy, watch for dry cough      Return in about 2 weeks (around 10/8/2019) for Pharmacy BP Check.    Katy Moore MD  [facility]

## 2019-09-24 ENCOUNTER — OFFICE VISIT (OUTPATIENT)
Dept: FAMILY MEDICINE | Facility: CLINIC | Age: 39
End: 2019-09-24
Payer: COMMERCIAL

## 2019-09-24 VITALS
SYSTOLIC BLOOD PRESSURE: 146 MMHG | BODY MASS INDEX: 32.63 KG/M2 | DIASTOLIC BLOOD PRESSURE: 78 MMHG | RESPIRATION RATE: 16 BRPM | OXYGEN SATURATION: 98 % | TEMPERATURE: 98.8 F | HEART RATE: 85 BPM | WEIGHT: 184.2 LBS

## 2019-09-24 DIAGNOSIS — E11.9 CONTROLLED TYPE 2 DIABETES MELLITUS WITHOUT COMPLICATION, WITHOUT LONG-TERM CURRENT USE OF INSULIN (H): Primary | ICD-10-CM

## 2019-09-24 DIAGNOSIS — I10 HYPERTENSION GOAL BP (BLOOD PRESSURE) < 140/90: ICD-10-CM

## 2019-09-24 PROCEDURE — 90686 IIV4 VACC NO PRSV 0.5 ML IM: CPT | Performed by: FAMILY MEDICINE

## 2019-09-24 PROCEDURE — 99214 OFFICE O/P EST MOD 30 MIN: CPT | Mod: 25 | Performed by: FAMILY MEDICINE

## 2019-09-24 PROCEDURE — 90471 IMMUNIZATION ADMIN: CPT | Performed by: FAMILY MEDICINE

## 2019-09-24 RX ORDER — METFORMIN HCL 500 MG
1000 TABLET, EXTENDED RELEASE 24 HR ORAL
Qty: 180 TABLET | Refills: 1 | Status: SHIPPED | OUTPATIENT
Start: 2019-09-24 | End: 2020-02-24

## 2019-09-24 RX ORDER — LISINOPRIL 10 MG/1
10 TABLET ORAL DAILY
Qty: 30 TABLET | Refills: 0 | Status: SHIPPED | OUTPATIENT
Start: 2019-09-24 | End: 2019-10-21

## 2019-09-24 NOTE — NURSING NOTE
"Chief Complaint   Patient presents with     Diabetes       Initial BP (!) 143/97   Pulse 85   Temp 98.8  F (37.1  C) (Oral)   Resp 16   Wt 83.6 kg (184 lb 3.2 oz)   LMP 08/05/2019   SpO2 98%   BMI 32.63 kg/m   Estimated body mass index is 32.63 kg/m  as calculated from the following:    Height as of 2/12/19: 1.6 m (5' 3\").    Weight as of this encounter: 83.6 kg (184 lb 3.2 oz).  Medication Reconciliation: complete  Harris Cheung CMA    "

## 2019-09-24 NOTE — PATIENT INSTRUCTIONS
Start lisinopril 10 mg daily.       Recheck blood pressure in 2 weeks in our pharmacy, watch for dry cough

## 2019-09-28 ENCOUNTER — HEALTH MAINTENANCE LETTER (OUTPATIENT)
Age: 39
End: 2019-09-28

## 2019-10-16 ENCOUNTER — ALLIED HEALTH/NURSE VISIT (OUTPATIENT)
Dept: FAMILY MEDICINE | Facility: CLINIC | Age: 39
End: 2019-10-16
Payer: COMMERCIAL

## 2019-10-16 VITALS — SYSTOLIC BLOOD PRESSURE: 127 MMHG | DIASTOLIC BLOOD PRESSURE: 77 MMHG | HEART RATE: 94 BPM

## 2019-10-16 DIAGNOSIS — Z01.30 BP CHECK: Primary | ICD-10-CM

## 2019-10-16 PROCEDURE — 99207 ZZC NO CHARGE NURSE ONLY: CPT | Performed by: FAMILY MEDICINE

## 2019-10-21 ENCOUNTER — MYC MEDICAL ADVICE (OUTPATIENT)
Dept: FAMILY MEDICINE | Facility: CLINIC | Age: 39
End: 2019-10-21

## 2019-10-21 DIAGNOSIS — I10 HYPERTENSION GOAL BP (BLOOD PRESSURE) < 140/90: ICD-10-CM

## 2019-10-21 RX ORDER — LOSARTAN POTASSIUM 25 MG/1
25 TABLET ORAL DAILY
Qty: 90 TABLET | Refills: 1 | Status: SHIPPED | OUTPATIENT
Start: 2019-10-21 | End: 2020-02-24

## 2019-11-04 DIAGNOSIS — F41.9 ANXIETY: ICD-10-CM

## 2019-11-04 DIAGNOSIS — F33.0 MAJOR DEPRESSIVE DISORDER, RECURRENT EPISODE, MILD (H): ICD-10-CM

## 2019-11-04 DIAGNOSIS — I10 HYPERTENSION GOAL BP (BLOOD PRESSURE) < 140/90: ICD-10-CM

## 2019-11-05 RX ORDER — LISINOPRIL 10 MG/1
TABLET ORAL
Qty: 30 TABLET | Refills: 0 | OUTPATIENT
Start: 2019-11-05

## 2019-11-05 RX ORDER — PAROXETINE 20 MG/1
TABLET, FILM COATED ORAL
Qty: 75 TABLET | Refills: 0 | OUTPATIENT
Start: 2019-11-05

## 2019-11-05 NOTE — TELEPHONE ENCOUNTER
Denial sent x 2.  Paxil, incorrect dose request.   Has refills.  Lisinopril is not longer being prescribe.  Now on Losartan.   Trini Palomino RN

## 2019-11-18 ENCOUNTER — ALLIED HEALTH/NURSE VISIT (OUTPATIENT)
Dept: FAMILY MEDICINE | Facility: CLINIC | Age: 39
End: 2019-11-18
Payer: COMMERCIAL

## 2019-11-18 VITALS — DIASTOLIC BLOOD PRESSURE: 82 MMHG | HEART RATE: 80 BPM | SYSTOLIC BLOOD PRESSURE: 133 MMHG

## 2019-11-18 DIAGNOSIS — Z01.30 BP CHECK: Primary | ICD-10-CM

## 2019-11-18 PROCEDURE — 99207 ZZC NO CHARGE NURSE ONLY: CPT | Performed by: FAMILY MEDICINE

## 2020-01-26 DIAGNOSIS — F41.9 ANXIETY: ICD-10-CM

## 2020-01-26 DIAGNOSIS — F33.0 MAJOR DEPRESSIVE DISORDER, RECURRENT EPISODE, MILD (H): ICD-10-CM

## 2020-01-27 RX ORDER — PAROXETINE HYDROCHLORIDE HEMIHYDRATE 25 MG/1
TABLET, FILM COATED, EXTENDED RELEASE ORAL
Qty: 180 TABLET | Refills: 0 | Status: SHIPPED | OUTPATIENT
Start: 2020-01-27 | End: 2020-02-23

## 2020-01-27 NOTE — TELEPHONE ENCOUNTER
"Patient has upcoming/ pending appointment:    Next 5 appointments (look out 90 days)    Feb 24, 2020 10:20 AM CST  PHYSICAL with Katy Moore MD  [facility] ([facility] [id number] [place] [address] [phone]          Rx refilled per [facility] refill protocol.    Olga SHAHN, RN      Requested Prescriptions   Signed Prescriptions Disp Refills    PARoxetine (PAXIL-CR) 25 MG 24 hr tablet 180 tablet 0     Sig: TAKE 2 TABLETS BY MOUTH EVERY MORNING       SSRIs Protocol Failed - 1/26/2020  4:02 AM        Failed - PHQ-9 score less than 5 in past 6 months     Please review last PHQ-9 score.           Passed - Medication is active on med list        Passed - Patient is age 18 or older        Passed - No active pregnancy on record        Passed - No positive pregnancy test in last 12 months        Passed - Recent (6 mo) or future (30 days) visit within the authorizing provider's specialty     Patient had office visit in the last 6 months or has a visit in the next 30 days with authorizing provider or within the authorizing provider's specialty.  See \"Patient Info\" tab in inbasket, or \"Choose Columns\" in Meds & Orders section of the refill encounter.              "

## 2020-02-17 NOTE — PROGRESS NOTES
SUBJECTIVE:   CC: Radha Wilson is an 40 year old woman who presents for preventive health visit.     Healthy Habits:     Getting at least 3 servings of Calcium per day:  Yes    Bi-annual eye exam:  Yes    Dental care twice a year:  Yes    Sleep apnea or symptoms of sleep apnea:  Daytime drowsiness    Diet:  Diabetic    Frequency of exercise:  2-3 days/week    Duration of exercise:  Greater than 60 minutes    Taking medications regularly:  Yes    Medication side effects:  Not applicable    PHQ-2 Total Score: 1    Additional concerns today:  No              Today's PHQ-2 Score:   PHQ-2 ( 1999 [employer]) 2/22/2020   Q1: Little interest or pleasure in doing things 1   Q2: Feeling down, depressed or hopeless 0   PHQ-2 Score 1   Q1: Little interest or pleasure in doing things Several days   Q2: Feeling down, depressed or hopeless Not at all   PHQ-2 Score 1       Abuse: Current or Past(Physical, Sexual or Emotional)- No  Do you feel safe in your environment? Yes        Social History     Tobacco Use     Smoking status: Never Smoker     Smokeless tobacco: Never Used     Tobacco comment: Nonsmoking household   Substance Use Topics     Alcohol use: No     Comment: Minimal         Alcohol Use 2/22/2020   Prescreen: >3 drinks/day or >7 drinks/week? No   Prescreen: >3 drinks/day or >7 drinks/week? -       Reviewed orders with patient.  Reviewed health maintenance and updated orders accordingly - Yes    G 0 P 0   Patient's last menstrual period was 02/10/2020.     Fasting: No   Td: tdap 2010       Last 3 Pap and HPV Results:   PAP / HPV 1/6/2015 12/16/2011   PAP NIL NIL                  Cholesterol:   Lab Results   Component Value Date    CHOL 232 02/12/2019     Lab Results   Component Value Date    HDL 47 02/12/2019     Lab Results   Component Value Date     02/12/2019     Lab Results   Component Value Date    TRIG 268 02/12/2019     Lab Results   Component Value Date    CHOLHDLRATIO 4.2 11/14/2011         MMG:  NA  Dexa:  NA     Flex/colo: NA      Seat Belt: Yes    Sunscreen use: Yes   Calcium Intake: adeq   Health Care Directive: Yes   Sexually Active: Yes     Current contraception: oral contraceptives  History of abnormal Pap smear: Yes: see pmh  Family history of colon/breast/ovarian cancer: No  Regular self breast exam: Yes  History of abnormal mammogram: No      BP Readings from Last 3 Encounters:   02/24/20 139/84   11/18/19 133/82   10/16/19 127/77    Wt Readings from Last 3 Encounters:   02/24/20 82.9 kg (182 lb 12.8 oz)   09/24/19 83.6 kg (184 lb 3.2 oz)   02/12/19 81.6 kg (180 lb)                  Patient Active Problem List   Diagnosis     CARDIOVASCULAR SCREENING; LDL GOAL LESS THAN 160     Mild major depression (H)     Anxiety     Advanced directives, counseling/discussion     Myopia     Controlled type 2 diabetes mellitus without complication, with long-term current use of insulin (H)     Past Surgical History:   Procedure Laterality Date     ABDOMEN SURGERY       APPENDECTOMY  5/19/12    [facility]     EXAM OF VAGINA,COLPOSCOPY  9/20/06    Benign     HC TOOTH EXTRACTION W/FORCEP       TONSILLECTOMY & ADENOIDECTOMY         Social History     Tobacco Use     Smoking status: Never Smoker     Smokeless tobacco: Never Used     Tobacco comment: Nonsmoking household   Substance Use Topics     Alcohol use: No     Comment: Minimal     Family History   Problem Relation Age of Onset     Cancer Mother         lymphoma     Other Cancer Mother         1994-current     Lipids Father      Hyperlipidemia Father      Bladder Cancer Father      Coronary Artery Disease Father         Cause of death     Other Cancer Father         Bladder     Heart Disease Maternal Grandfather         70s     Cancer - colorectal Paternal Grandmother         80s     Glaucoma Paternal Grandmother      Anesthesia Reaction Other         lung problems during appendectomy         Current Outpatient Medications   Medication Sig Dispense Refill      ACE/ARB NOT PRESCRIBED, INTENTIONAL, Please choose reason not prescribed, below       alcohol swab prep pads Use to swab area of injection/mike as directed. 100 each 3     blood glucose calibration (NO BRAND SPECIFIED) solution To accompany: Blood Glucose Monitor Brands: per insurance. 1 Bottle 3     blood glucose monitoring (NO BRAND SPECIFIED) meter device kit Use to test blood sugar 2 times daily or as directed. Preferred blood glucose meter OR supplies to accompany: Blood Glucose Monitor Brands: per insurance. 1 kit 0     blood glucose monitoring (NO BRAND SPECIFIED) test strip Use to test blood sugar 2 times daily or as directed. To accompany: Blood Glucose Monitor Brands: per insurance. 100 strip 6     Cholecalciferol (VITAMIN D PO) Take by mouth daily       IRON PO Take by mouth daily       levonorgestrel-ethinyl estradiol (QUASENSE) 0.15-0.03 MG tablet TAKE 1 TABLET BY MOUTH DAILY AS DIRECTED. TAKE IT CONTINUOUSLY 112 tablet 3     losartan (COZAAR) 25 MG tablet Take 1 tablet (25 mg) by mouth daily 90 tablet 1     MAGNESIUM PO Take by mouth daily       metFORMIN (GLUCOPHAGE-XR) 500 MG 24 hr tablet Take 2 tablets (1,000 mg) by mouth daily (with dinner) 180 tablet 1     Multiple Vitamins-Minerals (MULTIVITAMIN PO) Take 1 tablet by mouth daily       PARoxetine (PAXIL-CR) 25 MG 24 hr tablet TAKE 2 TABLETS BY MOUTH EVERY MORNING 180 tablet 1     STATIN NOT PRESCRIBED, INTENTIONAL, Please choose reason not prescribed, below       thin (NO BRAND SPECIFIED) lancets Use with lanceting device. To accompany: Blood Glucose Monitor Brands: per insurance. 200 each 6     Recent Labs   Lab Test 02/24/20  1032 08/26/19  0730 05/16/19  1049 02/12/19  0915  08/24/17  1413  07/21/17  1014 09/19/16  0736   A1C 7.6* 7.2* 7.3* 8.5*   < >  --    < >  --   --    LDL  --   --   --  131*  --   --   --  128  Test canceled after completion  CORRECTED ON 07/24 AT 1113: PREVIOUSLY REPORTED  Above desirable:  100 129   mg/dl  Borderline High:  130 159 mg/dL High:             160 189 mg/dL Very   high:       >189 mg/dl   122*   HDL  --   --   --  47*  --   --   --  46*  Test canceled after completion  CORRECTED ON 07/24 AT 1113: PREVIOUSLY REPORTED AS 46   46*   TRIG  --   --   --  268*  --   --   --  319*  Test canceled after completion  CORRECTED ON 07/24 AT 1113: PREVIOUSLY REPORTED  Borderline high:  150 199   mg/dl High:             200 499 mg/dl Very high:       >499 mg/dl   336*   CR  --   --   --  0.68  --  0.83  --   --   --    GFRESTIMATED  --   --   --  >90  --  76  --   --   --    GFRESTBLACK  --   --   --  >90  --  >90  --   --   --    POTASSIUM  --   --   --  4.2  --  4.0  --   --   --    TSH  --  1.73  --   --   --   --   --  2.35  --     < > = values in this interval not displayed.        Mammogram Screening: Patient under age 50, mutual decision reflected in health maintenance.      Pertinent mammograms are reviewed under the imaging tab.  Reviewed and updated as needed this visit by clinical staff  Tobacco  Allergies  Meds  Problems  Med Hx  Surg Hx  Fam Hx  Soc Hx          Reviewed and updated as needed this visit by Provider  Tobacco  Allergies  Meds  Problems  Med Hx  Surg Hx  Fam Hx            Review of Systems   Constitutional: Negative for chills and fever.   HENT: Negative for congestion, ear pain, hearing loss and sore throat.    Eyes: Negative for pain and visual disturbance.   Respiratory: Negative for cough and shortness of breath.    Cardiovascular: Negative for chest pain, palpitations and peripheral edema.   Gastrointestinal: Negative for abdominal pain, constipation, diarrhea, heartburn, hematochezia and nausea.   Breasts:  Negative for tenderness, breast mass and discharge.   Genitourinary: Negative for dysuria, frequency, genital sores, hematuria, pelvic pain, urgency, vaginal bleeding and vaginal discharge.   Musculoskeletal: Negative for arthralgias, joint swelling and myalgias.  "  Skin: Negative for rash.   Neurological: Negative for dizziness, weakness, headaches and paresthesias.   Psychiatric/Behavioral: Negative for mood changes. The patient is not nervous/anxious.           OBJECTIVE:   /84   Pulse 85   Temp 98.4  F (36.9  C) (Oral)   Resp 16   Ht 1.6 m (5' 3\")   Wt 82.9 kg (182 lb 12.8 oz)   LMP 02/10/2020   BMI 32.38 kg/m    Physical Exam  GENERAL: healthy, alert and no distress  EYES: Eyes grossly normal to inspection, PERRL and conjunctivae and sclerae normal  HENT: ear canals and TM's normal, nose and mouth without ulcers or lesions  NECK: no adenopathy, no asymmetry, masses, or scars and thyroid normal to palpation  RESP: lungs clear to auscultation - no rales, rhonchi or wheezes  BREAST: normal without masses, tenderness or nipple discharge and no palpable axillary masses or adenopathy  CV: regular rate and rhythm, normal S1 S2, no S3 or S4, no murmur, click or rub, no peripheral edema and peripheral pulses strong  ABDOMEN: soft, nontender, no hepatosplenomegaly, no masses and bowel sounds normal   (female): normal female external genitalia, normal urethral meatus, vaginal mucosa pink, moist, well rugated, and normal cervix/adnexa/uterus without masses or discharge  MS: no gross musculoskeletal defects noted, no edema  SKIN: no suspicious lesions or rashes  NEURO: Normal strength and tone, mentation intact and speech normal  PSYCH: mentation appears normal, affect normal/bright    Diagnostic Test Results:  Labs reviewed in Epic    ASSESSMENT/PLAN:   (Z00.00) Routine general medical examination at a health care facility  (primary encounter diagnosis)  Comment: preventive needs reviewed   Plan: Pap imaged thin layer screen with HPV -         recommended age 30 - 65 years (select HPV order        below), HPV High Risk Types DNA Cervical,         levonorgestrel-ethinyl estradiol (QUASENSE)         0.15-0.03 MG tablet        see orders in Epic.     (E11.9) Controlled " "type 2 diabetes mellitus without complication, without long-term current use of insulin (H)  Comment: a1c up though still below 8  Plan: HEMOGLOBIN A1C, BASIC METABOLIC PANEL, Lipid         panel reflex to direct LDL Fasting, Albumin         Random Urine Quantitative with Creat Ratio,         metFORMIN (GLUCOPHAGE-XR) 500 MG 24 hr tablet,         OFFICE/OUTPT VISIT,EST,LEVL III, **A1C FUTURE         3mo        Continue current metformin,  Refill x 6 months   Reboot nutrition and activity, recheck a1c in 3 months  f/u 6 months for OV and labs     (F33.0) Major depressive disorder, recurrent episode, mild (H)  (F41.9) Anxiety  Comment: controlled  Plan: PARoxetine (PAXIL-CR) 25 MG 24 hr tablet,         OFFICE/OUTPT VISIT,EST,LEVL III         Refill x 6 months       (I10) Hypertension goal BP (blood pressure) < 140/90  Comment: to goal   Plan: BASIC METABOLIC PANEL, losartan (COZAAR) 25 MG         tablet, OFFICE/OUTPT VISIT,EST,LEVL III         Refill x 6 months f/u 6 months for OV and labs       COUNSELING:  Reviewed preventive health counseling, as reflected in patient instructions  Special attention given to:        Regular exercise       Healthy diet/nutrition    Estimated body mass index is 32.38 kg/m  as calculated from the following:    Height as of this encounter: 1.6 m (5' 3\").    Weight as of this encounter: 82.9 kg (182 lb 12.8 oz).    Weight management plan: Discussed healthy diet and exercise guidelines     reports that she has never smoked. She has never used smokeless tobacco.      Counseling Resources:  ATP IV Guidelines  Pooled Cohorts Equation Calculator  Breast Cancer Risk Calculator  FRAX Risk Assessment  ICSI Preventive Guidelines  Dietary Guidelines for Americans, 2010  USDA's MyPlate  ASA Prophylaxis  Lung CA Screening    Katy Moore MD  [facility]  "

## 2020-02-22 DIAGNOSIS — Z30.41 ENCOUNTER FOR SURVEILLANCE OF CONTRACEPTIVE PILLS: ICD-10-CM

## 2020-02-22 ASSESSMENT — ENCOUNTER SYMPTOMS
DIZZINESS: 0
MYALGIAS: 0
PARESTHESIAS: 0
BREAST MASS: 0
WEAKNESS: 0
FEVER: 0
DYSURIA: 0
HEARTBURN: 0
JOINT SWELLING: 0
HEADACHES: 0
NERVOUS/ANXIOUS: 0
DIARRHEA: 0
CHILLS: 0
NAUSEA: 0
ARTHRALGIAS: 0
HEMATOCHEZIA: 0
HEMATURIA: 0
CONSTIPATION: 0
PALPITATIONS: 0
ABDOMINAL PAIN: 0
SHORTNESS OF BREATH: 0
FREQUENCY: 0
SORE THROAT: 0
EYE PAIN: 0
COUGH: 0

## 2020-02-23 ASSESSMENT — ENCOUNTER SYMPTOMS
WEAKNESS: 0
DYSURIA: 0
CHILLS: 0
EYE PAIN: 0
HEMATURIA: 0
NERVOUS/ANXIOUS: 0
JOINT SWELLING: 0
BREAST MASS: 0
MYALGIAS: 0
SORE THROAT: 0
SHORTNESS OF BREATH: 0
ABDOMINAL PAIN: 0
PALPITATIONS: 0
HEMATOCHEZIA: 0
PARESTHESIAS: 0
ARTHRALGIAS: 0
HEARTBURN: 0
NAUSEA: 0
DIZZINESS: 0
CONSTIPATION: 0
FEVER: 0
COUGH: 0
FREQUENCY: 0
DIARRHEA: 0
HEADACHES: 0

## 2020-02-24 ENCOUNTER — OFFICE VISIT (OUTPATIENT)
Dept: FAMILY MEDICINE | Facility: CLINIC | Age: 40
End: 2020-02-24
Payer: COMMERCIAL

## 2020-02-24 VITALS
DIASTOLIC BLOOD PRESSURE: 84 MMHG | HEIGHT: 63 IN | WEIGHT: 182.8 LBS | BODY MASS INDEX: 32.39 KG/M2 | HEART RATE: 85 BPM | TEMPERATURE: 98.4 F | RESPIRATION RATE: 16 BRPM | SYSTOLIC BLOOD PRESSURE: 139 MMHG

## 2020-02-24 DIAGNOSIS — F41.9 ANXIETY: ICD-10-CM

## 2020-02-24 DIAGNOSIS — E11.9 CONTROLLED TYPE 2 DIABETES MELLITUS WITHOUT COMPLICATION, WITHOUT LONG-TERM CURRENT USE OF INSULIN (H): ICD-10-CM

## 2020-02-24 DIAGNOSIS — F33.0 MAJOR DEPRESSIVE DISORDER, RECURRENT EPISODE, MILD (H): ICD-10-CM

## 2020-02-24 DIAGNOSIS — I10 HYPERTENSION GOAL BP (BLOOD PRESSURE) < 140/90: ICD-10-CM

## 2020-02-24 DIAGNOSIS — Z00.00 ROUTINE GENERAL MEDICAL EXAMINATION AT A HEALTH CARE FACILITY: Primary | ICD-10-CM

## 2020-02-24 LAB
ANION GAP SERPL CALCULATED.3IONS-SCNC: 8 MMOL/L (ref 3–14)
BUN SERPL-MCNC: 13 MG/DL (ref 7–30)
CALCIUM SERPL-MCNC: 9 MG/DL (ref 8.5–10.1)
CHLORIDE SERPL-SCNC: 102 MMOL/L (ref 94–109)
CHOLEST SERPL-MCNC: 216 MG/DL
CO2 SERPL-SCNC: 24 MMOL/L (ref 20–32)
CREAT SERPL-MCNC: 0.71 MG/DL (ref 0.52–1.04)
GFR SERPL CREATININE-BSD FRML MDRD: >90 ML/MIN/{1.73_M2}
GLUCOSE SERPL-MCNC: 173 MG/DL (ref 70–99)
HBA1C MFR BLD: 7.6 % (ref 0–5.6)
HDLC SERPL-MCNC: 58 MG/DL
LDLC SERPL CALC-MCNC: 109 MG/DL
NONHDLC SERPL-MCNC: 158 MG/DL
POTASSIUM SERPL-SCNC: 4.2 MMOL/L (ref 3.4–5.3)
SODIUM SERPL-SCNC: 134 MMOL/L (ref 133–144)
TRIGL SERPL-MCNC: 247 MG/DL

## 2020-02-24 PROCEDURE — 36415 COLL VENOUS BLD VENIPUNCTURE: CPT | Performed by: FAMILY MEDICINE

## 2020-02-24 PROCEDURE — 80048 BASIC METABOLIC PNL TOTAL CA: CPT | Performed by: FAMILY MEDICINE

## 2020-02-24 PROCEDURE — 99396 PREV VISIT EST AGE 40-64: CPT | Mod: 25 | Performed by: FAMILY MEDICINE

## 2020-02-24 PROCEDURE — 99213 OFFICE O/P EST LOW 20 MIN: CPT | Mod: 25 | Performed by: FAMILY MEDICINE

## 2020-02-24 PROCEDURE — 80061 LIPID PANEL: CPT | Performed by: FAMILY MEDICINE

## 2020-02-24 PROCEDURE — 90715 TDAP VACCINE 7 YRS/> IM: CPT | Performed by: FAMILY MEDICINE

## 2020-02-24 PROCEDURE — 90471 IMMUNIZATION ADMIN: CPT | Performed by: FAMILY MEDICINE

## 2020-02-24 PROCEDURE — 82043 UR ALBUMIN QUANTITATIVE: CPT | Performed by: FAMILY MEDICINE

## 2020-02-24 PROCEDURE — 87624 HPV HI-RISK TYP POOLED RSLT: CPT | Performed by: FAMILY MEDICINE

## 2020-02-24 PROCEDURE — G0145 SCR C/V CYTO,THINLAYER,RESCR: HCPCS | Performed by: FAMILY MEDICINE

## 2020-02-24 PROCEDURE — 83036 HEMOGLOBIN GLYCOSYLATED A1C: CPT | Performed by: FAMILY MEDICINE

## 2020-02-24 RX ORDER — LEVONORGESTREL AND ETHINYL ESTRADIOL 0.15-0.03
KIT ORAL
Qty: 28 TABLET | OUTPATIENT
Start: 2020-02-24

## 2020-02-24 RX ORDER — METFORMIN HCL 500 MG
1000 TABLET, EXTENDED RELEASE 24 HR ORAL
Qty: 180 TABLET | Refills: 1 | Status: SHIPPED | OUTPATIENT
Start: 2020-02-24 | End: 2020-05-28

## 2020-02-24 RX ORDER — LOSARTAN POTASSIUM 25 MG/1
25 TABLET ORAL DAILY
Qty: 90 TABLET | Refills: 1 | Status: SHIPPED | OUTPATIENT
Start: 2020-02-24 | End: 2020-10-14

## 2020-02-24 RX ORDER — PAROXETINE HYDROCHLORIDE HEMIHYDRATE 25 MG/1
TABLET, FILM COATED, EXTENDED RELEASE ORAL
Qty: 180 TABLET | Refills: 1 | Status: SHIPPED | OUTPATIENT
Start: 2020-02-24 | End: 2020-11-04

## 2020-02-24 RX ORDER — LEVONORGESTREL AND ETHINYL ESTRADIOL 0.15-0.03
KIT ORAL
Qty: 112 TABLET | Refills: 3 | Status: SHIPPED | OUTPATIENT
Start: 2020-02-24 | End: 2020-03-05

## 2020-02-24 ASSESSMENT — ANXIETY QUESTIONNAIRES
6. BECOMING EASILY ANNOYED OR IRRITABLE: NOT AT ALL
IF YOU CHECKED OFF ANY PROBLEMS ON THIS QUESTIONNAIRE, HOW DIFFICULT HAVE THESE PROBLEMS MADE IT FOR YOU TO DO YOUR WORK, TAKE CARE OF THINGS AT HOME, OR GET ALONG WITH OTHER PEOPLE: NOT DIFFICULT AT ALL
5. BEING SO RESTLESS THAT IT IS HARD TO SIT STILL: NOT AT ALL
2. NOT BEING ABLE TO STOP OR CONTROL WORRYING: NOT AT ALL
GAD7 TOTAL SCORE: 0
7. FEELING AFRAID AS IF SOMETHING AWFUL MIGHT HAPPEN: NOT AT ALL
1. FEELING NERVOUS, ANXIOUS, OR ON EDGE: NOT AT ALL
3. WORRYING TOO MUCH ABOUT DIFFERENT THINGS: NOT AT ALL

## 2020-02-24 ASSESSMENT — PATIENT HEALTH QUESTIONNAIRE - PHQ9
SUM OF ALL RESPONSES TO PHQ QUESTIONS 1-9: 2
5. POOR APPETITE OR OVEREATING: NOT AT ALL

## 2020-02-24 ASSESSMENT — MIFFLIN-ST. JEOR: SCORE: 1458.31

## 2020-02-24 NOTE — NURSING NOTE
"Chief Complaint   Patient presents with     Physical     Health Maintenance     phq-9, eye exam        Initial /84   Pulse 85   Temp 98.4  F (36.9  C) (Oral)   Resp 16   Ht 1.6 m (5' 3\")   Wt 82.9 kg (182 lb 12.8 oz)   LMP 02/10/2020   BMI 32.38 kg/m   Estimated body mass index is 32.38 kg/m  as calculated from the following:    Height as of this encounter: 1.6 m (5' 3\").    Weight as of this encounter: 82.9 kg (182 lb 12.8 oz).  Medication Reconciliation: complete  Harris Cheung CMA    "

## 2020-02-25 ENCOUNTER — TELEPHONE (OUTPATIENT)
Dept: FAMILY MEDICINE | Facility: CLINIC | Age: 40
End: 2020-02-25

## 2020-02-25 ENCOUNTER — MYC MEDICAL ADVICE (OUTPATIENT)
Dept: FAMILY MEDICINE | Facility: CLINIC | Age: 40
End: 2020-02-25

## 2020-02-25 LAB
CREAT UR-MCNC: 145 MG/DL
MICROALBUMIN UR-MCNC: 31 MG/L
MICROALBUMIN/CREAT UR: 21.45 MG/G CR (ref 0–25)

## 2020-02-25 ASSESSMENT — ANXIETY QUESTIONNAIRES: GAD7 TOTAL SCORE: 0

## 2020-02-27 LAB
COPATH REPORT: NORMAL
PAP: NORMAL

## 2020-03-01 LAB
FINAL DIAGNOSIS: NORMAL
HPV HR 12 DNA CVX QL NAA+PROBE: NEGATIVE
HPV16 DNA SPEC QL NAA+PROBE: NEGATIVE
HPV18 DNA SPEC QL NAA+PROBE: NEGATIVE
SPECIMEN DESCRIPTION: NORMAL
SPECIMEN SOURCE CVX/VAG CYTO: NORMAL

## 2020-03-05 ENCOUNTER — MYC REFILL (OUTPATIENT)
Dept: FAMILY MEDICINE | Facility: CLINIC | Age: 40
End: 2020-03-05

## 2020-03-05 DIAGNOSIS — Z00.00 ROUTINE GENERAL MEDICAL EXAMINATION AT A HEALTH CARE FACILITY: ICD-10-CM

## 2020-03-05 RX ORDER — LEVONORGESTREL AND ETHINYL ESTRADIOL 0.15-0.03
KIT ORAL
Qty: 112 TABLET | Refills: 3 | Status: SHIPPED | OUTPATIENT
Start: 2020-03-05 | End: 2021-02-19

## 2020-03-13 DIAGNOSIS — Z00.00 ROUTINE GENERAL MEDICAL EXAMINATION AT A HEALTH CARE FACILITY: ICD-10-CM

## 2020-03-16 RX ORDER — LEVONORGESTREL AND ETHINYL ESTRADIOL 0.15-0.03
KIT ORAL
Qty: 91 TABLET | OUTPATIENT
Start: 2020-03-16

## 2020-05-28 DIAGNOSIS — E11.9 CONTROLLED TYPE 2 DIABETES MELLITUS WITHOUT COMPLICATION, WITHOUT LONG-TERM CURRENT USE OF INSULIN (H): ICD-10-CM

## 2020-05-28 RX ORDER — METFORMIN HCL 500 MG
TABLET, EXTENDED RELEASE 24 HR ORAL
Qty: 180 TABLET | Refills: 0 | Status: SHIPPED | OUTPATIENT
Start: 2020-05-28 | End: 2020-11-11

## 2020-11-02 ENCOUNTER — TELEPHONE (OUTPATIENT)
Dept: FAMILY MEDICINE | Facility: CLINIC | Age: 40
End: 2020-11-02

## 2020-11-02 DIAGNOSIS — F33.0 MAJOR DEPRESSIVE DISORDER, RECURRENT EPISODE, MILD (H): ICD-10-CM

## 2020-11-02 DIAGNOSIS — F41.9 ANXIETY: ICD-10-CM

## 2020-11-03 NOTE — TELEPHONE ENCOUNTER
"Routing refill request to provider for review/approval because:  Failed protocol.  No future appointment scheduled. Please advise. Thank you. Rianna Jane R.N.  Requested Prescriptions   Pending Prescriptions Disp Refills    PARoxetine (PAXIL-CR) 25 MG 24 hr tablet [Pharmacy Med Name: PAROXETINE ER 25MG TABLETS] 180 tablet 1     Sig: TAKE 2 TABLETS BY MOUTH EVERY MORNING       SSRIs Protocol Failed - 11/2/2020 11:35 PM        Failed - PHQ-9 score less than 5 in past 6 months     Please review last PHQ-9 score.           Failed - Recent (6 mo) or future (30 days) visit within the authorizing provider's specialty     Patient had office visit in the last 6 months or has a visit in the next 30 days with authorizing provider or within the authorizing provider's specialty.  See \"Patient Info\" tab in inbasket, or \"Choose Columns\" in Meds & Orders section of the refill encounter.            Passed - Medication is active on med list        Passed - Patient is age 18 or older        Passed - No active pregnancy on record        Passed - No positive pregnancy test in last 12 months                   "

## 2020-11-03 NOTE — TELEPHONE ENCOUNTER
Needs to schedule diabetes visit so we can update labs and phq9.  Pt sent questionnaire on 10/5/2020, no response

## 2020-11-04 RX ORDER — PAROXETINE HYDROCHLORIDE HEMIHYDRATE 25 MG/1
TABLET, FILM COATED, EXTENDED RELEASE ORAL
Qty: 60 TABLET | Refills: 0 | Status: SHIPPED | OUTPATIENT
Start: 2020-11-04 | End: 2020-11-11

## 2020-11-04 NOTE — TELEPHONE ENCOUNTER
Prescription approved per [facility] Refill Protocol #30  Need to keep upcoming appointment for further refills  Trini Palomino RN

## 2020-11-04 NOTE — PROGRESS NOTES
Subjective     Radha Wilson is a 40 year old female who presents to clinic today for the following health issues:    History of Present Illness       Diabetes:   She presents for follow up of diabetes.  She is checking home blood glucose a few times a month. She checks blood glucose at bedtime.  Blood glucose is sometimes over 200 and never under 70. She is aware of hypoglycemia symptoms including shakiness. She has no concerns regarding her diabetes at this time.  She is not experiencing numbness or burning in feet, excessive thirst, blurry vision, weight changes or redness, sores or blisters on feet. The patient has had a diabetic eye exam in the last 12 months. Eye exam performed on [place]. Location of last [facility] exam [facility]        She eats 0-1 servings of fruits and vegetables daily.She consumes 1 sweetened beverage(s) daily.She exercises with enough effort to increase her heart rate 9 or less minutes per day.  She exercises with enough effort to increase her heart rate 3 or less days per week.   She is taking medications regularly.        Hypertension ROS: taking medications as instructed, no medication side effects noted, no TIA's, noting some chest pains described as tightness across upper chest, no radiation, can come on at rest or with exertion,   notes positional sob which is unchanged from previous and seems more notable when leaning forward, able to exercise and does not feel uncomfortable with shortness of breath   no swelling of ankles.  No headache or visual changes.    Not checking glucose regularly.  Out of strips      MARK-7 SCORE 1/21/2019 2/24/2020 11/11/2020   Total Score - - -   Total Score 1 (minimal anxiety) - -   Total Score 1 0 0        PHQ 7/12/2019 2/24/2020 11/11/2020   PHQ-9 Total Score 6 2 4   Q9: Thoughts of better off dead/self-harm past 2 weeks Not at all Not at all Not at all         BP Readings from Last 3 Encounters:   11/11/20 136/86   02/24/20 139/84   11/18/19 133/82     Wt Readings from Last 3 Encounters:   11/11/20 83.5 kg (184 lb)   02/24/20 82.9 kg (182 lb 12.8 oz)   09/24/19 83.6 kg (184 lb 3.2 oz)                  Patient Active Problem List   Diagnosis     CARDIOVASCULAR SCREENING; LDL GOAL LESS THAN 160     Mild major depression (H)     Anxiety     Advanced directives, counseling/discussion     Myopia     Controlled type 2 diabetes mellitus without complication, without long-term current use of insulin (H)     Past Surgical History:   Procedure Laterality Date     ABDOMEN SURGERY       APPENDECTOMY  5/19/12    [facility]     EXAM OF VAGINA,COLPOSCOPY  9/20/06    Benign     HC TOOTH EXTRACTION W/FORCEP       TONSILLECTOMY & ADENOIDECTOMY         Social History     Tobacco Use     Smoking status: Never Smoker     Smokeless tobacco: Never Used     Tobacco comment: Nonsmoking household   Substance Use Topics     Alcohol use: No     Comment: Minimal     Family History   Problem Relation Age of Onset     Cancer Mother         lymphoma     Other Cancer Mother         1994-current     Lipids Father      Hyperlipidemia Father      Bladder Cancer Father      Coronary Artery Disease Father         Cause of death     Other Cancer Father         Bladder     Heart Disease Maternal Grandfather         70s     Cancer - colorectal Paternal Grandmother         80s     Glaucoma Paternal Grandmother      Anesthesia Reaction Other         lung problems during appendectomy         Current Outpatient Medications   Medication Sig Dispense Refill     blood glucose (NO BRAND SPECIFIED) test strip Use to test blood sugar 2 times daily or as directed. To accompany: Blood Glucose Monitor Brands: per insurance. 100 strip 6     IRON PO Take by mouth daily       levonorgestrel-ethinyl estradiol (QUASENSE) 0.15-0.03 MG tablet TAKE 1 TABLET BY MOUTH DAILY AS DIRECTED. TAKE IT CONTINUOUSLY 112 tablet 3     losartan (COZAAR) 25 MG tablet TAKE 1 TABLET(25 MG) BY MOUTH DAILY 90 tablet 1     metFORMIN  (GLUCOPHAGE-XR) 500 MG 24 hr tablet Take 2 tablets (1,000 mg) by mouth 2 times daily (with meals) 360 tablet 1     Multiple Vitamins-Minerals (MULTIVITAMIN PO) Take 1 tablet by mouth daily       PARoxetine (PAXIL-CR) 25 MG 24 hr tablet TAKE 2 TABLETS BY MOUTH EVERY MORNING 180 tablet 1     STATIN NOT PRESCRIBED, INTENTIONAL, Please choose reason not prescribed, below       thin (NO BRAND SPECIFIED) lancets Use with lanceting device. To accompany: Blood Glucose Monitor Brands: per insurance. 200 each 6     ACE/ARB NOT PRESCRIBED, INTENTIONAL, Please choose reason not prescribed, below       alcohol swab prep pads Use to swab area of injection/mike as directed. 100 each 3     blood glucose calibration (NO BRAND SPECIFIED) solution To accompany: Blood Glucose Monitor Brands: per insurance. 1 Bottle 3     blood glucose monitoring (NO BRAND SPECIFIED) meter device kit Use to test blood sugar 2 times daily or as directed. Preferred blood glucose meter OR supplies to accompany: Blood Glucose Monitor Brands: per insurance. 1 kit 0     Recent Labs   Lab Test 11/11/20  0958 02/24/20  1032 08/26/19  0730 02/12/19  0915 02/12/19  0915 07/21/17  1014 07/21/17  1014   A1C 8.3* 7.6* 7.2*   < > 8.5*   < >  --    * 109*  --   --  131*  --  128  Test canceled after completion  CORRECTED ON 07/24 AT 1113: PREVIOUSLY REPORTED  Above desirable:  100 129   mg/dl Borderline High:  130 159 mg/dL High:             160 189 mg/dL Very   high:       >189 mg/dl     HDL 49* 58  --   --  47*  --  46*  Test canceled after completion  CORRECTED ON 07/24 AT 1113: PREVIOUSLY REPORTED AS 46     TRIG 328* 247*  --   --  268*  --  319*  Test canceled after completion  CORRECTED ON 07/24 AT 1113: PREVIOUSLY REPORTED  Borderline high:  150 199   mg/dl High:             200 499 mg/dl Very high:       >499 mg/dl     CR 0.71 0.71  --   --  0.68   < >  --    GFRESTIMATED >90 >90  --   --  >90   < >  --    GFRESTBLACK >90 >90  --   --   >90   < >  --    POTASSIUM 4.2 4.2  --   --  4.2   < >  --    TSH  --   --  1.73  --   --   --  2.35    < > = values in this interval not displayed.         Review of Systems   Constitutional, HEENT, cardiovascular, pulmonary, gi and gu systems are negative, except as otherwise noted.      Objective    /86   Pulse 81   Temp 97.2  F (36.2  C) (Tympanic)   Resp 20   Wt 83.5 kg (184 lb)   SpO2 98%   BMI 32.59 kg/m    Body mass index is 32.59 kg/m .  Physical Exam   GENERAL: healthy, alert and no distress  EYES: Eyes grossly normal to inspection, PERRL and conjunctivae and sclerae normal  RESP: lungs clear to auscultation - no rales, rhonchi or wheezes  CV: regular rate and rhythm, normal S1 S2, no S3 or S4, no murmur, click or rub, no peripheral edema and peripheral pulses strong  MS: no gross musculoskeletal defects noted, no edema  SKIN: no suspicious lesions or rashes  PSYCH: mentation appears normal, affect normal/bright              Foot Exam: Areas of numbess as outlined above  Right Foot none  Left Foot none  normal DP and PT pulses, no trophic changes or ulcerative lesions, normal sensory exam and normal monofilament exam             Assessment & Plan     (E11.9) Controlled type 2 diabetes mellitus without complication, without long-term current use of insulin (H)  (primary encounter diagnosis)  Comment: a1c rising, now above 8 due to inactivity and poor nutrition choices  Plan: HEMOGLOBIN A1C, FOOT EXAM, Basic metabolic         panel  (Ca, Cl, CO2, Creat, Gluc, K, Na, BUN),         metFORMIN (GLUCOPHAGE-XR) 500 MG 24 hr tablet,         blood glucose (NO BRAND SPECIFIED) test strip        Increase metformin to max dose of 1000 mg twice daily  Recheck a1c in 3 months, check glucose daily qam fasting   Refill x 6 months f/u 3 months for labs, f/u 6 months for OV and labs preventive     (F33.0) Major depressive disorder, recurrent episode, mild (H)  (F41.9) Anxiety  Comment: well controlled  Plan:  "PARoxetine (PAXIL-CR) 25 MG 24 hr tablet         Refill x 6 months     (E78.1) Hypertriglyceridemia  Comment: due for recheck  Plan: Lipid panel reflex to direct LDL Non-fasting        Adjust eating to improve control    (Z23) Need for vaccination  Comment: due  Plan: PPV23        BMI:   Estimated body mass index is 32.59 kg/m  as calculated from the following:    Height as of 2/24/20: 1.6 m (5' 3\").    Weight as of this encounter: 83.5 kg (184 lb).   Weight management plan: Discussed healthy diet and exercise guidelines         Patient Instructions     Increase metformin to 4 tabs daily.      Check glucose each morning, if not trending to lower than 150 after on new dose x 4 weeks, notify Katy Moore MD       Recheck a1c in 3 months. Lab only.       Return in about 6 months (around 5/11/2021) for Preventive exam, Fasting Lab Work.    Katy Moore MD  [facility]    "

## 2020-11-04 NOTE — TELEPHONE ENCOUNTER
Patient called back. Appointment made for next week. Patient does need a refill on the paxil to get her to that appointment. RN, can you please send refill. Thank you.Sima Ramirez MA/EFRA

## 2020-11-11 ENCOUNTER — OFFICE VISIT (OUTPATIENT)
Dept: FAMILY MEDICINE | Facility: CLINIC | Age: 40
End: 2020-11-11
Payer: COMMERCIAL

## 2020-11-11 VITALS
RESPIRATION RATE: 20 BRPM | BODY MASS INDEX: 32.59 KG/M2 | SYSTOLIC BLOOD PRESSURE: 136 MMHG | DIASTOLIC BLOOD PRESSURE: 86 MMHG | OXYGEN SATURATION: 98 % | WEIGHT: 184 LBS | TEMPERATURE: 97.2 F | HEART RATE: 81 BPM

## 2020-11-11 DIAGNOSIS — E78.1 HYPERTRIGLYCERIDEMIA: ICD-10-CM

## 2020-11-11 DIAGNOSIS — F33.0 MAJOR DEPRESSIVE DISORDER, RECURRENT EPISODE, MILD (H): ICD-10-CM

## 2020-11-11 DIAGNOSIS — Z23 NEED FOR VACCINATION: ICD-10-CM

## 2020-11-11 DIAGNOSIS — F41.9 ANXIETY: ICD-10-CM

## 2020-11-11 DIAGNOSIS — E11.9 CONTROLLED TYPE 2 DIABETES MELLITUS WITHOUT COMPLICATION, WITHOUT LONG-TERM CURRENT USE OF INSULIN (H): Primary | ICD-10-CM

## 2020-11-11 LAB
ANION GAP SERPL CALCULATED.3IONS-SCNC: 6 MMOL/L (ref 3–14)
BUN SERPL-MCNC: 9 MG/DL (ref 7–30)
CALCIUM SERPL-MCNC: 8.8 MG/DL (ref 8.5–10.1)
CHLORIDE SERPL-SCNC: 103 MMOL/L (ref 94–109)
CHOLEST SERPL-MCNC: 247 MG/DL
CO2 SERPL-SCNC: 26 MMOL/L (ref 20–32)
CREAT SERPL-MCNC: 0.71 MG/DL (ref 0.52–1.04)
GFR SERPL CREATININE-BSD FRML MDRD: >90 ML/MIN/{1.73_M2}
GLUCOSE SERPL-MCNC: 186 MG/DL (ref 70–99)
HBA1C MFR BLD: 8.3 % (ref 0–5.6)
HDLC SERPL-MCNC: 49 MG/DL
LDLC SERPL CALC-MCNC: 132 MG/DL
NONHDLC SERPL-MCNC: 198 MG/DL
POTASSIUM SERPL-SCNC: 4.2 MMOL/L (ref 3.4–5.3)
SODIUM SERPL-SCNC: 135 MMOL/L (ref 133–144)
TRIGL SERPL-MCNC: 328 MG/DL

## 2020-11-11 PROCEDURE — 96127 BRIEF EMOTIONAL/BEHAV ASSMT: CPT | Performed by: FAMILY MEDICINE

## 2020-11-11 PROCEDURE — 90472 IMMUNIZATION ADMIN EACH ADD: CPT | Performed by: FAMILY MEDICINE

## 2020-11-11 PROCEDURE — 80048 BASIC METABOLIC PNL TOTAL CA: CPT | Performed by: FAMILY MEDICINE

## 2020-11-11 PROCEDURE — 90636 HEP A/HEP B VACC ADULT IM: CPT | Performed by: FAMILY MEDICINE

## 2020-11-11 PROCEDURE — 80061 LIPID PANEL: CPT | Performed by: FAMILY MEDICINE

## 2020-11-11 PROCEDURE — 99207 PR FOOT EXAM NO CHARGE: CPT | Performed by: FAMILY MEDICINE

## 2020-11-11 PROCEDURE — 83036 HEMOGLOBIN GLYCOSYLATED A1C: CPT | Performed by: FAMILY MEDICINE

## 2020-11-11 PROCEDURE — 36415 COLL VENOUS BLD VENIPUNCTURE: CPT | Performed by: FAMILY MEDICINE

## 2020-11-11 PROCEDURE — 90732 PPSV23 VACC 2 YRS+ SUBQ/IM: CPT | Performed by: FAMILY MEDICINE

## 2020-11-11 PROCEDURE — 99214 OFFICE O/P EST MOD 30 MIN: CPT | Mod: 25 | Performed by: FAMILY MEDICINE

## 2020-11-11 PROCEDURE — 90471 IMMUNIZATION ADMIN: CPT | Performed by: FAMILY MEDICINE

## 2020-11-11 RX ORDER — PAROXETINE HYDROCHLORIDE HEMIHYDRATE 25 MG/1
TABLET, FILM COATED, EXTENDED RELEASE ORAL
Qty: 180 TABLET | Refills: 1 | Status: SHIPPED | OUTPATIENT
Start: 2020-11-11 | End: 2021-05-29

## 2020-11-11 RX ORDER — METFORMIN HCL 500 MG
1000 TABLET, EXTENDED RELEASE 24 HR ORAL 2 TIMES DAILY WITH MEALS
Qty: 360 TABLET | Refills: 1 | Status: SHIPPED | OUTPATIENT
Start: 2020-11-11 | End: 2021-06-15

## 2020-11-11 ASSESSMENT — ANXIETY QUESTIONNAIRES
2. NOT BEING ABLE TO STOP OR CONTROL WORRYING: NOT AT ALL
3. WORRYING TOO MUCH ABOUT DIFFERENT THINGS: NOT AT ALL
5. BEING SO RESTLESS THAT IT IS HARD TO SIT STILL: NOT AT ALL
7. FEELING AFRAID AS IF SOMETHING AWFUL MIGHT HAPPEN: NOT AT ALL
GAD7 TOTAL SCORE: 0
6. BECOMING EASILY ANNOYED OR IRRITABLE: NOT AT ALL
1. FEELING NERVOUS, ANXIOUS, OR ON EDGE: NOT AT ALL
IF YOU CHECKED OFF ANY PROBLEMS ON THIS QUESTIONNAIRE, HOW DIFFICULT HAVE THESE PROBLEMS MADE IT FOR YOU TO DO YOUR WORK, TAKE CARE OF THINGS AT HOME, OR GET ALONG WITH OTHER PEOPLE: NOT DIFFICULT AT ALL

## 2020-11-11 ASSESSMENT — PATIENT HEALTH QUESTIONNAIRE - PHQ9
5. POOR APPETITE OR OVEREATING: NOT AT ALL
SUM OF ALL RESPONSES TO PHQ QUESTIONS 1-9: 4

## 2020-11-11 NOTE — NURSING NOTE
"Chief Complaint   Patient presents with     Diabetes     Hypertension     Health Maintenance     phq-9       Initial /86   Pulse 81   Temp 97.2  F (36.2  C) (Tympanic)   Resp 20   Wt 83.5 kg (184 lb)   SpO2 98%   BMI 32.59 kg/m   Estimated body mass index is 32.59 kg/m  as calculated from the following:    Height as of 2/24/20: 1.6 m (5' 3\").    Weight as of this encounter: 83.5 kg (184 lb).  Medication Reconciliation: complete  Harris Cheung CMA    "

## 2020-11-11 NOTE — NURSING NOTE
Prior to injection verified patient identity using patient's name and date of birth.    Patient instructed to wait in clinic for 20 minutes following injection and to notify staff of any adverse reactions immediately.     Screening Questionnaire for Adult Immunization     Are you sick today?   No   Do you have allergies to medications, food or any vaccine?   Yes   Have you ever had a serious reaction after receiving a vaccination?   No   Do you have a long-term health problem with heart disease, lung disease,  asthma, kidney disease, diabetes, anemia, metabolic or blood disease?   Yes   Do you have cancer, leukemia, AIDS, or any immune system problem?   No   Do you take cortisone, prednisone, other steroids, or anticancer drugs, or  have you had any x-ray (radiation) treatments?   No   Have you had a seizure, brain, or other nervous system problem?   No   During the past year, have you received a transfusion of blood or blood       products, or been given a medicine called immune (gamma) globulin?   No   For women: Are you pregnant or is there a chance you could become         pregnant during the next month?   No   Have you received any vaccinations in the past 4 weeks?   Yes- flu shot    Immunization questionnaire was positive for at least one answer.  Notified Dr. Moore .      [facility] doesn't apply on this patient  Harris Cheung, [facility]

## 2020-11-11 NOTE — PATIENT INSTRUCTIONS
Increase metformin to 4 tabs daily.      Check glucose each morning, if not trending to lower than 150 after on new dose x 4 weeks, notify Katy Moore MD       Recheck a1c in 3 months. Lab only.

## 2020-11-12 ASSESSMENT — ANXIETY QUESTIONNAIRES: GAD7 TOTAL SCORE: 0

## 2021-01-13 ENCOUNTER — ALLIED HEALTH/NURSE VISIT (OUTPATIENT)
Dept: NURSING | Facility: CLINIC | Age: 41
End: 2021-01-13
Payer: COMMERCIAL

## 2021-01-13 DIAGNOSIS — Z23 NEED FOR VACCINATION: Primary | ICD-10-CM

## 2021-01-13 PROCEDURE — 90636 HEP A/HEP B VACC ADULT IM: CPT

## 2021-01-13 PROCEDURE — 90471 IMMUNIZATION ADMIN: CPT

## 2021-02-04 ENCOUNTER — TELEPHONE (OUTPATIENT)
Dept: FAMILY MEDICINE | Facility: CLINIC | Age: 41
End: 2021-02-04

## 2021-02-04 NOTE — TELEPHONE ENCOUNTER
Form to be completed for insurance placed in providers basket. Route back to team when done.  EFRA Toth

## 2021-02-05 NOTE — TELEPHONE ENCOUNTER
Forms completed and faxed to [employer] [facility], copy sent to chart.   [phone]  EFRA Toth

## 2021-02-11 ENCOUNTER — DOCUMENTATION ONLY (OUTPATIENT)
Dept: FAMILY MEDICINE | Facility: CLINIC | Age: 41
End: 2021-02-11

## 2021-02-11 DIAGNOSIS — E11.9 CONTROLLED TYPE 2 DIABETES MELLITUS WITHOUT COMPLICATION, WITHOUT LONG-TERM CURRENT USE OF INSULIN (H): Primary | ICD-10-CM

## 2021-02-12 DIAGNOSIS — E11.9 CONTROLLED TYPE 2 DIABETES MELLITUS WITHOUT COMPLICATION, WITHOUT LONG-TERM CURRENT USE OF INSULIN (H): ICD-10-CM

## 2021-02-12 LAB
ANION GAP SERPL CALCULATED.3IONS-SCNC: 10 MMOL/L (ref 3–14)
BUN SERPL-MCNC: 10 MG/DL (ref 7–30)
CALCIUM SERPL-MCNC: 9 MG/DL (ref 8.5–10.1)
CHLORIDE SERPL-SCNC: 102 MMOL/L (ref 94–109)
CO2 SERPL-SCNC: 22 MMOL/L (ref 20–32)
CREAT SERPL-MCNC: 0.67 MG/DL (ref 0.52–1.04)
CREAT UR-MCNC: 60 MG/DL
GFR SERPL CREATININE-BSD FRML MDRD: >90 ML/MIN/{1.73_M2}
GLUCOSE SERPL-MCNC: 308 MG/DL (ref 70–99)
HBA1C MFR BLD: 8.8 % (ref 0–5.6)
MICROALBUMIN UR-MCNC: 20 MG/L
MICROALBUMIN/CREAT UR: 33.5 MG/G CR (ref 0–25)
POTASSIUM SERPL-SCNC: 4.1 MMOL/L (ref 3.4–5.3)
SODIUM SERPL-SCNC: 134 MMOL/L (ref 133–144)

## 2021-02-12 PROCEDURE — 80048 BASIC METABOLIC PNL TOTAL CA: CPT | Performed by: FAMILY MEDICINE

## 2021-02-12 PROCEDURE — 36415 COLL VENOUS BLD VENIPUNCTURE: CPT | Performed by: FAMILY MEDICINE

## 2021-02-12 PROCEDURE — 83036 HEMOGLOBIN GLYCOSYLATED A1C: CPT | Performed by: FAMILY MEDICINE

## 2021-02-12 PROCEDURE — 82043 UR ALBUMIN QUANTITATIVE: CPT | Performed by: FAMILY MEDICINE

## 2021-02-18 ENCOUNTER — TELEPHONE (OUTPATIENT)
Dept: FAMILY MEDICINE | Facility: CLINIC | Age: 41
End: 2021-02-18

## 2021-02-18 NOTE — TELEPHONE ENCOUNTER
Appointment was scheduled today for     Next 5 appointments (look out 90 days)    Mar 02, 2021 10:55 AM  PHYSICAL with Katy Moore MD  [facility] ([facility] ) [address] [facility] [id number]  [phone]        Olga SHAHN, RN

## 2021-02-18 NOTE — TELEPHONE ENCOUNTER
Reason for Call:  Other appointment    Detailed comments: Patient mycharted and is requesting to get fit to get in for a physical. Please advise thank you    Phone Number Patient can be reached at: Home number on file [phone] (home)    Best Time: anytime    Can we leave a detailed message on this number? YES    Call taken on 2/18/2021 at 11:19 AM by Bridgett Tripp

## 2021-02-19 DIAGNOSIS — Z00.00 ROUTINE GENERAL MEDICAL EXAMINATION AT A HEALTH CARE FACILITY: ICD-10-CM

## 2021-02-19 RX ORDER — LEVONORGESTREL AND ETHINYL ESTRADIOL 0.15-0.03
KIT ORAL
Qty: 112 TABLET | Refills: 0 | Status: SHIPPED | OUTPATIENT
Start: 2021-02-19 | End: 2021-03-02

## 2021-02-19 NOTE — TELEPHONE ENCOUNTER
Prescription approved per [facility] Refill Protocol.  APPT NEEDED FOR FURTHER REFILLS  Laila refill given per RN protocol.   Due for office visit  Pharmacy note to inform pt of office visit needed for continued medication use  Trini Palomino RN

## 2021-02-22 NOTE — PATIENT INSTRUCTIONS
Preventive Health Recommendations  Female Ages 40 to 49    Yearly exam:     See your health care provider every year in order to  1. Review health changes.   2. Discuss preventive care.    3. Review your medicines if your doctor prescribed any.      Get a Pap test every three years (unless you have an abnormal result and your provider advises testing more often).      If you get Pap tests with HPV test, you only need to test every 5 years, unless you have an abnormal result. You do not need a Pap test if your uterus was removed (hysterectomy) and you have not had cancer.      You should be tested each year for STDs (sexually transmitted diseases), if you're at risk.     Ask your doctor if you should have a mammogram.      Have a colonoscopy (test for colon cancer) if someone in your family has had colon cancer or polyps before age 50.       Have a cholesterol test every 5 years.       Have a diabetes test (fasting glucose) after age 45. If you are at risk for diabetes, you should have this test every 3 years.    Shots: Get a flu shot each year. Get a tetanus shot every 10 years.     Nutrition:     Eat at least 5 servings of fruits and vegetables each day.    Eat whole-grain bread, whole-wheat pasta and brown rice instead of white grains and rice.    Get adequate Calcium and Vitamin D.      Lifestyle    Exercise at least 150 minutes a week (an average of 30 minutes a day, 5 days a week). This will help you control your weight and prevent disease.    Limit alcohol to one drink per day.    No smoking.     Wear sunscreen to prevent skin cancer.    See your dentist every six months for an exam and cleaning.    Schedule eye exam    Start trulicity, weekly. Send in glucose readings after 2 weeks on Trulicity (readings after second injection) so we can adjust dose if needed.      Increase losartan to 50 mg daily, recheck blood pressure at pharmacy in 2 weeks.

## 2021-02-22 NOTE — PROGRESS NOTES
SUBJECTIVE:   CC: Radha Wilson is an 41 year old woman who presents for preventive health visit.       Patient has been advised of split billing requirements and indicates understanding: Yes  Healthy Habits:     Getting at least 3 servings of Calcium per day:  Yes    Bi-annual eye exam:  Yes    Dental care twice a year:  Yes    Sleep apnea or symptoms of sleep apnea:  None    Diet:  Diabetic    Frequency of exercise:  1 day/week    Duration of exercise:  Less than 15 minutes    Taking medications regularly:  Yes    Medication side effects:  Not applicable    PHQ-2 Total Score: 1    Additional concerns today:  No    Reported fasting blood glucose around 161-180.  Interested in diabetic education. Making changes with diet. Increasing physical activity, signed up for [employer]. Walks 3 miles every other day with her dog.     Says that she will schedule for eye exam this year.    Today's PHQ-2 Score:   PHQ-2 ( 1999 [employer]) 2/27/2021   Q1: Little interest or pleasure in doing things 0   Q2: Feeling down, depressed or hopeless 1   PHQ-2 Score 1   Q1: Little interest or pleasure in doing things Not at all   Q2: Feeling down, depressed or hopeless Several days   PHQ-2 Score 1       Abuse: Current or Past (Physical, Sexual or Emotional) - No  Do you feel safe in your environment? Yes    Have you ever done Advance Care Planning? (For example, a Health Directive, POLST, or a discussion with a medical provider or your loved ones about your wishes): No, advance care planning information given to patient to review.  Patient plans to discuss their wishes with loved ones or provider.      Social History     Tobacco Use     Smoking status: Never Smoker     Smokeless tobacco: Never Used     Tobacco comment: Nonsmoking household   Substance Use Topics     Alcohol use: No     Comment: Minimal         Alcohol Use 2/27/2021   Prescreen: >3 drinks/day or >7 drinks/week? Not Applicable   Prescreen: >3 drinks/day or >7  drinks/week? -       Any new diagnosis of family breast, ovarian, or bowel cancer? No     Reviewed orders with patient.  Reviewed health maintenance and updated orders accordingly - Yes    G 0 P 0   Patient's last menstrual period was 12/01/2020.     Fasting: No   Td: tdap 2/20       Last 3 Pap and HPV Results:   PAP / HPV Latest Ref Rng & Units 2/24/2020 1/6/2015 12/16/2011   PAP - NIL NIL NIL   HPV 16 DNA NEG:Negative Negative - -   HPV 18 DNA NEG:Negative Negative - -   OTHER HR HPV NEG:Negative Negative - -                  Cholesterol:   Lab Results   Component Value Date    CHOL 247 11/11/2020     Lab Results   Component Value Date    HDL 49 11/11/2020     Lab Results   Component Value Date     11/11/2020     Lab Results   Component Value Date    TRIG 328 11/11/2020     Lab Results   Component Value Date    CHOLHDLRATIO 4.2 11/14/2011         MMG: NA  Dexa:  NA     Flex/colo: NA      Seat Belt: Yes    Sunscreen use: Yes   Calcium Intake: Adeq   Health Care Directive: Yes   Sexually Active: Yes     Current contraception: oral contraceptives  History of abnormal Pap smear: Yes: see pmh  Family history of colon/breast/ovarian cancer: No  Regular self breast exam: Yes  History of abnormal mammogram: No      BP Readings from Last 3 Encounters:   03/02/21 (!) 142/88   11/11/20 136/86   02/24/20 139/84    Wt Readings from Last 3 Encounters:   03/02/21 82.9 kg (182 lb 12.8 oz)   11/11/20 83.5 kg (184 lb)   02/24/20 82.9 kg (182 lb 12.8 oz)                  Patient Active Problem List   Diagnosis     CARDIOVASCULAR SCREENING; LDL GOAL LESS THAN 160     Mild major depression (H)     Anxiety     Advanced directives, counseling/discussion     Myopia     Controlled type 2 diabetes mellitus without complication, without long-term current use of insulin (H)     Past Surgical History:   Procedure Laterality Date     ABDOMEN SURGERY       APPENDECTOMY  5/19/12    [facility]     EXAM OF VAGINA,COLPOSCOPY  9/20/06     Benign     HC TOOTH EXTRACTION W/FORCEP       TONSILLECTOMY & ADENOIDECTOMY         Social History     Tobacco Use     Smoking status: Never Smoker     Smokeless tobacco: Never Used     Tobacco comment: Nonsmoking household   Substance Use Topics     Alcohol use: No     Comment: Minimal     Family History   Problem Relation Age of Onset     Cancer Mother         lymphoma     Other Cancer Mother         1994-current     Lipids Father      Hyperlipidemia Father      Bladder Cancer Father      Coronary Artery Disease Father         Cause of death     Other Cancer Father         Bladder     Heart Disease Maternal Grandfather         70s     Cancer - colorectal Paternal Grandmother         80s     Glaucoma Paternal Grandmother      Anesthesia Reaction Other         lung problems during appendectomy         Current Outpatient Medications   Medication Sig Dispense Refill     alcohol swab prep pads Use to swab area of injection/mike as directed. 100 each 3     blood glucose (NO BRAND SPECIFIED) test strip Use to test blood sugar 2 times daily or as directed. To accompany: Blood Glucose Monitor Brands: per insurance. 100 strip 6     blood glucose calibration (NO BRAND SPECIFIED) solution To accompany: Blood Glucose Monitor Brands: per insurance. 1 Bottle 3     blood glucose monitoring (NO BRAND SPECIFIED) meter device kit Use to test blood sugar 2 times daily or as directed. Preferred blood glucose meter OR supplies to accompany: Blood Glucose Monitor Brands: per insurance. 1 kit 0     dulaglutide (TRULICITY) 0.75 MG/0.5ML pen Inject 0.75 mg Subcutaneous every 7 days 2 mL 0     IRON PO Take by mouth daily       levonorgestrel-ethinyl estradiol (QUASENSE) 0.15-0.03 MG tablet Skip week 4 of packs 1-3, then take week 4 of pack 4 112 tablet 3     losartan (COZAAR) 50 MG tablet Take 1 tablet (50 mg) by mouth daily 90 tablet 0     metFORMIN (GLUCOPHAGE-XR) 500 MG 24 hr tablet Take 2 tablets (1,000 mg) by mouth 2 times daily (with  meals) 360 tablet 1     Multiple Vitamins-Minerals (MULTIVITAMIN PO) Take 1 tablet by mouth daily       PARoxetine (PAXIL-CR) 25 MG 24 hr tablet TAKE 2 TABLETS BY MOUTH EVERY MORNING 180 tablet 1     STATIN NOT PRESCRIBED, INTENTIONAL, Please choose reason not prescribed, below       thin (NO BRAND SPECIFIED) lancets Use with lanceting device. To accompany: Blood Glucose Monitor Brands: per insurance. 200 each 6     Recent Labs   Lab Test 02/12/21  1354 11/11/20  0958 02/24/20  1032 08/26/19  0730 02/12/19  0915 02/12/19  0915 07/21/17  1014 07/21/17  1014   A1C 8.8* 8.3* 7.6* 7.2*   < > 8.5*   < >  --    LDL  --  132* 109*  --   --  131*  --  128  Test canceled after completion  CORRECTED ON 07/24 AT 1113: PREVIOUSLY REPORTED  Above desirable:  100 129   mg/dl Borderline High:  130 159 mg/dL High:             160 189 mg/dL Very   high:       >189 mg/dl     HDL  --  49* 58  --   --  47*  --  46*  Test canceled after completion  CORRECTED ON 07/24 AT 1113: PREVIOUSLY REPORTED AS 46     TRIG  --  328* 247*  --   --  268*  --  319*  Test canceled after completion  CORRECTED ON 07/24 AT 1113: PREVIOUSLY REPORTED  Borderline high:  150 199   mg/dl High:             200 499 mg/dl Very high:       >499 mg/dl     CR 0.67 0.71 0.71  --   --  0.68   < >  --    GFRESTIMATED >90 >90 >90  --   --  >90   < >  --    GFRESTBLACK >90 >90 >90  --   --  >90   < >  --    POTASSIUM 4.1 4.2 4.2  --   --  4.2   < >  --    TSH  --   --   --  1.73  --   --   --  2.35    < > = values in this interval not displayed.        Breast CA Risk Screening:  No flowsheet data found.      Mammogram Screening - Offered annual screening and updated Health Maintenance for mutual plan based on risk factor consideration    Pertinent mammograms are reviewed under the imaging tab.  Reviewed and updated as needed this visit by clinical staff  Tobacco  Allergies  Meds  Problems  Med Hx  Surg Hx  Fam Hx  Soc Hx          Reviewed and updated  "as needed this visit by Provider  Tobacco  Allergies  Meds  Problems  Med Hx  Surg Hx  Fam Hx          Review of Systems   Constitutional: Negative for chills and fever.   HENT: Negative for congestion, ear pain, hearing loss and sore throat.    Eyes: Negative for pain and visual disturbance.   Respiratory: Negative for cough and shortness of breath.    Cardiovascular: Negative for chest pain, palpitations and peripheral edema.   Gastrointestinal: Negative for abdominal pain, constipation, diarrhea, heartburn, hematochezia and nausea.   Breasts:  Negative for tenderness, breast mass and discharge.   Genitourinary: Negative for dysuria, frequency, genital sores, hematuria, pelvic pain, urgency, vaginal bleeding and vaginal discharge.   Musculoskeletal: Negative for arthralgias, joint swelling and myalgias.   Skin: Negative for rash.   Neurological: Negative for dizziness, weakness, headaches and paresthesias.   Psychiatric/Behavioral: Negative for mood changes. The patient is not nervous/anxious.         OBJECTIVE:   BP (!) 142/88   Pulse 96   Temp 97.6  F (36.4  C) (Oral)   Resp 20   Ht 1.613 m (5' 3.5\")   Wt 82.9 kg (182 lb 12.8 oz)   LMP 12/01/2020   BMI 31.87 kg/m    Physical Exam  GENERAL: healthy, alert and no distress  EYES: Eyes grossly normal to inspection, PERRL and conjunctivae and sclerae normal  HENT: ear canals and TM's normal, nose and mouth without ulcers or lesions  NECK: no adenopathy, no asymmetry, masses, or scars and thyroid normal to palpation  RESP: lungs clear to auscultation - no rales, rhonchi or wheezes  BREAST: normal without masses, tenderness or nipple discharge and no palpable axillary masses or adenopathy  CV: regular rate and rhythm, normal S1 S2, no S3 or S4, no murmur, click or rub, no peripheral edema and peripheral pulses strong  ABDOMEN: soft, nontender, no hepatosplenomegaly, no masses and bowel sounds normal  MS: no gross musculoskeletal defects noted, no " "edema  SKIN: no suspicious lesions or rashes  NEURO: Normal strength and tone, mentation intact and speech normal  PSYCH: mentation appears normal, affect normal/bright    Diabetic foot exam: normal DP and PT pulses, no trophic changes or ulcerative lesions and normal sensory exam     Diagnostic Test Results:  Labs reviewed in Epic    ASSESSMENT/PLAN:   (Z00.00) Routine general medical examination at a health care facility  (primary encounter diagnosis)  Comment: Preventative needs reviewed.   Plan: levonorgestrel-ethinyl estradiol (QUASENSE)         0.15-0.03 MG tablet        Refilled x1 yr     (E11.29,  E11.65,  R80.9) Uncontrolled type 2 diabetes mellitus with microalbuminuria, without long-term current use of insulin (H)  Comment:   Plan: dulaglutide (TRULICITY) 0.75 MG/0.5ML pen, Contact provider with fasting blood glucose 2 weeks after startingTrulicity.         AMBULATORY ADULT DIABETES EDUCATOR REFERRAL        Meet w/Diabetic educator        Increase physical activity     (I10) Hypertension goal BP (blood pressure) < 140/90  Comment: Not to goal.   Plan: losartan (COZAAR) 50 MG tablet       Refilled x30 days. Return in 2 weeks for BP recheck.             Patient has been advised of split billing requirements and indicates understanding: Yes  COUNSELING:  Reviewed preventive health counseling, as reflected in patient instructions       Regular exercise       Healthy diet/nutrition       Vision screening       Advance Care Planning    Estimated body mass index is 31.87 kg/m  as calculated from the following:    Height as of this encounter: 1.613 m (5' 3.5\").    Weight as of this encounter: 82.9 kg (182 lb 12.8 oz).    Weight management plan: Discussed healthy diet and exercise guidelines    She reports that she has never smoked. She has never used smokeless tobacco.      Counseling Resources:  ATP IV Guidelines  Pooled Cohorts Equation Calculator  Breast Cancer Risk Calculator  BRCA-Related Cancer Risk " Assessment: FHS-7 Tool  FRAX Risk Assessment  ICSI Preventive Guidelines  Dietary Guidelines for Americans, 2010  USDA's MyPlate  ASA Prophylaxis  Lung CA Screening    Katy Moore MD  [facility]

## 2021-02-27 ASSESSMENT — ENCOUNTER SYMPTOMS
HEMATOCHEZIA: 0
SHORTNESS OF BREATH: 0
CONSTIPATION: 0
NERVOUS/ANXIOUS: 0
HEADACHES: 0
COUGH: 0
MYALGIAS: 0
HEARTBURN: 0
EYE PAIN: 0
CHILLS: 0
ABDOMINAL PAIN: 0
FEVER: 0
JOINT SWELLING: 0
DIZZINESS: 0
HEMATURIA: 0
WEAKNESS: 0
BREAST MASS: 0
ARTHRALGIAS: 0
FREQUENCY: 0
PALPITATIONS: 0
SORE THROAT: 0
PARESTHESIAS: 0
NAUSEA: 0
DIARRHEA: 0
DYSURIA: 0

## 2021-03-01 ASSESSMENT — ENCOUNTER SYMPTOMS
PALPITATIONS: 0
COUGH: 0
BREAST MASS: 0
SORE THROAT: 0
HEMATURIA: 0
WEAKNESS: 0
HEADACHES: 0
DIZZINESS: 0
MYALGIAS: 0
EYE PAIN: 0
DIARRHEA: 0
CONSTIPATION: 0
ABDOMINAL PAIN: 0
NERVOUS/ANXIOUS: 0
CHILLS: 0
JOINT SWELLING: 0
PARESTHESIAS: 0
ARTHRALGIAS: 0
FEVER: 0
DYSURIA: 0
FREQUENCY: 0
SHORTNESS OF BREATH: 0
NAUSEA: 0
HEMATOCHEZIA: 0
HEARTBURN: 0

## 2021-03-02 ENCOUNTER — OFFICE VISIT (OUTPATIENT)
Dept: FAMILY MEDICINE | Facility: CLINIC | Age: 41
End: 2021-03-02
Payer: COMMERCIAL

## 2021-03-02 VITALS
RESPIRATION RATE: 20 BRPM | WEIGHT: 182.8 LBS | HEART RATE: 96 BPM | TEMPERATURE: 97.6 F | HEIGHT: 64 IN | BODY MASS INDEX: 31.21 KG/M2 | DIASTOLIC BLOOD PRESSURE: 88 MMHG | SYSTOLIC BLOOD PRESSURE: 142 MMHG

## 2021-03-02 DIAGNOSIS — I10 HYPERTENSION GOAL BP (BLOOD PRESSURE) < 140/90: ICD-10-CM

## 2021-03-02 DIAGNOSIS — Z00.00 ROUTINE GENERAL MEDICAL EXAMINATION AT A HEALTH CARE FACILITY: Primary | ICD-10-CM

## 2021-03-02 PROCEDURE — 99396 PREV VISIT EST AGE 40-64: CPT | Performed by: FAMILY MEDICINE

## 2021-03-02 PROCEDURE — 99214 OFFICE O/P EST MOD 30 MIN: CPT | Mod: 25 | Performed by: FAMILY MEDICINE

## 2021-03-02 RX ORDER — LEVONORGESTREL AND ETHINYL ESTRADIOL 0.15-0.03
KIT ORAL
Qty: 112 TABLET | Refills: 3 | Status: SHIPPED | OUTPATIENT
Start: 2021-03-02 | End: 2022-03-03

## 2021-03-02 RX ORDER — LOSARTAN POTASSIUM 50 MG/1
50 TABLET ORAL DAILY
Qty: 90 TABLET | Refills: 0 | Status: SHIPPED | OUTPATIENT
Start: 2021-03-02 | End: 2021-06-15

## 2021-03-02 ASSESSMENT — MIFFLIN-ST. JEOR: SCORE: 1461.24

## 2021-03-03 ENCOUNTER — TELEPHONE (OUTPATIENT)
Dept: FAMILY MEDICINE | Facility: CLINIC | Age: 41
End: 2021-03-03

## 2021-03-03 NOTE — TELEPHONE ENCOUNTER
Diabetes Education Scheduling Outreach #1:    Call to patient to schedule. Left message with phone number to call to schedule.    Plan for 2nd outreach attempt within 1 week.    Lizet Antoine  [place] [facility]  Diabetes and Nutrition Scheduling

## 2021-03-17 ENCOUNTER — TELEPHONE (OUTPATIENT)
Dept: FAMILY MEDICINE | Facility: CLINIC | Age: 41
End: 2021-03-17

## 2021-03-17 NOTE — TELEPHONE ENCOUNTER
Patient Quality Outreach      Summary:    Patient has the following on her problem list/HM:     Depression / Dysthymia review    6 Month Remission: 4-8 month window range:   12 Month Remission: 10-14 month window range:        PHQ-9 SCORE 7/12/2019 2/24/2020 11/11/2020   PHQ-9 Total Score - - -   PHQ-9 Total Score MyChart 6 (Mild depression) - -   PHQ-9 Total Score 6 2 4       If PHQ-9 recheck is 5 or more, route to provider for next steps.    Diabetes    Last A1C:  Lab Results   Component Value Date    A1C 8.8 02/12/2021    A1C 8.3 11/11/2020       Last LDL:    Lab Results   Component Value Date     11/11/2020       Is the patient on a Statin? Excluded          Is the patient on Aspirin? No    Medications     HMG CoA Reductase Inhibitors     STATIN NOT PRESCRIBED, INTENTIONAL,             Last three blood pressure readings:  BP Readings from Last 3 Encounters:   03/02/21 (!) 142/88   11/11/20 136/86   02/24/20 139/84            Tobacco Use      Smoking status: Never Smoker      Smokeless tobacco: Never Used      Tobacco comment: Nonsmoking household          Patient is due/failing the following:   Eye Exam and BP check    Type of outreach:    Sent [email or web address] message.    Questions for provider review:    None                                                                                                                                     Harris Cheung CMA       Chart routed to closed.

## 2021-03-18 ENCOUNTER — ALLIED HEALTH/NURSE VISIT (OUTPATIENT)
Dept: FAMILY MEDICINE | Facility: CLINIC | Age: 41
End: 2021-03-18
Payer: COMMERCIAL

## 2021-03-18 ENCOUNTER — MYC MEDICAL ADVICE (OUTPATIENT)
Dept: FAMILY MEDICINE | Facility: CLINIC | Age: 41
End: 2021-03-18

## 2021-03-18 VITALS — DIASTOLIC BLOOD PRESSURE: 84 MMHG | SYSTOLIC BLOOD PRESSURE: 130 MMHG

## 2021-03-18 DIAGNOSIS — Z01.30 BP CHECK: Primary | ICD-10-CM

## 2021-03-18 DIAGNOSIS — E11.9 CONTROLLED TYPE 2 DIABETES MELLITUS WITHOUT COMPLICATION, WITHOUT LONG-TERM CURRENT USE OF INSULIN (H): Primary | ICD-10-CM

## 2021-03-18 PROCEDURE — 99207 PR NO CHARGE NURSE ONLY: CPT | Performed by: FAMILY MEDICINE

## 2021-03-18 NOTE — PROGRESS NOTES
Radha Wilson was evaluated at [facility] on March 18, 2021 at which time her blood pressure was:    BP Readings from Last 3 Encounters:   03/18/21 130/84   03/02/21 (!) 142/88   11/11/20 136/86     Pulse Readings from Last 3 Encounters:   03/02/21 96   11/11/20 81   02/24/20 85       Reviewed lifestyle modifications for blood pressure control and reduction: including making healthy food choices, managing weight, getting regular exercise, smoking cessation, reducing alcohol consumption, monitoring blood pressure regularly.     Symptoms: None    BP Goal:< 140/90 mmHg    BP Assessment:  BP at goal    Potential Reasons for BP too high: NA - Not applicable    BP Follow-Up Plan: Recheck BP in 6 months at pharmacy    Recommendation to Provider: Continue with current plan.    Note completed by: Thank you,  Radha Bragg, PharmD  [facility]  [phone]

## 2021-03-23 ENCOUNTER — IMMUNIZATION (OUTPATIENT)
Dept: NURSING | Facility: CLINIC | Age: 41
End: 2021-03-23
Payer: COMMERCIAL

## 2021-03-23 PROCEDURE — 91300 PR COVID VAC PFIZER DIL RECON 30 MCG/0.3 ML IM: CPT

## 2021-03-23 PROCEDURE — 0001A PR COVID VAC PFIZER DIL RECON 30 MCG/0.3 ML IM: CPT

## 2021-03-25 NOTE — TELEPHONE ENCOUNTER
Diabetes Education Scheduling Outreach #2:    Call to patient to schedule. Left message with phone number to call to schedule.    Lizet Antoine  [place] [facility]  Diabetes and Nutrition Scheduling

## 2021-03-31 ENCOUNTER — MYC MEDICAL ADVICE (OUTPATIENT)
Dept: FAMILY MEDICINE | Facility: CLINIC | Age: 41
End: 2021-03-31

## 2021-03-31 DIAGNOSIS — E11.9 CONTROLLED TYPE 2 DIABETES MELLITUS WITHOUT COMPLICATION, WITHOUT LONG-TERM CURRENT USE OF INSULIN (H): ICD-10-CM

## 2021-04-13 ENCOUNTER — IMMUNIZATION (OUTPATIENT)
Dept: NURSING | Facility: CLINIC | Age: 41
End: 2021-04-13
Attending: FAMILY MEDICINE
Payer: COMMERCIAL

## 2021-04-13 PROCEDURE — 91300 PR COVID VAC PFIZER DIL RECON 30 MCG/0.3 ML IM: CPT

## 2021-04-13 PROCEDURE — 0002A PR COVID VAC PFIZER DIL RECON 30 MCG/0.3 ML IM: CPT

## 2021-05-04 ENCOUNTER — MYC MEDICAL ADVICE (OUTPATIENT)
Dept: FAMILY MEDICINE | Facility: CLINIC | Age: 41
End: 2021-05-04

## 2021-05-29 DIAGNOSIS — F33.0 MAJOR DEPRESSIVE DISORDER, RECURRENT EPISODE, MILD (H): ICD-10-CM

## 2021-05-29 DIAGNOSIS — F41.9 ANXIETY: ICD-10-CM

## 2021-05-29 RX ORDER — PAROXETINE HYDROCHLORIDE HEMIHYDRATE 25 MG/1
TABLET, FILM COATED, EXTENDED RELEASE ORAL
Qty: 180 TABLET | Refills: 0 | Status: SHIPPED | OUTPATIENT
Start: 2021-05-29 | End: 2021-08-26

## 2021-05-29 NOTE — LETTER
June 1, 2021    Radha Wilson  [address] [place] [facility] [address]    Dear Radha,       We recently received a refill request for PARoxetine (PAXIL-CR) 25 MG 24 hr tablet.  We have refilled this for a one time 90 day supply only because you are due for a:     A1C lab appointment        Please call at your earliest convenience so that there will not be a delay with your future refills.        Thank you,   Your [facility] Team/sp  [phone]

## 2021-05-29 NOTE — TELEPHONE ENCOUNTER
Pt needs a1c now. Order in.     Also please inquire if pt has had diabetic eye exam within past 12 months and if so, when/where and any diabetic eye changes?      90 day supply of med sent.

## 2021-06-01 ENCOUNTER — MYC MEDICAL ADVICE (OUTPATIENT)
Dept: FAMILY MEDICINE | Facility: CLINIC | Age: 41
End: 2021-06-01

## 2021-06-07 ENCOUNTER — DOCUMENTATION ONLY (OUTPATIENT)
Dept: FAMILY MEDICINE | Facility: CLINIC | Age: 41
End: 2021-06-07

## 2021-06-07 DIAGNOSIS — E11.9 CONTROLLED TYPE 2 DIABETES MELLITUS WITHOUT COMPLICATION, WITHOUT LONG-TERM CURRENT USE OF INSULIN (H): Primary | ICD-10-CM

## 2021-06-09 ENCOUNTER — TRANSFERRED RECORDS (OUTPATIENT)
Dept: MULTI SPECIALTY CLINIC | Facility: CLINIC | Age: 41
End: 2021-06-09

## 2021-06-09 LAB
RETINOPATHY: NEGATIVE
RETINOPATHY: NEGATIVE

## 2021-06-11 ENCOUNTER — ALLIED HEALTH/NURSE VISIT (OUTPATIENT)
Dept: NURSING | Facility: CLINIC | Age: 41
End: 2021-06-11
Payer: COMMERCIAL

## 2021-06-11 VITALS — HEART RATE: 72 BPM | SYSTOLIC BLOOD PRESSURE: 118 MMHG | DIASTOLIC BLOOD PRESSURE: 64 MMHG

## 2021-06-11 DIAGNOSIS — E11.9 CONTROLLED TYPE 2 DIABETES MELLITUS WITHOUT COMPLICATION, WITHOUT LONG-TERM CURRENT USE OF INSULIN (H): ICD-10-CM

## 2021-06-11 DIAGNOSIS — Z23 NEED FOR PROPHYLACTIC VACCINATION AGAINST HEPATITIS A AND HEPATITIS B IN ADULT: Primary | ICD-10-CM

## 2021-06-11 LAB — HBA1C MFR BLD: 7.7 % (ref 0–5.6)

## 2021-06-11 PROCEDURE — 90636 HEP A/HEP B VACC ADULT IM: CPT

## 2021-06-11 PROCEDURE — 83036 HEMOGLOBIN GLYCOSYLATED A1C: CPT | Performed by: FAMILY MEDICINE

## 2021-06-11 PROCEDURE — 36415 COLL VENOUS BLD VENIPUNCTURE: CPT | Performed by: FAMILY MEDICINE

## 2021-06-11 PROCEDURE — 99207 PR NO CHARGE NURSE ONLY: CPT

## 2021-06-11 PROCEDURE — 90471 IMMUNIZATION ADMIN: CPT

## 2021-06-15 DIAGNOSIS — I10 HYPERTENSION GOAL BP (BLOOD PRESSURE) < 140/90: ICD-10-CM

## 2021-06-15 DIAGNOSIS — E11.9 CONTROLLED TYPE 2 DIABETES MELLITUS WITHOUT COMPLICATION, WITHOUT LONG-TERM CURRENT USE OF INSULIN (H): ICD-10-CM

## 2021-06-15 DIAGNOSIS — F41.9 ANXIETY: ICD-10-CM

## 2021-06-15 DIAGNOSIS — F33.0 MAJOR DEPRESSIVE DISORDER, RECURRENT EPISODE, MILD (H): ICD-10-CM

## 2021-06-15 RX ORDER — METFORMIN HCL 500 MG
TABLET, EXTENDED RELEASE 24 HR ORAL
Qty: 360 TABLET | Refills: 0 | Status: SHIPPED | OUTPATIENT
Start: 2021-06-15 | End: 2021-09-15

## 2021-06-15 RX ORDER — LOSARTAN POTASSIUM 50 MG/1
50 TABLET ORAL DAILY
Qty: 90 TABLET | Refills: 0 | Status: SHIPPED | OUTPATIENT
Start: 2021-06-15 | End: 2021-09-15

## 2021-08-23 ENCOUNTER — MYC MEDICAL ADVICE (OUTPATIENT)
Dept: FAMILY MEDICINE | Facility: CLINIC | Age: 41
End: 2021-08-23

## 2021-08-25 ASSESSMENT — PATIENT HEALTH QUESTIONNAIRE - PHQ9
SUM OF ALL RESPONSES TO PHQ QUESTIONS 1-9: 5
SUM OF ALL RESPONSES TO PHQ QUESTIONS 1-9: 5
10. IF YOU CHECKED OFF ANY PROBLEMS, HOW DIFFICULT HAVE THESE PROBLEMS MADE IT FOR YOU TO DO YOUR WORK, TAKE CARE OF THINGS AT HOME, OR GET ALONG WITH OTHER PEOPLE: NOT DIFFICULT AT ALL

## 2021-08-26 RX ORDER — PAROXETINE HYDROCHLORIDE HEMIHYDRATE 25 MG/1
TABLET, FILM COATED, EXTENDED RELEASE ORAL
Qty: 180 TABLET | Refills: 0 | Status: SHIPPED | OUTPATIENT
Start: 2021-08-26 | End: 2021-11-17

## 2021-08-26 ASSESSMENT — PATIENT HEALTH QUESTIONNAIRE - PHQ9: SUM OF ALL RESPONSES TO PHQ QUESTIONS 1-9: 5

## 2021-09-09 ENCOUNTER — DOCUMENTATION ONLY (OUTPATIENT)
Dept: LAB | Facility: CLINIC | Age: 41
End: 2021-09-09

## 2021-09-09 DIAGNOSIS — E11.9 CONTROLLED TYPE 2 DIABETES MELLITUS WITHOUT COMPLICATION, WITHOUT LONG-TERM CURRENT USE OF INSULIN (H): Primary | ICD-10-CM

## 2021-09-09 NOTE — PROGRESS NOTES
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   Recheck a1c in 3 months, f/u 6 months for OV and labs preventive exam/fasting labs    (I10) Hypertension goal BP (blood pressure) < 140/90  Comment: to goal  Plan: losartan (COZAAR) 50 MG tablet         Refill x 6 months     (F33.0) Major depressive disorder, recurrent episode, mild (H)  Comment: having sexual side effects from paxil, low libido  Plan: buPROPion (WELLBUTRIN) 75 MG tablet        Trial of wellbutrin at night.  Pt inquires about addyi for low libido, will gather information and notify pt at next visit.                BMI:   Estimated body mass index is 31.77 kg/m  as calculated from the following:    Height as of 3/2/21: 1.613 m (5' 3.5\").    Weight as of this encounter: 82.6 kg (182 lb 3.2 oz).   Weight management plan: Discussed healthy diet and exercise guidelines    Patient Instructions       Increase trulicity to 1.5mg weekly    Add wellbutrin for suspected side effects from Paroxetine.      Check sugar 3 times a week and vary when you check with at least one being fasting.          Return in about 3 months (around 12/15/2021) for Non-fasting Lab Work.    Katy Moore MD  [facility]    La Womack is a 41 year old who presents for the following health issues     History of Present Illness       Diabetes:   She presents for follow up of diabetes.  She is checking home blood glucose a few times a month. She checks blood glucose " before meals.  Blood glucose is never over 200 and never under 70. When her blood glucose is low, the patient is asymptomatic for confusion, blurred vision, lethargy and reports not feeling dizzy, shaky, or weak.  She has no concerns regarding her diabetes at this time.  She is not experiencing numbness or burning in feet, excessive thirst, blurry vision, weight changes or redness, sores or blisters on feet.         Hypertension: She presents for follow up of hypertension.  She does not check blood pressure  regularly outside of the clinic. Outpatient blood pressures have not been over 140/90. She follows a low salt diet.     She eats 0-1 servings of fruits and vegetables daily.She consumes 0 sweetened beverage(s) daily.She exercises with enough effort to increase her heart rate 9 or less minutes per day.  She exercises with enough effort to increase her heart rate 3 or less days per week.   She is taking medications regularly.     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  Checking irregularly, tolerating trulicity well  BP Readings from Last 3 Encounters:   09/15/21 133/80   06/11/21 118/64   03/18/21 130/84    Wt Readings from Last 3 Encounters:   09/15/21 82.6 kg (182 lb 3.2 oz)   03/02/21 82.9 kg (182 lb 12.8 oz)   11/11/20 83.5 kg (184 lb)                  Patient Active Problem List   Diagnosis     CARDIOVASCULAR SCREENING; LDL GOAL LESS THAN 160     Mild major depression (H)     Anxiety     Advanced directives, counseling/discussion     Myopia     Controlled type 2 diabetes mellitus without complication, without long-term current use of insulin (H)     Past Surgical History:   Procedure Laterality Date     ABDOMEN SURGERY       APPENDECTOMY  5/19/12    [facility]     EXAM OF VAGINA,COLPOSCOPY  9/20/06    Benign     HC TOOTH EXTRACTION W/FORCEP       TONSILLECTOMY & ADENOIDECTOMY         Social History     Tobacco Use     Smoking status: Never Smoker      Smokeless tobacco: Never Used     Tobacco comment: Nonsmoking household   Substance Use Topics     Alcohol use: No     Comment: Minimal     Family History   Problem Relation Age of Onset     Cancer Mother         lymphoma     Other Cancer Mother         1994-current     Lipids Father      Hyperlipidemia Father      Bladder Cancer Father      Coronary Artery Disease Father         Cause of death     Other Cancer Father         Bladder     Heart Disease Maternal Grandfather         70s     Cancer - colorectal Paternal Grandmother         80s     Glaucoma Paternal Grandmother      Anesthesia Reaction Other         lung problems during appendectomy         Current Outpatient Medications   Medication Sig Dispense Refill     buPROPion (WELLBUTRIN) 75 MG tablet Take 1 tablet (75 mg) by mouth At Bedtime 30 tablet 0     dulaglutide (TRULICITY) 1.5 MG/0.5ML pen Inject 1.5 mg Subcutaneous every 7 days 6 mL 1     IRON PO Take by mouth daily       levonorgestrel-ethinyl estradiol (QUASENSE) 0.15-0.03 MG tablet Skip week 4 of packs 1-3, then take week 4 of pack 4 112 tablet 3     losartan (COZAAR) 50 MG tablet Take 1 tablet (50 mg) by mouth daily 90 tablet 1     metFORMIN (GLUCOPHAGE-XR) 500 MG 24 hr tablet TAKE 2 TABLETS(1000 MG) BY MOUTH TWICE DAILY WITH MEALS 360 tablet 1     Multiple Vitamins-Minerals (MULTIVITAMIN PO) Take 1 tablet by mouth daily       PARoxetine (PAXIL-CR) 25 MG 24 hr tablet TAKE 2 TABLETS BY MOUTH EVERY MORNING 180 tablet 0     Recent Labs   Lab Test 09/14/21  1039 06/11/21  1031 02/12/21  1354 11/11/20  0958 11/11/20  0958 02/24/20  1032 02/24/20  1032 08/26/19  0730 02/12/19  0915 07/28/17  1053 07/21/17  1014   A1C 8.1* 7.7* 8.8*   < > 8.3*   < > 7.6* 7.2*   < >   < >  --      --   --   --  132*  --  109*  --    < >   < > 128  Test canceled after completion  CORRECTED ON 07/24 AT 1113: PREVIOUSLY REPORTED  Above desirable:  100 129   mg/dl Borderline High:  130 159 mg/dL High:              160 189 mg/dL Very   high:       >189 mg/dl     HDL 52  --   --   --  49*  --  58  --    < >   < > 46*  Test canceled after completion  CORRECTED ON 07/24 AT 1113: PREVIOUSLY REPORTED AS 46     TRIG 355*  --   --   --  328*  --  247*  --    < >   < > 319*  Test canceled after completion  CORRECTED ON 07/24 AT 1113: PREVIOUSLY REPORTED  Borderline high:  150 199   mg/dl High:             200 499 mg/dl Very high:       >499 mg/dl     CR 0.75  --  0.67   < > 0.71   < > 0.71  --    < >   < >  --    GFRESTIMATED >90  --  >90   < > >90   < > >90  --    < >   < >  --    GFRESTBLACK  --   --  >90  --  >90   < > >90  --    < >   < >  --    POTASSIUM 4.6  --  4.1   < > 4.2   < > 4.2  --    < >   < >  --    TSH  --   --   --   --   --   --   --  1.73  --   --  2.35    < > = values in this interval not displayed.           Review of Systems   Constitutional, HEENT, cardiovascular, pulmonary, gi and gu systems are negative, except as otherwise noted.      Objective    /80   Pulse 94   Temp 98.4  F (36.9  C) (Oral)   Resp 16   Wt 82.6 kg (182 lb 3.2 oz)   LMP 09/08/2021   BMI 31.77 kg/m    Body mass index is 31.77 kg/m .  Physical Exam   GENERAL: healthy, alert and no distress  EYES: Eyes grossly normal to inspection, PERRL and conjunctivae and sclerae normal  RESP: lungs clear to auscultation - no rales, rhonchi or wheezes  CV: regular rate and rhythm, normal S1 S2, no S3 or S4, no murmur, click or rub, no peripheral edema and peripheral pulses strong  MS: no gross musculoskeletal defects noted, no edema  SKIN: no suspicious lesions or rashes  PSYCH: mentation appears normal, affect normal/bright

## 2021-09-10 DIAGNOSIS — E11.9 CONTROLLED TYPE 2 DIABETES MELLITUS WITHOUT COMPLICATION, WITHOUT LONG-TERM CURRENT USE OF INSULIN (H): ICD-10-CM

## 2021-09-12 RX ORDER — METFORMIN HCL 500 MG
TABLET, EXTENDED RELEASE 24 HR ORAL
Qty: 360 TABLET | Refills: 0 | OUTPATIENT
Start: 2021-09-12

## 2021-09-14 ENCOUNTER — LAB (OUTPATIENT)
Dept: LAB | Facility: CLINIC | Age: 41
End: 2021-09-14
Payer: COMMERCIAL

## 2021-09-14 DIAGNOSIS — E11.9 CONTROLLED TYPE 2 DIABETES MELLITUS WITHOUT COMPLICATION, WITHOUT LONG-TERM CURRENT USE OF INSULIN (H): ICD-10-CM

## 2021-09-14 LAB
ANION GAP SERPL CALCULATED.3IONS-SCNC: 9 MMOL/L (ref 3–14)
BUN SERPL-MCNC: 13 MG/DL (ref 7–30)
CALCIUM SERPL-MCNC: 9.1 MG/DL (ref 8.5–10.1)
CHLORIDE BLD-SCNC: 104 MMOL/L (ref 94–109)
CHOLEST SERPL-MCNC: 223 MG/DL
CO2 SERPL-SCNC: 24 MMOL/L (ref 20–32)
CREAT SERPL-MCNC: 0.75 MG/DL (ref 0.52–1.04)
FASTING STATUS PATIENT QL REPORTED: YES
GFR SERPL CREATININE-BSD FRML MDRD: >90 ML/MIN/1.73M2
GLUCOSE BLD-MCNC: 159 MG/DL (ref 70–99)
HBA1C MFR BLD: 8.1 % (ref 0–5.6)
HDLC SERPL-MCNC: 52 MG/DL
LDLC SERPL CALC-MCNC: 100 MG/DL
NONHDLC SERPL-MCNC: 171 MG/DL
POTASSIUM BLD-SCNC: 4.6 MMOL/L (ref 3.4–5.3)
SODIUM SERPL-SCNC: 137 MMOL/L (ref 133–144)
TRIGL SERPL-MCNC: 355 MG/DL

## 2021-09-14 PROCEDURE — 36415 COLL VENOUS BLD VENIPUNCTURE: CPT

## 2021-09-14 PROCEDURE — 80048 BASIC METABOLIC PNL TOTAL CA: CPT

## 2021-09-14 PROCEDURE — 80061 LIPID PANEL: CPT

## 2021-09-14 PROCEDURE — 83036 HEMOGLOBIN GLYCOSYLATED A1C: CPT

## 2021-09-14 NOTE — RESULT ENCOUNTER NOTE
Radha,  Here are your lab results.  We will discuss these at your upcoming office or telephone visit.   See you soon.  Katy Moore MD

## 2021-09-15 ENCOUNTER — OFFICE VISIT (OUTPATIENT)
Dept: FAMILY MEDICINE | Facility: CLINIC | Age: 41
End: 2021-09-15
Payer: COMMERCIAL

## 2021-09-15 VITALS
TEMPERATURE: 98.4 F | BODY MASS INDEX: 31.77 KG/M2 | RESPIRATION RATE: 16 BRPM | WEIGHT: 182.2 LBS | SYSTOLIC BLOOD PRESSURE: 133 MMHG | DIASTOLIC BLOOD PRESSURE: 80 MMHG | HEART RATE: 94 BPM

## 2021-09-15 DIAGNOSIS — E11.9 CONTROLLED TYPE 2 DIABETES MELLITUS WITHOUT COMPLICATION, WITHOUT LONG-TERM CURRENT USE OF INSULIN (H): Primary | ICD-10-CM

## 2021-09-15 DIAGNOSIS — I10 HYPERTENSION GOAL BP (BLOOD PRESSURE) < 140/90: ICD-10-CM

## 2021-09-15 DIAGNOSIS — F33.0 MAJOR DEPRESSIVE DISORDER, RECURRENT EPISODE, MILD (H): ICD-10-CM

## 2021-09-15 PROCEDURE — 99214 OFFICE O/P EST MOD 30 MIN: CPT | Performed by: FAMILY MEDICINE

## 2021-09-15 RX ORDER — METFORMIN HCL 500 MG
TABLET, EXTENDED RELEASE 24 HR ORAL
Qty: 360 TABLET | Refills: 1 | Status: SHIPPED | OUTPATIENT
Start: 2021-09-15 | End: 2022-03-03

## 2021-09-15 RX ORDER — LOSARTAN POTASSIUM 50 MG/1
50 TABLET ORAL DAILY
Qty: 90 TABLET | Refills: 1 | Status: SHIPPED | OUTPATIENT
Start: 2021-09-15 | End: 2022-03-03

## 2021-09-15 RX ORDER — BUPROPION HYDROCHLORIDE 75 MG/1
75 TABLET ORAL AT BEDTIME
Qty: 30 TABLET | Refills: 0 | Status: SHIPPED | OUTPATIENT
Start: 2021-09-15 | End: 2021-10-09

## 2021-09-15 ASSESSMENT — PAIN SCALES - GENERAL: PAINLEVEL: NO PAIN (0)

## 2021-09-15 NOTE — PATIENT INSTRUCTIONS
Increase trulicity to 1.5mg weekly    Add wellbutrin for suspected side effects from Paroxetine.      Check sugar 3 times a week and vary when you check with at least one being fasting.

## 2021-09-15 NOTE — NURSING NOTE
"Chief Complaint   Patient presents with     Diabetes       Initial /80   Pulse 94   Temp 98.4  F (36.9  C) (Oral)   Resp 16   Wt 82.6 kg (182 lb 3.2 oz)   LMP 09/08/2021   BMI 31.77 kg/m   Estimated body mass index is 31.77 kg/m  as calculated from the following:    Height as of 3/2/21: 1.613 m (5' 3.5\").    Weight as of this encounter: 82.6 kg (182 lb 3.2 oz).  Medication Reconciliation: complete  Harris Cheung CMA    "

## 2021-10-06 DIAGNOSIS — F33.0 MAJOR DEPRESSIVE DISORDER, RECURRENT EPISODE, MILD (H): ICD-10-CM

## 2021-10-06 NOTE — TELEPHONE ENCOUNTER
Overdue for PHQ-9/ depression follow-up with provider.     No appointment pending at this time.  Routing to provider to advise.    Olga SHAHN, RN

## 2021-10-09 ENCOUNTER — MYC MEDICAL ADVICE (OUTPATIENT)
Dept: FAMILY MEDICINE | Facility: CLINIC | Age: 41
End: 2021-10-09

## 2021-10-12 RX ORDER — BUPROPION HYDROCHLORIDE 75 MG/1
TABLET ORAL
Qty: 90 TABLET | Refills: 1 | Status: SHIPPED | OUTPATIENT
Start: 2021-10-12 | End: 2022-03-03

## 2021-11-17 DIAGNOSIS — F41.9 ANXIETY: ICD-10-CM

## 2021-11-17 DIAGNOSIS — F33.0 MAJOR DEPRESSIVE DISORDER, RECURRENT EPISODE, MILD (H): ICD-10-CM

## 2021-11-17 RX ORDER — PAROXETINE HYDROCHLORIDE HEMIHYDRATE 25 MG/1
TABLET, FILM COATED, EXTENDED RELEASE ORAL
Qty: 180 TABLET | Refills: 0 | Status: SHIPPED | OUTPATIENT
Start: 2021-11-17 | End: 2022-02-14

## 2021-12-18 ENCOUNTER — HEALTH MAINTENANCE LETTER (OUTPATIENT)
Age: 41
End: 2021-12-18

## 2021-12-29 ENCOUNTER — LAB (OUTPATIENT)
Dept: LAB | Facility: CLINIC | Age: 41
End: 2021-12-29
Payer: COMMERCIAL

## 2021-12-29 DIAGNOSIS — E11.9 CONTROLLED TYPE 2 DIABETES MELLITUS WITHOUT COMPLICATION, WITHOUT LONG-TERM CURRENT USE OF INSULIN (H): ICD-10-CM

## 2021-12-29 LAB — HBA1C MFR BLD: 8.2 % (ref 0–5.6)

## 2021-12-29 PROCEDURE — 36415 COLL VENOUS BLD VENIPUNCTURE: CPT

## 2021-12-29 PROCEDURE — 83036 HEMOGLOBIN GLYCOSYLATED A1C: CPT

## 2022-02-10 DIAGNOSIS — E11.9 CONTROLLED TYPE 2 DIABETES MELLITUS WITHOUT COMPLICATION, WITHOUT LONG-TERM CURRENT USE OF INSULIN (H): ICD-10-CM

## 2022-02-10 RX ORDER — METFORMIN HCL 500 MG
TABLET, EXTENDED RELEASE 24 HR ORAL
Qty: 360 TABLET | Refills: 1 | OUTPATIENT
Start: 2022-02-10

## 2022-02-14 DIAGNOSIS — F41.9 ANXIETY: ICD-10-CM

## 2022-02-14 DIAGNOSIS — F33.0 MAJOR DEPRESSIVE DISORDER, RECURRENT EPISODE, MILD (H): ICD-10-CM

## 2022-02-14 RX ORDER — PAROXETINE HYDROCHLORIDE HEMIHYDRATE 25 MG/1
TABLET, FILM COATED, EXTENDED RELEASE ORAL
Qty: 60 TABLET | Refills: 0 | Status: SHIPPED | OUTPATIENT
Start: 2022-02-14 | End: 2022-03-03

## 2022-02-17 NOTE — PATIENT INSTRUCTIONS
Preventive Health Recommendations  Female Ages 40 to 49    Yearly exam:     See your health care provider every year in order to  1. Review health changes.   2. Discuss preventive care.    3. Review your medicines if your doctor prescribed any.      Get a Pap test every three years (unless you have an abnormal result and your provider advises testing more often).      If you get Pap tests with HPV test, you only need to test every 5 years, unless you have an abnormal result. You do not need a Pap test if your uterus was removed (hysterectomy) and you have not had cancer.      You should be tested each year for STDs (sexually transmitted diseases), if you're at risk.     Ask your doctor if you should have a mammogram.      Have a colonoscopy (test for colon cancer) if someone in your family has had colon cancer or polyps before age 50.       Have a cholesterol test every 5 years.       Have a diabetes test (fasting glucose) after age 45. If you are at risk for diabetes, you should have this test every 3 years.    Shots: Get a flu shot each year. Get a tetanus shot every 10 years.     Nutrition:     Eat at least 5 servings of fruits and vegetables each day.    Eat whole-grain bread, whole-wheat pasta and brown rice instead of white grains and rice.    Get adequate Calcium and Vitamin D.      Lifestyle    Exercise at least 150 minutes a week (an average of 30 minutes a day, 5 days a week). This will help you control your weight and prevent disease.    Limit alcohol to one drink per day.    No smoking.     Wear sunscreen to prevent skin cancer.    See your dentist every six months for an exam and cleaning.      Consider IUD - Mirena (7 yrs), Kyleena (5 years), Anita (3 years).

## 2022-02-17 NOTE — PROGRESS NOTES
SUBJECTIVE:   CC: Radha Wilson is an 42 year old woman who presents for preventive health visit.       Patient has been advised of split billing requirements and indicates understanding: Yes  Healthy Habits:     Getting at least 3 servings of Calcium per day:  Yes    Bi-annual eye exam:  Yes    Dental care twice a year:  Yes    Sleep apnea or symptoms of sleep apnea:  Daytime drowsiness    Diet:  Diabetic    Frequency of exercise:  1 day/week    Duration of exercise:  15-30 minutes    Taking medications regularly:  Yes    Medication side effects:  None    PHQ-2 Total Score: 0    Additional concerns today:  No              Today's PHQ-2 Score:   PHQ-2 ( 1999 [employer]) 2/28/2022   Q1: Little interest or pleasure in doing things 0   Q2: Feeling down, depressed or hopeless 0   PHQ-2 Score 0   PHQ-2 Total Score (12-17 Years)- Positive if 3 or more points; Administer PHQ-A if positive -   Q1: Little interest or pleasure in doing things Not at all   Q2: Feeling down, depressed or hopeless Not at all   PHQ-2 Score 0       Abuse: Current or Past (Physical, Sexual or Emotional) - No  Do you feel safe in your environment? Yes        Social History     Tobacco Use     Smoking status: Never Smoker     Smokeless tobacco: Never Used     Tobacco comment: Nonsmoking household   Substance Use Topics     Alcohol use: No     Comment: Minimal         Alcohol Use 2/28/2022   Prescreen: >3 drinks/day or >7 drinks/week? No   Prescreen: >3 drinks/day or >7 drinks/week? -       Reviewed orders with patient.  Reviewed health maintenance and updated orders accordingly - Yes    G 0 P 0   Patient's last menstrual period was 08/31/2021.     Fasting: Yes    Td: tdap 2/2020       Flu: 11/2021      Covid: PF boost 12/2/2021      Shingrix: NA      PPV: done -diabetes      Last 3 Pap and HPV Results:   PAP / HPV Latest Ref Rng & Units 2/24/2020 1/6/2015 12/16/2011   PAP (Historical) - NIL NIL NIL   HPV16 NEG:Negative Negative - -   HPV18  NEG:Negative Negative - -   HRHPV NEG:Negative Negative - -                  Cholesterol:   Lab Results   Component Value Date    CHOL 223 09/14/2021    CHOL 247 11/11/2020     Lab Results   Component Value Date    HDL 52 09/14/2021    HDL 49 11/11/2020     Lab Results   Component Value Date     09/14/2021     11/11/2020     Lab Results   Component Value Date    TRIG 355 09/14/2021    TRIG 328 11/11/2020     Lab Results   Component Value Date    CHOLHDLRATIO 4.2 11/14/2011         MMG: NA  Dexa:  NA     Flex/colo: NA      Seat Belt: Yes    Sunscreen use: Yes   Calcium Intake: adeq  Health Care Directive: Yes   Sexually Active: Yes     Current contraception: oral contraceptives  History of abnormal Pap smear: Yes: see pmh  Family history of colon/breast/ovarian cancer: No  Regular self breast exam: Yes  History of abnormal mammogram: No      BP Readings from Last 3 Encounters:   03/03/22 128/84   09/15/21 133/80   06/11/21 118/64    Wt Readings from Last 3 Encounters:   03/03/22 79.9 kg (176 lb 3.2 oz)   09/15/21 82.6 kg (182 lb 3.2 oz)   03/02/21 82.9 kg (182 lb 12.8 oz)                  Patient Active Problem List   Diagnosis     CARDIOVASCULAR SCREENING; LDL GOAL LESS THAN 160     Mild major depression (H)     Anxiety     Advanced directives, counseling/discussion     Myopia     Uncontrolled type 2 diabetes mellitus with microalbuminuria, without long-term current use of insulin (H)     Major depressive disorder, recurrent episode, mild (H)     Past Surgical History:   Procedure Laterality Date     ABDOMEN SURGERY       APPENDECTOMY  5/19/12    [facility]     EXAM OF VAGINA,COLPOSCOPY  9/20/06    Benign     HC TOOTH EXTRACTION W/FORCEP       TONSILLECTOMY & ADENOIDECTOMY         Social History     Tobacco Use     Smoking status: Never Smoker     Smokeless tobacco: Never Used     Tobacco comment: Nonsmoking household   Substance Use Topics     Alcohol use: No     Comment: Minimal     Family  History   Problem Relation Age of Onset     Cancer Mother         lymphoma     Other Cancer Mother         1994-current     Lipids Father      Hyperlipidemia Father      Bladder Cancer Father      Coronary Artery Disease Father         Cause of death     Other Cancer Father         Bladder     Heart Disease Maternal Grandfather         70s     Cancer - colorectal Paternal Grandmother         80s     Glaucoma Paternal Grandmother      Anesthesia Reaction Other         lung problems during appendectomy         Current Outpatient Medications   Medication Sig Dispense Refill     buPROPion (WELLBUTRIN) 75 MG tablet TAKE 1 TABLET(75 MG) BY MOUTH AT BEDTIME 90 tablet 1     Dulaglutide 3 MG/0.5ML SOPN Inject 3 mg Subcutaneous once a week 6 mL 0     IRON PO Take by mouth daily       levonorgestrel-ethinyl estradiol (QUASENSE) 0.15-0.03 MG tablet Skip week 4 of packs 1-3, then take week 4 of pack 4 112 tablet 3     losartan (COZAAR) 50 MG tablet Take 1 tablet (50 mg) by mouth daily 90 tablet 1     metFORMIN (GLUCOPHAGE-XR) 500 MG 24 hr tablet TAKE 2 TABLETS(1000 MG) BY MOUTH TWICE DAILY WITH MEALS 360 tablet 1     Multiple Vitamins-Minerals (MULTIVITAMIN PO) Take 1 tablet by mouth daily       PARoxetine (PAXIL-CR) 25 MG 24 hr tablet TAKE 2 TABLETS BY MOUTH EVERY  tablet 1     STATIN NOT PRESCRIBED (INTENTIONAL) Please choose reason not prescribed from choices below.       Recent Labs   Lab Test 03/03/22  1118 12/29/21  1047 09/14/21  1039 06/11/21  1031 02/12/21  1354 11/11/20  0958 02/24/20  1032 08/26/19  0730 07/28/17  1053 07/21/17  1014   A1C  --  8.2* 8.1* 7.7* 8.8* 8.3* 7.6* 7.2*   < >  --    LDL  --   --  100  --   --  132* 109*  --    < > 128  Test canceled after completion  CORRECTED ON 07/24 AT 1113: PREVIOUSLY REPORTED  Above desirable:  100 129   mg/dl Borderline High:  130 159 mg/dL High:             160 189 mg/dL Very   high:       >189 mg/dl     HDL  --   --  52  --   --  49* 58  --    < > 46*   Test canceled after completion  CORRECTED ON 07/24 AT 1113: PREVIOUSLY REPORTED AS 46     TRIG  --   --  355*  --   --  328* 247*  --    < > 319*  Test canceled after completion  CORRECTED ON 07/24 AT 1113: PREVIOUSLY REPORTED  Borderline high:  150 199   mg/dl High:             200 499 mg/dl Very high:       >499 mg/dl     CR  --   --  0.75  --  0.67 0.71 0.71  --    < >  --    GFRESTIMATED  --   --  >90  --  >90 >90 >90  --    < >  --    GFRESTBLACK  --   --   --   --  >90 >90 >90  --    < >  --    POTASSIUM 4.0  --  4.6  --  4.1 4.2 4.2  --    < >  --    TSH  --   --   --   --   --   --   --  1.73  --  2.35    < > = values in this interval not displayed.        Breast Cancer Screening:  Any new diagnosis of family breast, ovarian, or bowel cancer? No    Breast CA Risk Assessment (FHS-7) 2/27/2021   Do you have a family history of breast, colon, or ovarian cancer? No / Unknown         Mammogram Screening - Offered annual screening and updated Health Maintenance for mutual plan based on risk factor consideration    Pertinent mammograms are reviewed under the imaging tab.      Reviewed and updated as needed this visit by clinical staff   Tobacco  Allergies  Meds  Problems  Med Hx  Surg Hx  Fam Hx  Soc   Hx        Reviewed and updated as needed this visit by Provider   Tobacco  Allergies  Meds  Problems  Med Hx  Surg Hx  Fam Hx             Review of Systems   Constitutional: Negative for chills and fever.   HENT: Negative for congestion, ear pain, hearing loss and sore throat.    Eyes: Negative for pain and visual disturbance.   Respiratory: Negative for cough and shortness of breath.    Cardiovascular: Negative for chest pain, palpitations and peripheral edema.   Gastrointestinal: Negative for abdominal pain, constipation, diarrhea, heartburn, hematochezia and nausea.   Breasts:  Negative for tenderness, breast mass and discharge.   Genitourinary: Negative for dysuria, frequency, genital sores,  "hematuria, pelvic pain, urgency, vaginal bleeding and vaginal discharge.   Musculoskeletal: Negative for arthralgias, joint swelling and myalgias.   Skin: Negative for rash.   Neurological: Negative for dizziness, weakness, headaches and paresthesias.   Psychiatric/Behavioral: Negative for mood changes. The patient is not nervous/anxious.           OBJECTIVE:   /84   Pulse 83   Temp 98.4  F (36.9  C) (Oral)   Resp 16   Ht 1.588 m (5' 2.5\")   Wt 79.9 kg (176 lb 3.2 oz)   LMP 08/31/2021   SpO2 99%   BMI 31.71 kg/m    Physical Exam  GENERAL: healthy, alert and no distress  EYES: Eyes grossly normal to inspection, PERRL and conjunctivae and sclerae normal  HENT: ear canals and TM's normal, nose and mouth without ulcers or lesions  NECK: no adenopathy, no asymmetry, masses, or scars and thyroid normal to palpation  RESP: lungs clear to auscultation - no rales, rhonchi or wheezes  BREAST: normal without masses, tenderness or nipple discharge and no palpable axillary masses or adenopathy  CV: regular rate and rhythm, normal S1 S2, no S3 or S4, no murmur, click or rub, no peripheral edema and peripheral pulses strong  ABDOMEN: soft, nontender, no hepatosplenomegaly, no masses and bowel sounds normal  MS: no gross musculoskeletal defects noted, no edema  SKIN: no suspicious lesions or rashes  NEURO: Normal strength and tone, mentation intact and speech normal  PSYCH: mentation appears normal, affect normal/bright  Diabetic foot exam: normal DP and PT pulses, no trophic changes or ulcerative lesions, normal sensory exam and normal monofilament exam    Diagnostic Test Results:  Labs reviewed in Epic    ASSESSMENT/PLAN:   (Z00.00) Routine general medical examination at a health care facility  (primary encounter diagnosis)  Comment:preventive needs reviewed   Plan: levonorgestrel-ethinyl estradiol (QUASENSE)         0.15-0.03 MG tablet        Refill x 1 yr consider IUD if bp hard to control    (E11.29,  E11.65,  " "R80.9) Uncontrolled type 2 diabetes mellitus with microalbuminuria, without long-term current use of insulin (H)  Comment: too early for a1c, feels glucose readings are better  Plan: Albumin Random Urine Quantitative with Creat         Ratio, BASIC METABOLIC PANEL, metFORMIN         (GLUCOPHAGE-XR) 500 MG 24 hr tablet, Lipid         panel reflex to direct LDL Fasting        Recheck a1 in 4 weeks. Continue current meds   Refill x 6 months consider increase of trulicity to 4.5 if needed.   Continue wt loss efforts    (F41.9) Anxiety  (F33.0) Major depressive disorder, recurrent episode, mild (H)  Comment: stable and doing well  Plan: PARoxetine (PAXIL-CR) 25 MG 24 hr tablet,         buPROPion (WELLBUTRIN) 75 MG tablet         Refill x 6 months     (I10) Hypertension goal BP (blood pressure) < 140/90  Comment: to goal  Plan: losartan (COZAAR) 50 MG tablet         Refill x 6 months           COUNSELING:  Reviewed preventive health counseling, as reflected in patient instructions  Special attention given to:        Regular exercise       Healthy diet/nutrition    Estimated body mass index is 31.71 kg/m  as calculated from the following:    Height as of this encounter: 1.588 m (5' 2.5\").    Weight as of this encounter: 79.9 kg (176 lb 3.2 oz).    Weight management plan: Discussed healthy diet and exercise guidelines    She reports that she has never smoked. She has never used smokeless tobacco.      Counseling Resources:  ATP IV Guidelines  Pooled Cohorts Equation Calculator  Breast Cancer Risk Calculator  BRCA-Related Cancer Risk Assessment: FHS-7 Tool  FRAX Risk Assessment  ICSI Preventive Guidelines  Dietary Guidelines for Americans, 2010  USDA's MyPlate  ASA Prophylaxis  Lung CA Screening    Katy Moore MD  [facility] [place]  Answers for HPI/ROS submitted by the patient on 2/28/2022  If you checked off any problems, how difficult have these problems made it for you to do your work, take care of " things at home, or get along with other people?: Not difficult at all  PHQ9 TOTAL SCORE: 5  MARK 7 TOTAL SCORE: 0

## 2022-02-28 ASSESSMENT — ENCOUNTER SYMPTOMS
SORE THROAT: 0
PALPITATIONS: 0
CONSTIPATION: 0
ABDOMINAL PAIN: 0
COUGH: 0
NAUSEA: 0
FREQUENCY: 0
HEADACHES: 0
HEARTBURN: 0
HEMATOCHEZIA: 0
PARESTHESIAS: 0
DIARRHEA: 0
JOINT SWELLING: 0
ARTHRALGIAS: 0
MYALGIAS: 0
SHORTNESS OF BREATH: 0
NERVOUS/ANXIOUS: 0
EYE PAIN: 0
DYSURIA: 0
BREAST MASS: 0
HEMATURIA: 0
CHILLS: 0
WEAKNESS: 0
FEVER: 0
DIZZINESS: 0

## 2022-02-28 ASSESSMENT — ANXIETY QUESTIONNAIRES
GAD7 TOTAL SCORE: 0
5. BEING SO RESTLESS THAT IT IS HARD TO SIT STILL: NOT AT ALL
4. TROUBLE RELAXING: NOT AT ALL
1. FEELING NERVOUS, ANXIOUS, OR ON EDGE: NOT AT ALL
7. FEELING AFRAID AS IF SOMETHING AWFUL MIGHT HAPPEN: NOT AT ALL
2. NOT BEING ABLE TO STOP OR CONTROL WORRYING: NOT AT ALL
6. BECOMING EASILY ANNOYED OR IRRITABLE: NOT AT ALL
3. WORRYING TOO MUCH ABOUT DIFFERENT THINGS: NOT AT ALL
7. FEELING AFRAID AS IF SOMETHING AWFUL MIGHT HAPPEN: NOT AT ALL
GAD7 TOTAL SCORE: 0
GAD7 TOTAL SCORE: 0

## 2022-02-28 ASSESSMENT — PATIENT HEALTH QUESTIONNAIRE - PHQ9
SUM OF ALL RESPONSES TO PHQ QUESTIONS 1-9: 5
10. IF YOU CHECKED OFF ANY PROBLEMS, HOW DIFFICULT HAVE THESE PROBLEMS MADE IT FOR YOU TO DO YOUR WORK, TAKE CARE OF THINGS AT HOME, OR GET ALONG WITH OTHER PEOPLE: NOT DIFFICULT AT ALL
SUM OF ALL RESPONSES TO PHQ QUESTIONS 1-9: 5

## 2022-03-01 ASSESSMENT — ANXIETY QUESTIONNAIRES: GAD7 TOTAL SCORE: 0

## 2022-03-01 ASSESSMENT — PATIENT HEALTH QUESTIONNAIRE - PHQ9: SUM OF ALL RESPONSES TO PHQ QUESTIONS 1-9: 5

## 2022-03-02 ASSESSMENT — ENCOUNTER SYMPTOMS
HEARTBURN: 0
WEAKNESS: 0
DYSURIA: 0
FEVER: 0
DIZZINESS: 0
BREAST MASS: 0
MYALGIAS: 0
ARTHRALGIAS: 0
CHILLS: 0
HEMATURIA: 0
ABDOMINAL PAIN: 0
SHORTNESS OF BREATH: 0
DIARRHEA: 0
NAUSEA: 0
EYE PAIN: 0
HEMATOCHEZIA: 0
NERVOUS/ANXIOUS: 0
FREQUENCY: 0
HEADACHES: 0
PARESTHESIAS: 0
COUGH: 0
PALPITATIONS: 0
JOINT SWELLING: 0
CONSTIPATION: 0
SORE THROAT: 0

## 2022-03-03 ENCOUNTER — OFFICE VISIT (OUTPATIENT)
Dept: FAMILY MEDICINE | Facility: CLINIC | Age: 42
End: 2022-03-03
Payer: COMMERCIAL

## 2022-03-03 VITALS
RESPIRATION RATE: 16 BRPM | BODY MASS INDEX: 31.22 KG/M2 | WEIGHT: 176.2 LBS | SYSTOLIC BLOOD PRESSURE: 128 MMHG | HEIGHT: 63 IN | OXYGEN SATURATION: 99 % | DIASTOLIC BLOOD PRESSURE: 84 MMHG | HEART RATE: 83 BPM | TEMPERATURE: 98.4 F

## 2022-03-03 DIAGNOSIS — Z00.00 ROUTINE GENERAL MEDICAL EXAMINATION AT A HEALTH CARE FACILITY: Primary | ICD-10-CM

## 2022-03-03 DIAGNOSIS — I10 HYPERTENSION GOAL BP (BLOOD PRESSURE) < 140/90: ICD-10-CM

## 2022-03-03 DIAGNOSIS — F41.9 ANXIETY: ICD-10-CM

## 2022-03-03 DIAGNOSIS — F33.0 MAJOR DEPRESSIVE DISORDER, RECURRENT EPISODE, MILD (H): ICD-10-CM

## 2022-03-03 LAB
ANION GAP SERPL CALCULATED.3IONS-SCNC: 11 MMOL/L (ref 3–14)
BUN SERPL-MCNC: 11 MG/DL (ref 7–30)
CALCIUM SERPL-MCNC: 8.9 MG/DL (ref 8.5–10.1)
CHLORIDE BLD-SCNC: 106 MMOL/L (ref 94–109)
CHOLEST SERPL-MCNC: 228 MG/DL
CO2 SERPL-SCNC: 18 MMOL/L (ref 20–32)
CREAT SERPL-MCNC: 0.68 MG/DL (ref 0.52–1.04)
CREAT UR-MCNC: 164 MG/DL
FASTING STATUS PATIENT QL REPORTED: YES
GFR SERPL CREATININE-BSD FRML MDRD: >90 ML/MIN/1.73M2
GLUCOSE BLD-MCNC: 155 MG/DL (ref 70–99)
HDLC SERPL-MCNC: 52 MG/DL
LDLC SERPL CALC-MCNC: 137 MG/DL
MICROALBUMIN UR-MCNC: 31 MG/L
MICROALBUMIN/CREAT UR: 18.9 MG/G CR (ref 0–25)
NONHDLC SERPL-MCNC: 176 MG/DL
POTASSIUM BLD-SCNC: 4 MMOL/L (ref 3.4–5.3)
SODIUM SERPL-SCNC: 135 MMOL/L (ref 133–144)
TRIGL SERPL-MCNC: 196 MG/DL

## 2022-03-03 PROCEDURE — 99396 PREV VISIT EST AGE 40-64: CPT | Performed by: FAMILY MEDICINE

## 2022-03-03 PROCEDURE — 80061 LIPID PANEL: CPT | Performed by: FAMILY MEDICINE

## 2022-03-03 PROCEDURE — 99214 OFFICE O/P EST MOD 30 MIN: CPT | Mod: 25 | Performed by: FAMILY MEDICINE

## 2022-03-03 PROCEDURE — 82043 UR ALBUMIN QUANTITATIVE: CPT | Performed by: FAMILY MEDICINE

## 2022-03-03 PROCEDURE — 80048 BASIC METABOLIC PNL TOTAL CA: CPT | Performed by: FAMILY MEDICINE

## 2022-03-03 PROCEDURE — 36415 COLL VENOUS BLD VENIPUNCTURE: CPT | Performed by: FAMILY MEDICINE

## 2022-03-03 RX ORDER — PAROXETINE HYDROCHLORIDE HEMIHYDRATE 25 MG/1
TABLET, FILM COATED, EXTENDED RELEASE ORAL
Qty: 180 TABLET | Refills: 1 | Status: SHIPPED | OUTPATIENT
Start: 2022-03-03 | End: 2022-09-25

## 2022-03-03 RX ORDER — LOSARTAN POTASSIUM 50 MG/1
50 TABLET ORAL DAILY
Qty: 90 TABLET | Refills: 1 | Status: SHIPPED | OUTPATIENT
Start: 2022-03-03 | End: 2022-09-25

## 2022-03-03 RX ORDER — LEVONORGESTREL AND ETHINYL ESTRADIOL 0.15-0.03
KIT ORAL
Qty: 112 TABLET | Refills: 3 | Status: SHIPPED | OUTPATIENT
Start: 2022-03-03 | End: 2023-01-12

## 2022-03-03 RX ORDER — BUPROPION HYDROCHLORIDE 75 MG/1
TABLET ORAL
Qty: 90 TABLET | Refills: 1 | Status: SHIPPED | OUTPATIENT
Start: 2022-03-03 | End: 2022-09-08

## 2022-03-03 RX ORDER — METFORMIN HCL 500 MG
TABLET, EXTENDED RELEASE 24 HR ORAL
Qty: 360 TABLET | Refills: 1 | Status: SHIPPED | OUTPATIENT
Start: 2022-03-03 | End: 2022-09-06

## 2022-03-03 ASSESSMENT — PAIN SCALES - GENERAL: PAINLEVEL: NO PAIN (0)

## 2022-03-03 NOTE — NURSING NOTE
"Chief Complaint   Patient presents with     Physical     Hospital F/U       Initial BP (!) 143/86   Pulse 83   Temp 98.4  F (36.9  C) (Oral)   Resp 16   Ht 1.588 m (5' 2.5\")   Wt 79.9 kg (176 lb 3.2 oz)   LMP 08/31/2021   SpO2 99%   BMI 31.71 kg/m   Estimated body mass index is 31.71 kg/m  as calculated from the following:    Height as of this encounter: 1.588 m (5' 2.5\").    Weight as of this encounter: 79.9 kg (176 lb 3.2 oz).  Medication Reconciliation: complete  Harris Cheung CMA    "

## 2022-03-27 ENCOUNTER — MYC MEDICAL ADVICE (OUTPATIENT)
Dept: FAMILY MEDICINE | Facility: CLINIC | Age: 42
End: 2022-03-27
Payer: COMMERCIAL

## 2022-03-27 DIAGNOSIS — E11.9 CONTROLLED TYPE 2 DIABETES MELLITUS WITHOUT COMPLICATION, WITHOUT LONG-TERM CURRENT USE OF INSULIN (H): ICD-10-CM

## 2022-03-27 RX ORDER — DULAGLUTIDE 3 MG/.5ML
INJECTION, SOLUTION SUBCUTANEOUS
Qty: 6 ML | Refills: 0 | Status: SHIPPED | OUTPATIENT
Start: 2022-03-27 | End: 2022-06-27

## 2022-03-28 NOTE — PROGRESS NOTES
Diabetes Self-Management Training Class 1    Radha Wilson presents today for group education related to Type 2 diabetes   She is accompanied by self    Educational Topics Discussed  Pathophysiology of Type 2 Diabetes, Review of Blood Glucose Testing and Blood Glucose goal ranges, Accepting a Diagnosis of Diabetes, Exercise - why it is helpful for BG control, Carbohydrate Counting    Materials Provided  [place] Taking Charge of Your Diabetes  [place] Guide to Carbohydrate Counting    All questions were answered in the group setting. Patient to contact educator with specific questions, should need arise.    Plan and Follow-up  Patient to begin carb counting.  Patient to attend Diabetes Self-Management Training Class 2.  Patient to follow-up with educator regarding blood glucose values as determined at Diabetes Self-Management Training Initial Assessment Visit.     Patient-stated goal written and given to patient.     Harriet Naranjo RN  BSN CDE    Time Spent: 120 minutes    
- - -

## 2022-04-08 ENCOUNTER — LAB (OUTPATIENT)
Dept: LAB | Facility: CLINIC | Age: 42
End: 2022-04-08
Payer: COMMERCIAL

## 2022-04-08 DIAGNOSIS — E11.9 CONTROLLED TYPE 2 DIABETES MELLITUS WITHOUT COMPLICATION, WITHOUT LONG-TERM CURRENT USE OF INSULIN (H): ICD-10-CM

## 2022-04-08 LAB — HBA1C MFR BLD: 7.7 % (ref 0–5.6)

## 2022-04-08 PROCEDURE — 83036 HEMOGLOBIN GLYCOSYLATED A1C: CPT

## 2022-04-08 PROCEDURE — 36415 COLL VENOUS BLD VENIPUNCTURE: CPT

## 2022-05-12 ENCOUNTER — TELEPHONE (OUTPATIENT)
Dept: FAMILY MEDICINE | Facility: CLINIC | Age: 42
End: 2022-05-12
Payer: COMMERCIAL

## 2022-05-12 NOTE — TELEPHONE ENCOUNTER
Prior Authorization Retail Medication Request    Medication/Dose: PARoxetine (PAXIL-CR) 25 MG 24 hr tablet  ICD code (if different than what is on RX):    Major depressive disorder, recurrent episode, mild (H) [F33.0]       Anxiety [F41.9]           Previously Tried and Failed:    Rationale:      Covermymeds Key:  [id number]

## 2022-05-17 NOTE — TELEPHONE ENCOUNTER
Prior Authorization Approval    Authorization Effective Date: 4/17/2022  Authorization Expiration Date: 5/17/2023  Medication: PARoxetine (PAXIL-CR) 25 MG 24 hr tablet-APPROVED  Approved Dose/Quantity:   Reference #:     Insurance Company: [employer] - Phone [phone] Fax [phone]  Expected CoPay:       CoPay Card Available:      Foundation Assistance Needed:    Which Pharmacy is filling the prescription (Not needed for infusion/clinic administered): [facility][id number] - [place] [address] AT [facility] [address] [facility]  Pharmacy Notified: Yes  Patient Notified: No    **Instructed pharmacy to notify patient when script is ready to /ship.**

## 2022-05-17 NOTE — TELEPHONE ENCOUNTER
Central Prior Authorization Team   Phone: [phone]      PA Initiation    Medication: PARoxetine (PAXIL-CR) 25 MG 24 hr tablet  Insurance Company: [employer] - Phone [phone] Fax [phone]  Pharmacy Filling the Rx: [facility] #[id number] - [facility] [address] AT [facility] [address]  Filling Pharmacy Phone: [phone]  Filling Pharmacy Fax:    Start Date: 5/17/2022

## 2022-06-27 DIAGNOSIS — E11.9 CONTROLLED TYPE 2 DIABETES MELLITUS WITHOUT COMPLICATION, WITHOUT LONG-TERM CURRENT USE OF INSULIN (H): ICD-10-CM

## 2022-06-27 RX ORDER — DULAGLUTIDE 3 MG/.5ML
INJECTION, SOLUTION SUBCUTANEOUS
Qty: 6 ML | Refills: 0 | Status: SHIPPED | OUTPATIENT
Start: 2022-06-27 | End: 2022-09-20

## 2022-06-30 ENCOUNTER — LAB (OUTPATIENT)
Dept: LAB | Facility: CLINIC | Age: 42
End: 2022-06-30
Payer: COMMERCIAL

## 2022-06-30 DIAGNOSIS — E11.9 CONTROLLED TYPE 2 DIABETES MELLITUS WITHOUT COMPLICATION, WITHOUT LONG-TERM CURRENT USE OF INSULIN (H): ICD-10-CM

## 2022-06-30 LAB — HBA1C MFR BLD: 7.9 % (ref 0–5.6)

## 2022-06-30 PROCEDURE — 83036 HEMOGLOBIN GLYCOSYLATED A1C: CPT

## 2022-06-30 PROCEDURE — 36415 COLL VENOUS BLD VENIPUNCTURE: CPT

## 2022-07-06 ENCOUNTER — TRANSFERRED RECORDS (OUTPATIENT)
Dept: MULTI SPECIALTY CLINIC | Facility: CLINIC | Age: 42
End: 2022-07-06

## 2022-07-06 LAB — RETINOPATHY: NEGATIVE

## 2022-09-06 RX ORDER — METFORMIN HCL 500 MG
TABLET, EXTENDED RELEASE 24 HR ORAL
Qty: 360 TABLET | Refills: 0 | Status: SHIPPED | OUTPATIENT
Start: 2022-09-06 | End: 2022-11-28

## 2022-09-20 DIAGNOSIS — E11.9 CONTROLLED TYPE 2 DIABETES MELLITUS WITHOUT COMPLICATION, WITHOUT LONG-TERM CURRENT USE OF INSULIN (H): ICD-10-CM

## 2022-09-20 RX ORDER — DULAGLUTIDE 3 MG/.5ML
INJECTION, SOLUTION SUBCUTANEOUS
Qty: 2 ML | Refills: 0 | Status: SHIPPED | OUTPATIENT
Start: 2022-09-20 | End: 2022-10-10

## 2022-09-24 DIAGNOSIS — F33.0 MAJOR DEPRESSIVE DISORDER, RECURRENT EPISODE, MILD (H): ICD-10-CM

## 2022-09-24 DIAGNOSIS — F41.9 ANXIETY: ICD-10-CM

## 2022-09-24 DIAGNOSIS — I10 HYPERTENSION GOAL BP (BLOOD PRESSURE) < 140/90: ICD-10-CM

## 2022-09-25 RX ORDER — LOSARTAN POTASSIUM 50 MG/1
TABLET ORAL
Qty: 30 TABLET | Refills: 1 | Status: SHIPPED | OUTPATIENT
Start: 2022-09-25 | End: 2022-10-10

## 2022-09-25 RX ORDER — PAROXETINE HYDROCHLORIDE HEMIHYDRATE 25 MG/1
TABLET, FILM COATED, EXTENDED RELEASE ORAL
Qty: 30 TABLET | Refills: 1 | Status: SHIPPED | OUTPATIENT
Start: 2022-09-25 | End: 2022-10-10

## 2022-10-03 ENCOUNTER — LAB (OUTPATIENT)
Dept: LAB | Facility: CLINIC | Age: 42
End: 2022-10-03
Payer: COMMERCIAL

## 2022-10-03 DIAGNOSIS — E11.29 TYPE 2 DIABETES MELLITUS WITH OTHER DIABETIC KIDNEY COMPLICATION (H): ICD-10-CM

## 2022-10-03 LAB
ANION GAP SERPL CALCULATED.3IONS-SCNC: 6 MMOL/L (ref 3–14)
BUN SERPL-MCNC: 11 MG/DL (ref 7–30)
CALCIUM SERPL-MCNC: 9.3 MG/DL (ref 8.5–10.1)
CHLORIDE BLD-SCNC: 106 MMOL/L (ref 94–109)
CO2 SERPL-SCNC: 24 MMOL/L (ref 20–32)
CREAT SERPL-MCNC: 0.69 MG/DL (ref 0.52–1.04)
GFR SERPL CREATININE-BSD FRML MDRD: >90 ML/MIN/1.73M2
GLUCOSE BLD-MCNC: 139 MG/DL (ref 70–99)
HBA1C MFR BLD: 8.1 % (ref 0–5.6)
POTASSIUM BLD-SCNC: 3.9 MMOL/L (ref 3.4–5.3)
SODIUM SERPL-SCNC: 136 MMOL/L (ref 133–144)

## 2022-10-03 PROCEDURE — 80048 BASIC METABOLIC PNL TOTAL CA: CPT

## 2022-10-03 PROCEDURE — 36415 COLL VENOUS BLD VENIPUNCTURE: CPT

## 2022-10-03 PROCEDURE — 83036 HEMOGLOBIN GLYCOSYLATED A1C: CPT

## 2022-10-04 PROBLEM — E11.29 TYPE 2 DIABETES MELLITUS WITH OTHER DIABETIC KIDNEY COMPLICATION (H): Status: ACTIVE | Noted: 2018-02-09

## 2022-10-09 ENCOUNTER — HEALTH MAINTENANCE LETTER (OUTPATIENT)
Age: 42
End: 2022-10-09

## 2022-10-10 ENCOUNTER — OFFICE VISIT (OUTPATIENT)
Dept: FAMILY MEDICINE | Facility: CLINIC | Age: 42
End: 2022-10-10
Payer: COMMERCIAL

## 2022-10-10 VITALS
RESPIRATION RATE: 20 BRPM | WEIGHT: 176.8 LBS | BODY MASS INDEX: 31.82 KG/M2 | HEART RATE: 90 BPM | TEMPERATURE: 98.4 F | DIASTOLIC BLOOD PRESSURE: 87 MMHG | OXYGEN SATURATION: 97 % | SYSTOLIC BLOOD PRESSURE: 132 MMHG

## 2022-10-10 DIAGNOSIS — F33.0 MAJOR DEPRESSIVE DISORDER, RECURRENT EPISODE, MILD (H): ICD-10-CM

## 2022-10-10 DIAGNOSIS — I10 HYPERTENSION GOAL BP (BLOOD PRESSURE) < 140/90: ICD-10-CM

## 2022-10-10 DIAGNOSIS — E11.9 CONTROLLED TYPE 2 DIABETES MELLITUS WITHOUT COMPLICATION, WITHOUT LONG-TERM CURRENT USE OF INSULIN (H): Primary | ICD-10-CM

## 2022-10-10 DIAGNOSIS — F41.9 ANXIETY: ICD-10-CM

## 2022-10-10 PROCEDURE — 99214 OFFICE O/P EST MOD 30 MIN: CPT | Performed by: FAMILY MEDICINE

## 2022-10-10 RX ORDER — LOSARTAN POTASSIUM 50 MG/1
50 TABLET ORAL DAILY
Qty: 90 TABLET | Refills: 1 | Status: SHIPPED | OUTPATIENT
Start: 2022-10-10 | End: 2023-03-20

## 2022-10-10 RX ORDER — PAROXETINE HYDROCHLORIDE HEMIHYDRATE 25 MG/1
50 TABLET, FILM COATED, EXTENDED RELEASE ORAL DAILY
Qty: 180 TABLET | Refills: 1 | Status: SHIPPED | OUTPATIENT
Start: 2022-10-10 | End: 2023-03-20

## 2022-10-10 RX ORDER — DULAGLUTIDE 3 MG/.5ML
3 INJECTION, SOLUTION SUBCUTANEOUS
Qty: 6 ML | Refills: 1 | Status: SHIPPED | OUTPATIENT
Start: 2022-10-10 | End: 2023-03-03

## 2022-10-10 ASSESSMENT — PAIN SCALES - GENERAL: PAINLEVEL: NO PAIN (0)

## 2022-10-10 NOTE — PROGRESS NOTES
"  Assessment & Plan     (E11.9) Controlled type 2 diabetes mellitus without complication, without long-term current use of insulin (H)  (primary encounter diagnosis)  Comment: A1C 8.1 on 10/3/22. No change in medication due to Radha imaking life style changes with diet and exercise.  Plan: Recheck Hemoglobin A1c in 3 months   Dulaglutide (TRULICITY) 3 MG/0.5ML SOPN,   Follow-up for 3 month a1c   Refill x 6 months f/u 6 months for OV and labs - preventive            (I10) Hypertension goal BP (blood pressure) < 140/90  Comment: Controlled. Continue current dose of losartan.  Plan: losartan (COZAAR) 50 MG tablet   Refill x 6 months         (F33.0) Major depressive disorder, recurrent episode, mild (H)  (F41.9) Anxiety  Comment: Controlled. Continue current dose of paroxetine.  Plan: PARoxetine (PAXIL-CR) 25 MG 24 hr tablet   Refill x 6 months.                 BMI:   Estimated body mass index is 31.82 kg/m  as calculated from the following:    Height as of 3/3/22: 1.588 m (5' 2.5\").    Weight as of this encounter: 80.2 kg (176 lb 12.8 oz).   Weight management plan: Discussed healthy diet and exercise guidelines    Patient Instructions       Continue all medications.    Work on lifestyle changes as you are.          Return in about 3 months (around 1/10/2023) for Non-fasting Lab Work.    Katy Moore MD  [facility]   Shanta is a 42 year old, presenting for the following health issues:  Diabetes      History of Present Illness       Diabetes:   She presents for follow up of diabetes.  She is checking home blood glucose a few times a month. She checks blood glucose before meals.  Blood glucose is never over 200 and never under 70. She is aware of hypoglycemia symptoms including shakiness. She has no concerns regarding her diabetes at this time.  She is not experiencing numbness or burning in feet, excessive thirst, blurry vision, weight changes or redness, sores or blisters on " feet. The patient has had a diabetic eye exam in the last 12 months. Eye exam performed on 7/6/2022. Location of last eye exam [facility].        Hypertension: She presents for follow up of hypertension.  She does not check blood pressure  regularly outside of the clinic. Outside blood pressures have been over 140/90. She follows a low salt diet.       Diabetes  She is not consistent with checking blood sugars. Her last check was 160. She is aware her A1C is high. She just went through a period this summer where she worked three jobs and was eating out for most of her meals. That time is over. She is starting to make some changes in her life with exercising and diet.          BP Readings from Last 3 Encounters:   10/10/22 132/87   03/03/22 128/84   09/15/21 133/80    Wt Readings from Last 3 Encounters:   10/10/22 80.2 kg (176 lb 12.8 oz)   03/03/22 79.9 kg (176 lb 3.2 oz)   09/15/21 82.6 kg (182 lb 3.2 oz)                  Patient Active Problem List   Diagnosis     CARDIOVASCULAR SCREENING; LDL GOAL LESS THAN 160     Mild major depression (H)     Anxiety     Advanced directives, counseling/discussion     Myopia     Uncontrolled type 2 diabetes mellitus with microalbuminuria, without long-term current use of insulin     Major depressive disorder, recurrent episode, mild (H)     Hypertension goal BP (blood pressure) < 140/90     Past Surgical History:   Procedure Laterality Date     ABDOMEN SURGERY       APPENDECTOMY  5/19/12    [facility]     EXAM OF VAGINA,COLPOSCOPY  9/20/06    Benign     HC TOOTH EXTRACTION W/FORCEP       TONSILLECTOMY & ADENOIDECTOMY         Social History     Tobacco Use     Smoking status: Never     Smokeless tobacco: Never     Tobacco comments:     Nonsmoking household   Substance Use Topics     Alcohol use: No     Comment: Minimal     Family History   Problem Relation Age of Onset     Cancer Mother         lymphoma     Other Cancer Mother         1994-current     Lipids  Father      Hyperlipidemia Father      Bladder Cancer Father      Coronary Artery Disease Father         Cause of death     Other Cancer Father         Bladder     Heart Disease Maternal Grandfather         70s     Cancer - colorectal Paternal Grandmother         80s     Glaucoma Paternal Grandmother      Anesthesia Reaction Other         lung problems during appendectomy         Current Outpatient Medications   Medication Sig Dispense Refill     buPROPion (WELLBUTRIN) 75 MG tablet TAKE 1 TABLET(75 MG) BY MOUTH AT BEDTIME 90 tablet 0     IRON PO Take by mouth daily       levonorgestrel-ethinyl estradiol (QUASENSE) 0.15-0.03 MG tablet Skip week 4 of packs 1-3, then take week 4 of pack 4 112 tablet 3     losartan (COZAAR) 50 MG tablet TAKE 1 TABLET(50 MG) BY MOUTH DAILY 30 tablet 1     metFORMIN (GLUCOPHAGE XR) 500 MG 24 hr tablet TAKE 2 TABLETS(1000 MG) BY MOUTH TWICE DAILY WITH MEALS 360 tablet 0     Multiple Vitamins-Minerals (MULTIVITAMIN PO) Take 1 tablet by mouth daily       PARoxetine (PAXIL-CR) 25 MG 24 hr tablet TAKE 2 TABLETS BY MOUTH EVERY DAY 30 tablet 1     STATIN NOT PRESCRIBED (INTENTIONAL) Please choose reason not prescribed from choices below.       TRULICITY 3 MG/0.5ML SOPN INJECT 3MG UNDER THE SKIN ONCE A WEEK 2 mL 0     Recent Labs   Lab Test 10/03/22  0912 06/30/22  1130 04/08/22  1005 03/03/22  1118 12/29/21  1047 09/14/21  1039 06/11/21  1031 02/12/21  1354 11/11/20  0958 02/24/20  1032 08/26/19  0730 07/28/17  1053 07/21/17  1014   A1C 8.1* 7.9* 7.7*  --    < > 8.1*   < > 8.8* 8.3*   < > 7.2*   < >  --    LDL  --   --   --  137*  --  100  --   --  132*   < >  --    < > 128  Test canceled after completion  CORRECTED ON 07/24 AT 1113: PREVIOUSLY REPORTED  Above desirable:  100 129   mg/dl Borderline High:  130 159 mg/dL High:             160 189 mg/dL Very   high:       >189 mg/dl     HDL  --   --   --  52  --  52  --   --  49*   < >  --    < > 46*  Test canceled after  completion  CORRECTED ON 07/24 AT 1113: PREVIOUSLY REPORTED AS 46     TRIG  --   --   --  196*  --  355*  --   --  328*   < >  --    < > 319*  Test canceled after completion  CORRECTED ON 07/24 AT 1113: PREVIOUSLY REPORTED  Borderline high:  150 199   mg/dl High:             200 499 mg/dl Very high:       >499 mg/dl     CR 0.69  --   --  0.68  --  0.75   < > 0.67 0.71   < >  --    < >  --    GFRESTIMATED >90  --   --  >90  --  >90   < > >90 >90   < >  --    < >  --    GFRESTBLACK  --   --   --   --   --   --   --  >90 >90   < >  --    < >  --    POTASSIUM 3.9  --   --  4.0  --  4.6   < > 4.1 4.2   < >  --    < >  --    TSH  --   --   --   --   --   --   --   --   --   --  1.73  --  2.35    < > = values in this interval not displayed.         Review of Systems   Constitutional, HEENT, cardiovascular, pulmonary, gi and gu systems are negative, except as otherwise noted.      Objective    /87   Pulse 90   Temp 98.4  F (36.9  C) (Oral)   Resp 20   Wt 80.2 kg (176 lb 12.8 oz)   SpO2 97%   BMI 31.82 kg/m    Body mass index is 31.82 kg/m .  Physical Exam   GENERAL: healthy, alert and no distress  EYES: Eyes grossly normal to inspection, PERRL and conjunctivae and sclerae normal  HENT: ear canals and TM's normal, nose and mouth without ulcers or lesions  NECK: no adenopathy, no asymmetry, masses, or scars and thyroid normal to palpation  RESP: lungs clear to auscultation - no rales, rhonchi or wheezes  BREAST: normal without masses, tenderness or nipple discharge and no palpable axillary masses or adenopathy  CV: regular rate and rhythm, normal S1 S2, no S3 or S4, no murmur, click or rub, no peripheral edema and peripheral pulses strong  ABDOMEN: soft, nontender, no hepatosplenomegaly, no masses and bowel sounds normal  MS: no gross musculoskeletal defects noted, no edema  SKIN: no suspicious lesions or rashes  NEURO: Normal strength and tone, mentation intact and speech normal  PSYCH: mentation appears  normal, affect normal/bright

## 2022-10-10 NOTE — NURSING NOTE
"Chief Complaint   Patient presents with     Diabetes       Initial /87   Pulse 90   Temp 98.4  F (36.9  C) (Oral)   Resp 20   Wt 80.2 kg (176 lb 12.8 oz)   SpO2 97%   BMI 31.82 kg/m   Estimated body mass index is 31.82 kg/m  as calculated from the following:    Height as of 3/3/22: 1.588 m (5' 2.5\").    Weight as of this encounter: 80.2 kg (176 lb 12.8 oz).  Medication Reconciliation: complete  Harris Cheung CMA    "

## 2022-10-22 DIAGNOSIS — E11.9 CONTROLLED TYPE 2 DIABETES MELLITUS WITHOUT COMPLICATION, WITHOUT LONG-TERM CURRENT USE OF INSULIN (H): ICD-10-CM

## 2022-10-22 RX ORDER — DULAGLUTIDE 3 MG/.5ML
INJECTION, SOLUTION SUBCUTANEOUS
Qty: 2 ML | OUTPATIENT
Start: 2022-10-22

## 2022-10-27 DIAGNOSIS — I10 HYPERTENSION GOAL BP (BLOOD PRESSURE) < 140/90: ICD-10-CM

## 2022-10-27 RX ORDER — LOSARTAN POTASSIUM 50 MG/1
TABLET ORAL
Qty: 90 TABLET | Refills: 1 | OUTPATIENT
Start: 2022-10-27

## 2022-11-04 ENCOUNTER — VIRTUAL VISIT (OUTPATIENT)
Dept: INTERNAL MEDICINE | Facility: CLINIC | Age: 42
End: 2022-11-04
Payer: COMMERCIAL

## 2022-11-04 DIAGNOSIS — U07.1 INFECTION DUE TO 2019 NOVEL CORONAVIRUS: Primary | ICD-10-CM

## 2022-11-04 DIAGNOSIS — E11.29 TYPE 2 DIABETES MELLITUS WITH OTHER DIABETIC KIDNEY COMPLICATION (H): ICD-10-CM

## 2022-11-04 DIAGNOSIS — I10 HYPERTENSION GOAL BP (BLOOD PRESSURE) < 140/90: ICD-10-CM

## 2022-11-04 PROCEDURE — 99214 OFFICE O/P EST MOD 30 MIN: CPT | Mod: 95 | Performed by: PHYSICIAN ASSISTANT

## 2022-11-28 DIAGNOSIS — E11.29 TYPE 2 DIABETES MELLITUS WITH OTHER DIABETIC KIDNEY COMPLICATION (H): ICD-10-CM

## 2022-11-28 DIAGNOSIS — F33.0 MAJOR DEPRESSIVE DISORDER, RECURRENT EPISODE, MILD (H): ICD-10-CM

## 2022-11-28 RX ORDER — METFORMIN HCL 500 MG
TABLET, EXTENDED RELEASE 24 HR ORAL
Qty: 360 TABLET | Refills: 0 | Status: SHIPPED | OUTPATIENT
Start: 2022-11-28 | End: 2023-01-12

## 2022-11-28 RX ORDER — BUPROPION HYDROCHLORIDE 75 MG/1
TABLET ORAL
Qty: 90 TABLET | Refills: 0 | Status: SHIPPED | OUTPATIENT
Start: 2022-11-28 | End: 2023-01-12

## 2022-11-29 DIAGNOSIS — I10 HYPERTENSION GOAL BP (BLOOD PRESSURE) < 140/90: ICD-10-CM

## 2022-11-29 RX ORDER — LOSARTAN POTASSIUM 50 MG/1
TABLET ORAL
Qty: 30 TABLET | OUTPATIENT
Start: 2022-11-29

## 2023-01-10 ENCOUNTER — LAB (OUTPATIENT)
Dept: LAB | Facility: CLINIC | Age: 43
End: 2023-01-10
Payer: COMMERCIAL

## 2023-01-10 DIAGNOSIS — E11.9 CONTROLLED TYPE 2 DIABETES MELLITUS WITHOUT COMPLICATION, WITHOUT LONG-TERM CURRENT USE OF INSULIN (H): ICD-10-CM

## 2023-01-10 LAB — HBA1C MFR BLD: 7.5 % (ref 0–5.6)

## 2023-01-10 PROCEDURE — 36415 COLL VENOUS BLD VENIPUNCTURE: CPT

## 2023-01-10 PROCEDURE — 83036 HEMOGLOBIN GLYCOSYLATED A1C: CPT

## 2023-01-12 DIAGNOSIS — F33.0 MAJOR DEPRESSIVE DISORDER, RECURRENT EPISODE, MILD (H): ICD-10-CM

## 2023-01-12 DIAGNOSIS — E11.29 TYPE 2 DIABETES MELLITUS WITH OTHER DIABETIC KIDNEY COMPLICATION (H): ICD-10-CM

## 2023-01-12 DIAGNOSIS — Z00.00 ROUTINE GENERAL MEDICAL EXAMINATION AT A HEALTH CARE FACILITY: ICD-10-CM

## 2023-01-12 RX ORDER — VALACYCLOVIR HYDROCHLORIDE 1 G/1
TABLET, FILM COATED ORAL
COMMUNITY
Start: 2023-01-12

## 2023-01-12 RX ORDER — LEVONORGESTREL AND ETHINYL ESTRADIOL 0.15-0.03
KIT ORAL
Qty: 112 TABLET | Refills: 0 | Status: SHIPPED | OUTPATIENT
Start: 2023-01-12 | End: 2023-03-20

## 2023-01-12 RX ORDER — BUPROPION HYDROCHLORIDE 75 MG/1
TABLET ORAL
Qty: 90 TABLET | Refills: 0 | Status: SHIPPED | OUTPATIENT
Start: 2023-01-12 | End: 2023-03-20

## 2023-01-12 RX ORDER — METFORMIN HCL 500 MG
1000 TABLET, EXTENDED RELEASE 24 HR ORAL 2 TIMES DAILY WITH MEALS
Qty: 360 TABLET | Refills: 0 | Status: SHIPPED | OUTPATIENT
Start: 2023-01-12 | End: 2023-03-20

## 2023-01-13 ENCOUNTER — TELEPHONE (OUTPATIENT)
Dept: FAMILY MEDICINE | Facility: CLINIC | Age: 43
End: 2023-01-13
Payer: COMMERCIAL

## 2023-01-13 NOTE — TELEPHONE ENCOUNTER
Prior Authorization Retail Medication Request    Medication/Dose: Dulaglutide (TRULICITY) 3 MG/0.5ML SOPN  ICD code (if different than what is on RX):  Controlled type 2 diabetes mellitus without complication, without long-term current use of insulin (H) [E11.9]   Previously Tried and Failed:    Rationale:      Insurance Name:  [employer]  Insurance ID:  [id number]  BIN: [id number]  PCN: [id number]      Pharmacy Information (if different than what is on RX)  Name:  [facility]  Phone:  [phone]

## 2023-01-17 NOTE — TELEPHONE ENCOUNTER
Central Prior Authorization Team   Phone: [phone]      PA Initiation    Medication: Dulaglutide (TRULICITY) 3 MG/0.5ML SOPN  Insurance Company: [employer] ([facility]) - Phone [phone] Fax [phone]  Pharmacy Filling the Rx: [facility] #[id number] - [place] [address] AT [facility] [address]  Filling Pharmacy Phone: [phone]  Filling Pharmacy Fax:    Start Date: 1/17/2023

## 2023-01-17 NOTE — TELEPHONE ENCOUNTER
Prior Authorization Approval    Authorization Effective Date: 1/17/2023  Authorization Expiration Date: 1/17/2024  Medication: Dulaglutide (TRULICITY) 3 MG/0.5ML SOPN-APPROVED  Approved Dose/Quantity:   Reference #:     Insurance Company: [employer] ([facility]) - Phone [phone] Fax [phone]  Expected CoPay:       CoPay Card Available:      Foundation Assistance Needed:    Which Pharmacy is filling the prescription (Not needed for infusion/clinic administered): [facility] #[id number] - [place], [place] - [address] AT [facility] [address] [facility]  Pharmacy Notified: Yes  Patient Notified: No  **Instructed pharmacy to notify patient when script is ready to /ship.**

## 2023-02-18 ENCOUNTER — HEALTH MAINTENANCE LETTER (OUTPATIENT)
Age: 43
End: 2023-02-18

## 2023-03-03 DIAGNOSIS — E11.9 CONTROLLED TYPE 2 DIABETES MELLITUS WITHOUT COMPLICATION, WITHOUT LONG-TERM CURRENT USE OF INSULIN (H): ICD-10-CM

## 2023-03-03 RX ORDER — DULAGLUTIDE 3 MG/.5ML
INJECTION, SOLUTION SUBCUTANEOUS
Qty: 4 ML | Refills: 0 | Status: SHIPPED | OUTPATIENT
Start: 2023-03-03 | End: 2023-03-20

## 2023-03-08 ENCOUNTER — ANCILLARY PROCEDURE (OUTPATIENT)
Dept: MAMMOGRAPHY | Facility: CLINIC | Age: 43
End: 2023-03-08
Attending: FAMILY MEDICINE
Payer: COMMERCIAL

## 2023-03-08 DIAGNOSIS — Z12.31 VISIT FOR SCREENING MAMMOGRAM: ICD-10-CM

## 2023-03-08 PROCEDURE — 77063 BREAST TOMOSYNTHESIS BI: CPT | Mod: TC | Performed by: RADIOLOGY

## 2023-03-08 PROCEDURE — 77067 SCR MAMMO BI INCL CAD: CPT | Mod: TC | Performed by: RADIOLOGY

## 2023-03-13 ASSESSMENT — ENCOUNTER SYMPTOMS
JOINT SWELLING: 0
DIARRHEA: 0
BREAST MASS: 0
HEMATOCHEZIA: 0
NERVOUS/ANXIOUS: 0
CHILLS: 0
EYE PAIN: 0
CONSTIPATION: 0
MYALGIAS: 0
FREQUENCY: 0
SHORTNESS OF BREATH: 0
WEAKNESS: 0
PALPITATIONS: 0
COUGH: 1
ARTHRALGIAS: 0
SORE THROAT: 0
DIZZINESS: 0
FEVER: 0
ABDOMINAL PAIN: 0
HEARTBURN: 0
HEMATURIA: 0
PARESTHESIAS: 0
DYSURIA: 0
NAUSEA: 0
HEADACHES: 0

## 2023-03-20 ENCOUNTER — OFFICE VISIT (OUTPATIENT)
Dept: FAMILY MEDICINE | Facility: CLINIC | Age: 43
End: 2023-03-20
Payer: COMMERCIAL

## 2023-03-20 VITALS
OXYGEN SATURATION: 98 % | WEIGHT: 176 LBS | RESPIRATION RATE: 18 BRPM | SYSTOLIC BLOOD PRESSURE: 144 MMHG | BODY MASS INDEX: 31.18 KG/M2 | TEMPERATURE: 98.6 F | DIASTOLIC BLOOD PRESSURE: 94 MMHG | HEART RATE: 94 BPM | HEIGHT: 63 IN

## 2023-03-20 DIAGNOSIS — I10 HYPERTENSION GOAL BP (BLOOD PRESSURE) < 140/90: ICD-10-CM

## 2023-03-20 DIAGNOSIS — F41.9 ANXIETY: ICD-10-CM

## 2023-03-20 DIAGNOSIS — F33.0 MAJOR DEPRESSIVE DISORDER, RECURRENT EPISODE, MILD (H): ICD-10-CM

## 2023-03-20 DIAGNOSIS — E11.9 CONTROLLED TYPE 2 DIABETES MELLITUS WITHOUT COMPLICATION, WITHOUT LONG-TERM CURRENT USE OF INSULIN (H): ICD-10-CM

## 2023-03-20 DIAGNOSIS — Z00.00 ROUTINE GENERAL MEDICAL EXAMINATION AT A HEALTH CARE FACILITY: Primary | ICD-10-CM

## 2023-03-20 PROBLEM — E11.29 TYPE 2 DIABETES MELLITUS WITH OTHER DIABETIC KIDNEY COMPLICATION (H): Status: RESOLVED | Noted: 2018-02-09 | Resolved: 2023-03-20

## 2023-03-20 LAB
ANION GAP SERPL CALCULATED.3IONS-SCNC: 6 MMOL/L (ref 3–14)
BUN SERPL-MCNC: 12 MG/DL (ref 7–30)
CALCIUM SERPL-MCNC: 9.2 MG/DL (ref 8.5–10.1)
CHLORIDE BLD-SCNC: 103 MMOL/L (ref 94–109)
CHOLEST SERPL-MCNC: 201 MG/DL
CO2 SERPL-SCNC: 25 MMOL/L (ref 20–32)
CREAT SERPL-MCNC: 0.72 MG/DL (ref 0.52–1.04)
CREAT UR-MCNC: 156 MG/DL
FASTING STATUS PATIENT QL REPORTED: YES
GFR SERPL CREATININE-BSD FRML MDRD: >90 ML/MIN/1.73M2
GLUCOSE BLD-MCNC: 148 MG/DL (ref 70–99)
HDLC SERPL-MCNC: 47 MG/DL
LDLC SERPL CALC-MCNC: 96 MG/DL
MICROALBUMIN UR-MCNC: 64 MG/L
MICROALBUMIN/CREAT UR: 41.03 MG/G CR (ref 0–25)
NONHDLC SERPL-MCNC: 154 MG/DL
POTASSIUM BLD-SCNC: 3.9 MMOL/L (ref 3.4–5.3)
SODIUM SERPL-SCNC: 134 MMOL/L (ref 133–144)
TRIGL SERPL-MCNC: 291 MG/DL

## 2023-03-20 PROCEDURE — 99214 OFFICE O/P EST MOD 30 MIN: CPT | Mod: 25 | Performed by: FAMILY MEDICINE

## 2023-03-20 PROCEDURE — 80048 BASIC METABOLIC PNL TOTAL CA: CPT | Performed by: FAMILY MEDICINE

## 2023-03-20 PROCEDURE — 82043 UR ALBUMIN QUANTITATIVE: CPT | Performed by: FAMILY MEDICINE

## 2023-03-20 PROCEDURE — 82570 ASSAY OF URINE CREATININE: CPT | Performed by: FAMILY MEDICINE

## 2023-03-20 PROCEDURE — 99396 PREV VISIT EST AGE 40-64: CPT | Performed by: FAMILY MEDICINE

## 2023-03-20 PROCEDURE — 80061 LIPID PANEL: CPT | Performed by: FAMILY MEDICINE

## 2023-03-20 PROCEDURE — 36415 COLL VENOUS BLD VENIPUNCTURE: CPT | Performed by: FAMILY MEDICINE

## 2023-03-20 RX ORDER — LEVONORGESTREL AND ETHINYL ESTRADIOL 0.15-0.03
KIT ORAL
Qty: 112 TABLET | Refills: 3 | Status: SHIPPED | OUTPATIENT
Start: 2023-03-20 | End: 2024-01-22

## 2023-03-20 RX ORDER — LOSARTAN POTASSIUM 100 MG/1
100 TABLET ORAL DAILY
Qty: 90 TABLET | Refills: 1 | Status: SHIPPED | OUTPATIENT
Start: 2023-03-20 | End: 2023-09-25

## 2023-03-20 RX ORDER — DULAGLUTIDE 3 MG/.5ML
3 INJECTION, SOLUTION SUBCUTANEOUS
Qty: 4 ML | Refills: 1 | Status: SHIPPED | OUTPATIENT
Start: 2023-03-03 | End: 2023-06-30

## 2023-03-20 RX ORDER — PAROXETINE HYDROCHLORIDE HEMIHYDRATE 25 MG/1
50 TABLET, FILM COATED, EXTENDED RELEASE ORAL DAILY
Qty: 180 TABLET | Refills: 1 | Status: SHIPPED | OUTPATIENT
Start: 2023-03-20 | End: 2023-09-25

## 2023-03-20 RX ORDER — METFORMIN HCL 500 MG
1000 TABLET, EXTENDED RELEASE 24 HR ORAL 2 TIMES DAILY WITH MEALS
Qty: 360 TABLET | Refills: 1 | Status: SHIPPED | OUTPATIENT
Start: 2023-03-20 | End: 2023-09-25

## 2023-03-20 RX ORDER — BUPROPION HYDROCHLORIDE 75 MG/1
TABLET ORAL
Qty: 90 TABLET | Refills: 1 | Status: SHIPPED | OUTPATIENT
Start: 2023-03-20 | End: 2023-09-25

## 2023-03-20 ASSESSMENT — ENCOUNTER SYMPTOMS
EYE PAIN: 0
JOINT SWELLING: 0
NAUSEA: 0
DYSURIA: 0
HEADACHES: 0
NERVOUS/ANXIOUS: 0
PALPITATIONS: 0
HEMATURIA: 0
CHILLS: 0
HEARTBURN: 0
DIZZINESS: 0
CONSTIPATION: 0
FREQUENCY: 0
ABDOMINAL PAIN: 0
SHORTNESS OF BREATH: 0
BREAST MASS: 0
PARESTHESIAS: 0
ARTHRALGIAS: 0
DIARRHEA: 0
HEMATOCHEZIA: 0
MYALGIAS: 0
COUGH: 1
FEVER: 0
WEAKNESS: 0
SORE THROAT: 0

## 2023-03-20 ASSESSMENT — PATIENT HEALTH QUESTIONNAIRE - PHQ9
10. IF YOU CHECKED OFF ANY PROBLEMS, HOW DIFFICULT HAVE THESE PROBLEMS MADE IT FOR YOU TO DO YOUR WORK, TAKE CARE OF THINGS AT HOME, OR GET ALONG WITH OTHER PEOPLE: NOT DIFFICULT AT ALL
SUM OF ALL RESPONSES TO PHQ QUESTIONS 1-9: 3
SUM OF ALL RESPONSES TO PHQ QUESTIONS 1-9: 3

## 2023-03-20 ASSESSMENT — PAIN SCALES - GENERAL: PAINLEVEL: NO PAIN (0)

## 2023-03-20 NOTE — PROGRESS NOTES
SUBJECTIVE:   CC: Shanta is an 43 year old who presents for preventive health visit.     Patient has been advised of split billing requirements and indicates understanding: Yes  Healthy Habits:     Getting at least 3 servings of Calcium per day:  Yes    Bi-annual eye exam:  Yes    Dental care twice a year:  Yes    Sleep apnea or symptoms of sleep apnea:  Daytime drowsiness    Diet:  Diabetic    Frequency of exercise:  1 day/week    Duration of exercise:  Less than 15 minutes    Taking medications regularly:  No    Barriers to taking medications:  None    Medication side effects:  None    PHQ-2 Total Score: 0    Additional concerns today:  No              Today's PHQ-2 Score:   PHQ-2 ( 1999 [employer]) 3/13/2023   Q1: Little interest or pleasure in doing things 0   Q2: Feeling down, depressed or hopeless 0   PHQ-2 Score 0   PHQ-2 Total Score (12-17 Years)- Positive if 3 or more points; Administer PHQ-A if positive -   Q1: Little interest or pleasure in doing things Not at all   Q2: Feeling down, depressed or hopeless Not at all   PHQ-2 Score 0     Answers for HPI/ROS submitted by the patient on 3/20/2023  If you checked off any problems, how difficult have these problems made it for you to do your work, take care of things at home, or get along with other people?: Not difficult at all  PHQ9 TOTAL SCORE: 3          Social History     Tobacco Use     Smoking status: Never     Smokeless tobacco: Never     Tobacco comments:     Nonsmoking household   Substance Use Topics     Alcohol use: No     Comment: Minimal         Alcohol Use 3/13/2023   Prescreen: >3 drinks/day or >7 drinks/week? No       Reviewed orders with patient.  Reviewed health maintenance and updated orders accordingly - Yes    G0  P 0   Patient's last menstrual period was 11/13/2022.     Fasting: Yes    Td: tdap 2/2020       Flu: 11/2022      Covid: Pf boost#1 12/2021      Shingrix: NA      PPV: done      Last 3 Pap and HPV Results:   PAP / HPV Latest Ref  Rng & Units 2/24/2020 1/6/2015 12/16/2011   PAP (Historical) - NIL NIL NIL   HPV16 NEG:Negative Negative - -   HPV18 NEG:Negative Negative - -   HRHPV NEG:Negative Negative - -                  Cholesterol:   Lab Results   Component Value Date    CHOL 228 03/03/2022    CHOL 247 11/11/2020     Lab Results   Component Value Date    HDL 52 03/03/2022    HDL 49 11/11/2020     Lab Results   Component Value Date     03/03/2022     11/11/2020     Lab Results   Component Value Date    TRIG 196 03/03/2022    TRIG 328 11/11/2020     Lab Results   Component Value Date    CHOLHDLRATIO 4.2 11/14/2011         MMG: 3/2023  Dexa:  NA     Flex/colo: discussed      Seat Belt: Yes    Sunscreen use: Yes   Calcium Intake: adeq  Health Care Directive: Yes   Sexually Active: Yes     Current contraception: oral contraceptives  History of abnormal Pap smear: Yes: see pmh  Family history of colon/breast/ovarian cancer: No  Regular self breast exam: Yes  History of abnormal mammogram: No      Labs reviewed in EPIC  BP Readings from Last 3 Encounters:   03/20/23 (!) 144/94   10/10/22 132/87   03/03/22 128/84    Wt Readings from Last 3 Encounters:   03/20/23 79.8 kg (176 lb)   10/10/22 80.2 kg (176 lb 12.8 oz)   03/03/22 79.9 kg (176 lb 3.2 oz)                  Patient Active Problem List   Diagnosis     CARDIOVASCULAR SCREENING; LDL GOAL LESS THAN 160     Anxiety     Advanced directives, counseling/discussion     Myopia     Controlled type 2 diabetes mellitus without complication, without long-term current use of insulin (H)     Major depressive disorder, recurrent episode, mild (H)     Hypertension goal BP (blood pressure) < 140/90     Past Surgical History:   Procedure Laterality Date     ABDOMEN SURGERY       APPENDE[facility]OM[facility]  5/19/12    [facility]     EXAM OF VAGINA,COLPOSCOPY  9/20/06    Benign     HC TOOTH EXTRACTION W/FORCEP       TONSILLECTOMY & ADENOIDECTOMY         Social History     Tobacco Use     Smoking status:  Never     Smokeless tobacco: Never     Tobacco comments:     Nonsmoking household   Substance Use Topics     Alcohol use: No     Comment: Minimal     Family History   Problem Relation Age of Onset     Cancer Mother         lymphoma     Other Cancer Mother         1994-current     Lipids Father      Hyperlipidemia Father      Bladder Cancer Father      Coronary Artery Disease Father         Cause of death     Other Cancer Father         Bladder     Heart Disease Maternal Grandfather         70s     Cancer - colorectal Paternal Grandmother         80s     Glaucoma Paternal Grandmother      Anesthesia Reaction Other         lung problems during appendectomy         Current Outpatient Medications   Medication Sig Dispense Refill     buPROPion (WELLBUTRIN) 75 MG tablet TAKE 1 TABLET(75 MG) BY MOUTH AT BEDTIME Strength: 75 mg 90 tablet 1     Dulaglutide (TRULICITY) 3 MG/0.5ML SOPN Inject 3 mg Subcutaneous every 7 days Replaces previous order sent 3/3/2023, refill added 4 mL 1     IRON PO Take by mouth daily       levonorgestrel-ethinyl estradiol (QUASENSE) 0.15-0.03 MG tablet Skip week 4 of packs 1-3, then take week 4 of pack 4 112 tablet 3     losartan (COZAAR) 100 MG tablet Take 1 tablet (100 mg) by mouth daily 90 tablet 1     metFORMIN (GLUCOPHAGE XR) 500 MG 24 hr tablet Take 2 tablets (1,000 mg) by mouth 2 times daily (with meals) 360 tablet 1     Multiple Vitamins-Minerals (MULTIVITAMIN PO) Take 1 tablet by mouth daily       PARoxetine (PAXIL-CR) 25 MG 24 hr tablet Take 2 tablets (50 mg) by mouth daily 180 tablet 1     STATIN NOT PRESCRIBED (INTENTIONAL) Please choose reason not prescribed from choices below.       valACYclovir (VALTREX) 1000 mg tablet          Breast Cancer Screening:    Breast CA Risk Assessment (FHS-7) 2/27/2021   Do you have a family history of breast, colon, or ovarian cancer? No / Unknown         Mammogram Screening: Recommended annual mammography  Pertinent mammograms are reviewed under the  "imaging tab.      Reviewed and updated as needed this visit by clinical staff   Tobacco  Allergies  Meds  Problems  Med Hx  Surg Hx  Fam Hx          Reviewed and updated as needed this visit by Provider   Tobacco  Allergies  Meds  Problems  Med Hx  Surg Hx  Fam Hx             Review of Systems   Constitutional: Negative for chills and fever.   HENT: Negative for congestion, ear pain, hearing loss and sore throat.    Eyes: Negative for pain and visual disturbance.   Respiratory: Positive for cough. Negative for shortness of breath.    Cardiovascular: Negative for chest pain, palpitations and peripheral edema.   Gastrointestinal: Negative for abdominal pain, constipation, diarrhea, heartburn, hematochezia and nausea.   Breasts:  Negative for tenderness, breast mass and discharge.   Genitourinary: Negative for dysuria, frequency, genital sores, hematuria, pelvic pain, urgency, vaginal bleeding and vaginal discharge.   Musculoskeletal: Negative for arthralgias, joint swelling and myalgias.   Skin: Negative for rash.   Neurological: Negative for dizziness, weakness, headaches and paresthesias.   Psychiatric/Behavioral: Negative for mood changes. The patient is not nervous/anxious.           OBJECTIVE:   BP (!) 144/94   Pulse 94   Temp 98.6  F (37  C) (Oral)   Resp 18   Ht 1.6 m (5' 3\")   Wt 79.8 kg (176 lb)   LMP 11/13/2022   SpO2 98%   BMI 31.18 kg/m    Physical Exam  GENERAL: healthy, alert and no distress  EYES: Eyes grossly normal to inspection, PERRL and conjunctivae and sclerae normal  HENT: ear canals and TM's normal, nose and mouth without ulcers or lesions  NECK: no adenopathy, no asymmetry, masses, or scars and thyroid normal to palpation  RESP: lungs clear to auscultation - no rales, rhonchi or wheezes  BREAST: normal without masses, tenderness or nipple discharge and no palpable axillary masses or adenopathy  CV: regular rate and rhythm, normal S1 S2, no S3 or S4, no murmur, click or " rub, no peripheral edema and peripheral pulses strong  ABDOMEN: soft, nontender, no hepatosplenomegaly, no masses and bowel sounds normal  MS: no gross musculoskeletal defects noted, no edema  SKIN: no suspicious lesions or rashes  NEURO: Normal strength and tone, mentation intact and speech normal  PSYCH: mentation appears normal, affect normal/bright  Diabetic foot exam: normal DP and PT pulses, no trophic changes or ulcerative lesions, normal sensory exam and normal monofilament exam    Diagnostic Test Results:  Labs reviewed in Epic    ASSESSMENT/PLAN:   (Z00.00) Routine general medical examination at a health care facility  (primary encounter diagnosis)  Comment: preventive needs reviewed   Plan: levonorgestrel-ethinyl estradiol (QUASENSE)         0.15-0.03 MG tablet        see orders in Epic.     (E11.9) Controlled type 2 diabetes mellitus without complication, without long-term current use of insulin (H)  Comment: a1c to goal   Plan: Lipid panel reflex to direct LDL Non-fasting,         Albumin Random Urine Quantitative with Creat         Ratio, BASIC METABOLIC PANEL, metFORMIN         (GLUCOPHAGE XR) 500 MG 24 hr tablet,         Dulaglutide (TRULICITY) 3 MG/0.5ML SOPN        Continue current meds,  Refill x 6 months  Monitor glucose and send home readings via [employer]   Consider mounjaro if not to goal  Recheck in 3 month - a1c    (F33.0) Major depressive disorder, recurrent episode, mild (H)  (F41.9) Anxiety  Comment: controlled  Plan: buPROPion (WELLBUTRIN) 75 MG tablet, PARoxetine        (PAXIL-CR) 25 MG 24 hr tablet         Refill x 6 months     (I10) Hypertension goal BP (blood pressure) < 140/90  Comment: not to goal  Plan: losartan (COZAAR) 100 MG tablet        Adjust med to 100 mg daily from 50 mg  Follow-up 2 weeks for bp check            COUNSELING:  Reviewed preventive health counseling, as reflected in patient instructions  Special attention given to:        Regular exercise       Healthy  diet/nutrition        She reports that she has never smoked. She has never used smokeless tobacco.          Katy Moore MD  [facility]

## 2023-03-20 NOTE — PATIENT INSTRUCTIONS
Preventive Health Recommendations  Female Ages 40 to 49    Yearly exam:   See your health care provider every year in order to  Review health changes.   Discuss preventive care.    Review your medicines if your doctor prescribed any.    Get a Pap test every three years (unless you have an abnormal result and your provider advises testing more often).    If you get Pap tests with HPV test, you only need to test every 5 years, unless you have an abnormal result. You do not need a Pap test if your uterus was removed (hysterectomy) and you have not had cancer.    You should be tested each year for STDs (sexually transmitted diseases), if you're at risk.   Ask your doctor if you should have a mammogram.    Have a colonoscopy (test for colon cancer) if someone in your family has had colon cancer or polyps before age 50.     Have a cholesterol test every 5 years.     Have a diabetes test (fasting glucose) after age 45. If you are at risk for diabetes, you should have this test every 3 years.    Shots: Get a flu shot each year. Get a tetanus shot every 10 years.     Nutrition:   Eat at least 5 servings of fruits and vegetables each day.  Eat whole-grain bread, whole-wheat pasta and brown rice instead of white grains and rice.  Get adequate Calcium and Vitamin D.      Lifestyle  Exercise at least 150 minutes a week (an average of 30 minutes a day, 5 days a week). This will help you control your weight and prevent disease.  Limit alcohol to one drink per day.  No smoking.   Wear sunscreen to prevent skin cancer.  See your dentist every six months for an exam and cleaning.    Once feeling better, start checking sugars regularly and send in reading to Katy Moore MD via Backplane       Guidelines for colon cancer have changed and it is now recommended to start screening at age 45.  Please check with your insurance that colon cancer screening is covered at age 45 and we can discuss your options at your visit.  Your choices are  as follows:  Colonoscopy - A colonoscopy is a procedure that is performed to see the inside of the rectum by using a long, thin, and flexible tube with a tiny video camera and light at the end. This test is done every 10 years if it is normal.  Cologuard stool card -  may be completed at home. Cologuard uses a DNA marker in your stool to detect colon cancer and some precancerous polyps. It is mailed to your home, completed and mailed back. This test is to be completed every three years.  FIT stool card -  may be picked up at our lab,completed at home then returned to our lab. This test is to be completed every year.    Let me know your preference,  Katy Moore MD

## 2023-04-03 ENCOUNTER — TELEPHONE (OUTPATIENT)
Dept: FAMILY MEDICINE | Facility: CLINIC | Age: 43
End: 2023-04-03
Payer: COMMERCIAL

## 2023-04-03 NOTE — TELEPHONE ENCOUNTER
Patient Quality Outreach    Patient is due for the following:   Diabetes -  A1C and BP Check  Hypertension -  BP check  Colon Cancer Screening      Topic Date Due     COVID-19 Vaccine (4 - Booster for Pfizer series) 01/27/2022       Next Steps:   Schedule a nurse only visit for Blood Pressure Check     Type of outreach:    Sent Pens[employer] message.    Next Steps:  Reach out within 90 days via Letter.    Max number of attempts reached: No. Will try again in 90 days if patient still on fail list.    Questions for provider review:    None     Brent YANG LPN  [facility]   04/03/23 at 11:23 AM

## 2023-05-27 ENCOUNTER — HEALTH MAINTENANCE LETTER (OUTPATIENT)
Age: 43
End: 2023-05-27

## 2023-06-28 ENCOUNTER — MYC MEDICAL ADVICE (OUTPATIENT)
Dept: FAMILY MEDICINE | Facility: CLINIC | Age: 43
End: 2023-06-28
Payer: COMMERCIAL

## 2023-06-28 DIAGNOSIS — E11.9 CONTROLLED TYPE 2 DIABETES MELLITUS WITHOUT COMPLICATION, WITHOUT LONG-TERM CURRENT USE OF INSULIN (H): ICD-10-CM

## 2023-07-06 ENCOUNTER — ALLIED HEALTH/NURSE VISIT (OUTPATIENT)
Dept: FAMILY MEDICINE | Facility: CLINIC | Age: 43
End: 2023-07-06

## 2023-07-06 ENCOUNTER — LAB (OUTPATIENT)
Dept: LAB | Facility: CLINIC | Age: 43
End: 2023-07-06
Payer: COMMERCIAL

## 2023-07-06 VITALS — DIASTOLIC BLOOD PRESSURE: 78 MMHG | SYSTOLIC BLOOD PRESSURE: 118 MMHG

## 2023-07-06 DIAGNOSIS — Z01.30 BP CHECK: Primary | ICD-10-CM

## 2023-07-06 DIAGNOSIS — E11.9 CONTROLLED TYPE 2 DIABETES MELLITUS WITHOUT COMPLICATION, WITHOUT LONG-TERM CURRENT USE OF INSULIN (H): ICD-10-CM

## 2023-07-06 LAB — HBA1C MFR BLD: 8.3 % (ref 0–5.6)

## 2023-07-06 PROCEDURE — 36415 COLL VENOUS BLD VENIPUNCTURE: CPT

## 2023-07-06 PROCEDURE — 83036 HEMOGLOBIN GLYCOSYLATED A1C: CPT

## 2023-07-06 PROCEDURE — 99207 PR NO CHARGE NURSE ONLY: CPT | Performed by: FAMILY MEDICINE

## 2023-07-06 NOTE — PROGRESS NOTES
Radha Wilson was evaluated at [facility] on July 6, 2023 at which time her blood pressure was:    BP Readings from Last 1 Encounters:   07/06/23 118/78     No data recorded      Reviewed lifestyle modifications for blood pressure control and reduction: including making healthy food choices, managing weight, getting regular exercise, smoking cessation, reducing alcohol consumption, monitoring blood pressure regularly.     Symptoms: None    BP Goal:< 140/90 mmHg    BP Assessment:  BP at goal    Potential Reasons for BP too high: NA - Not applicable    BP Follow-Up Plan: Recheck BP in 6 months at pharmacy    Recommendation to Provider: none     Note completed by: Sena Ramos RPH

## 2023-07-25 ENCOUNTER — MYC REFILL (OUTPATIENT)
Dept: FAMILY MEDICINE | Facility: CLINIC | Age: 43
End: 2023-07-25
Payer: COMMERCIAL

## 2023-07-25 DIAGNOSIS — E11.9 CONTROLLED TYPE 2 DIABETES MELLITUS WITHOUT COMPLICATION, WITHOUT LONG-TERM CURRENT USE OF INSULIN (H): ICD-10-CM

## 2023-07-29 DIAGNOSIS — E11.9 CONTROLLED TYPE 2 DIABETES MELLITUS WITHOUT COMPLICATION, WITHOUT LONG-TERM CURRENT USE OF INSULIN (H): ICD-10-CM

## 2023-07-30 RX ORDER — DULAGLUTIDE 1.5 MG/.5ML
INJECTION, SOLUTION SUBCUTANEOUS
Qty: 6 ML | Refills: 1 | OUTPATIENT
Start: 2023-07-30

## 2023-08-21 ENCOUNTER — MYC MEDICAL ADVICE (OUTPATIENT)
Dept: FAMILY MEDICINE | Facility: CLINIC | Age: 43
End: 2023-08-21
Payer: COMMERCIAL

## 2023-08-21 DIAGNOSIS — Z12.11 SPECIAL SCREENING FOR MALIGNANT NEOPLASMS, COLON: Primary | ICD-10-CM

## 2023-08-21 NOTE — TELEPHONE ENCOUNTER
Thank you for reaching out!  The patient has an open access plan and can be seen by a provider of their choice.  Please enter the referral into [facility].    Thank you,     [facility]

## 2023-08-23 NOTE — TELEPHONE ENCOUNTER
Order has been faxed, appears to be for [facility].  My chart message sent to patient this has been done.    VITAL SIGNS: I have reviewed nursing notes and confirm.  CONSTITUTIONAL: elderly and frail  SKIN: Skin exam is warm and dry, no acute rash.  HEAD: Normocephalic; atraumatic.  EYES: PERRL, EOM intact; conjunctiva and sclera clear.  ENT: No nasal discharge; airway clear.   NECK: Supple; non tender.  CARD:+ S1, S2   RESP: No wheezes, rales or rhonchi.  ABD: Normal bowel sounds; soft; non-distended; non-tender;  EXT: Normal ROM. No cyanosis or edema.  LYMPH: No acute adenopathy.  NEURO: Alert. Grossly unremarkable. No focal deficits.  PSYCH: Cooperative, appropriate.

## 2023-09-06 NOTE — NURSING NOTE
"Chief Complaint   Patient presents with     Physical       Initial BP (!) 146/85   Pulse 96   Temp 97.6  F (36.4  C) (Oral)   Resp 20   Ht 1.613 m (5' 3.5\")   Wt 82.9 kg (182 lb 12.8 oz)   LMP 12/01/2020   BMI 31.87 kg/m   Estimated body mass index is 31.87 kg/m  as calculated from the following:    Height as of this encounter: 1.613 m (5' 3.5\").    Weight as of this encounter: 82.9 kg (182 lb 12.8 oz).  Medication Reconciliation: complete  Harris Cheung CMA    " Never (2) cough or sneeze

## 2023-09-23 ASSESSMENT — ANXIETY QUESTIONNAIRES
IF YOU CHECKED OFF ANY PROBLEMS ON THIS QUESTIONNAIRE, HOW DIFFICULT HAVE THESE PROBLEMS MADE IT FOR YOU TO DO YOUR WORK, TAKE CARE OF THINGS AT HOME, OR GET ALONG WITH OTHER PEOPLE: NOT DIFFICULT AT ALL
6. BECOMING EASILY ANNOYED OR IRRITABLE: NOT AT ALL
2. NOT BEING ABLE TO STOP OR CONTROL WORRYING: NOT AT ALL
3. WORRYING TOO MUCH ABOUT DIFFERENT THINGS: NOT AT ALL
GAD7 TOTAL SCORE: 0
4. TROUBLE RELAXING: NOT AT ALL
GAD7 TOTAL SCORE: 0
7. FEELING AFRAID AS IF SOMETHING AWFUL MIGHT HAPPEN: NOT AT ALL
1. FEELING NERVOUS, ANXIOUS, OR ON EDGE: NOT AT ALL
8. IF YOU CHECKED OFF ANY PROBLEMS, HOW DIFFICULT HAVE THESE MADE IT FOR YOU TO DO YOUR WORK, TAKE CARE OF THINGS AT HOME, OR GET ALONG WITH OTHER PEOPLE?: NOT DIFFICULT AT ALL
5. BEING SO RESTLESS THAT IT IS HARD TO SIT STILL: NOT AT ALL
7. FEELING AFRAID AS IF SOMETHING AWFUL MIGHT HAPPEN: NOT AT ALL

## 2023-09-25 ENCOUNTER — OFFICE VISIT (OUTPATIENT)
Dept: FAMILY MEDICINE | Facility: CLINIC | Age: 43
End: 2023-09-25
Payer: COMMERCIAL

## 2023-09-25 VITALS
SYSTOLIC BLOOD PRESSURE: 133 MMHG | HEART RATE: 101 BPM | RESPIRATION RATE: 16 BRPM | WEIGHT: 178.8 LBS | HEIGHT: 63 IN | BODY MASS INDEX: 31.68 KG/M2 | TEMPERATURE: 98.9 F | DIASTOLIC BLOOD PRESSURE: 76 MMHG | OXYGEN SATURATION: 98 %

## 2023-09-25 DIAGNOSIS — F41.9 ANXIETY: ICD-10-CM

## 2023-09-25 DIAGNOSIS — I10 HYPERTENSION GOAL BP (BLOOD PRESSURE) < 140/90: ICD-10-CM

## 2023-09-25 DIAGNOSIS — F33.0 MAJOR DEPRESSIVE DISORDER, RECURRENT EPISODE, MILD (H): ICD-10-CM

## 2023-09-25 DIAGNOSIS — E11.9 CONTROLLED TYPE 2 DIABETES MELLITUS WITHOUT COMPLICATION, WITHOUT LONG-TERM CURRENT USE OF INSULIN (H): Primary | ICD-10-CM

## 2023-09-25 DIAGNOSIS — Z12.11 SPECIAL SCREENING FOR MALIGNANT NEOPLASMS, COLON: ICD-10-CM

## 2023-09-25 LAB
ANION GAP SERPL CALCULATED.3IONS-SCNC: 14 MMOL/L (ref 7–15)
BUN SERPL-MCNC: 10.3 MG/DL (ref 6–20)
CALCIUM SERPL-MCNC: 8.9 MG/DL (ref 8.6–10)
CHLORIDE SERPL-SCNC: 102 MMOL/L (ref 98–107)
CREAT SERPL-MCNC: 0.72 MG/DL (ref 0.51–0.95)
CREAT UR-MCNC: 136 MG/DL
DEPRECATED HCO3 PLAS-SCNC: 17 MMOL/L (ref 22–29)
EGFRCR SERPLBLD CKD-EPI 2021: >90 ML/MIN/1.73M2
GLUCOSE SERPL-MCNC: 281 MG/DL (ref 70–99)
HBA1C MFR BLD: 8.1 % (ref 0–5.6)
MICROALBUMIN UR-MCNC: <12 MG/L
MICROALBUMIN/CREAT UR: NORMAL MG/G{CREAT}
POTASSIUM SERPL-SCNC: 4.2 MMOL/L (ref 3.4–5.3)
SODIUM SERPL-SCNC: 133 MMOL/L (ref 136–145)

## 2023-09-25 PROCEDURE — 80048 BASIC METABOLIC PNL TOTAL CA: CPT | Performed by: FAMILY MEDICINE

## 2023-09-25 PROCEDURE — 83036 HEMOGLOBIN GLYCOSYLATED A1C: CPT | Performed by: FAMILY MEDICINE

## 2023-09-25 PROCEDURE — 82570 ASSAY OF URINE CREATININE: CPT | Performed by: FAMILY MEDICINE

## 2023-09-25 PROCEDURE — 82043 UR ALBUMIN QUANTITATIVE: CPT | Performed by: FAMILY MEDICINE

## 2023-09-25 PROCEDURE — 99214 OFFICE O/P EST MOD 30 MIN: CPT | Performed by: FAMILY MEDICINE

## 2023-09-25 PROCEDURE — 36415 COLL VENOUS BLD VENIPUNCTURE: CPT | Performed by: FAMILY MEDICINE

## 2023-09-25 RX ORDER — LOSARTAN POTASSIUM 100 MG/1
100 TABLET ORAL DAILY
Qty: 90 TABLET | Refills: 1 | Status: SHIPPED | OUTPATIENT
Start: 2023-09-25 | End: 2024-03-19

## 2023-09-25 RX ORDER — BUPROPION HYDROCHLORIDE 75 MG/1
TABLET ORAL
Qty: 90 TABLET | Refills: 1 | Status: SHIPPED | OUTPATIENT
Start: 2023-09-25 | End: 2024-03-19

## 2023-09-25 RX ORDER — PAROXETINE HYDROCHLORIDE HEMIHYDRATE 25 MG/1
50 TABLET, FILM COATED, EXTENDED RELEASE ORAL DAILY
Qty: 180 TABLET | Refills: 1 | Status: SHIPPED | OUTPATIENT
Start: 2023-09-25 | End: 2024-02-11

## 2023-09-25 RX ORDER — METFORMIN HCL 500 MG
1000 TABLET, EXTENDED RELEASE 24 HR ORAL 2 TIMES DAILY WITH MEALS
Qty: 360 TABLET | Refills: 1 | Status: SHIPPED | OUTPATIENT
Start: 2023-09-25 | End: 2024-03-19

## 2023-09-25 ASSESSMENT — PATIENT HEALTH QUESTIONNAIRE - PHQ9
SUM OF ALL RESPONSES TO PHQ QUESTIONS 1-9: 2
SUM OF ALL RESPONSES TO PHQ QUESTIONS 1-9: 2
10. IF YOU CHECKED OFF ANY PROBLEMS, HOW DIFFICULT HAVE THESE PROBLEMS MADE IT FOR YOU TO DO YOUR WORK, TAKE CARE OF THINGS AT HOME, OR GET ALONG WITH OTHER PEOPLE: NOT DIFFICULT AT ALL

## 2023-09-25 ASSESSMENT — PAIN SCALES - GENERAL: PAINLEVEL: NO PAIN (0)

## 2023-09-25 NOTE — PATIENT INSTRUCTIONS
Work on going to the gym more often.      Trial of mounjaro instead of trulicity.  Notify Katy Moore MD via [employer] (now [employer]) how you are doing.    No other medication changes.

## 2023-09-25 NOTE — PROGRESS NOTES
"  Assessment & Plan     (E11.9) Controlled type 2 diabetes mellitus without complication, without long-term current use of insulin (H)  (primary encounter diagnosis)  Comment: trending to goal A1C  Plan: Albumin Random Urine Quantitative with Creat         Ratio, HEMOGLOBIN A1C, tirzepatide (MOUNJARO)         2.5 MG/0.5ML pen, metFORMIN (GLUCOPHAGE XR) 500        MG 24 hr tablet        Trial of mounjaro to improve control.  Will see if covered  Return to trulicity 3 mg if not covered.  Recheck A1c in 3 months, lab only visit    (I10) Hypertension goal BP (blood pressure) < 140/90  Comment: to goal  Plan: BASIC METABOLIC PANEL, losartan (COZAAR) 100 MG        tablet        Continue losartan, refills x 6 months    (F33.0) Major depressive disorder, recurrent episode, mild (H)  (F41.9) Anxiety  Comment: stable  Plan: buPROPion (WELLBUTRIN) 75 MG tablet, PARoxetine        (PAXIL-CR) 25 MG 24 hr tablet         Refill x 6 months     (Z12.11) Special screening for malignant neoplasms, colon  Comment: due  Plan: Colonoscopy Screening  Referral                     BMI:   Estimated body mass index is 31.67 kg/m  as calculated from the following:    Height as of this encounter: 1.6 m (5' 3\").    Weight as of this encounter: 81.1 kg (178 lb 12.8 oz).   Weight management plan: Discussed healthy diet and exercise guidelines    See Patient Instructions    Katy Moore MD  [facility]   Shanta is a 43 year old, presenting for the following health issues:  Diabetes      9/25/2023    11:16 AM   Additional Questions   Roomed by Harris       History of Present Illness       Mental Health Follow-up:  Patient presents to follow-up on Depression & Anxiety.Patient's depression since last visit has been:  Good  The patient is not having other symptoms associated with depression.  Patient's anxiety since last visit has been:  Good  The patient is not having other symptoms associated with " anxiety.  Any significant life events: financial concerns  Patient is not feeling anxious or having panic attacks.  Patient has no concerns about alcohol or drug use.    Diabetes:   She presents for follow up of diabetes.  She is checking home blood glucose a few times a month.   She checks blood glucose before meals.  Blood glucose is never over 200 and never under 70. She is aware of hypoglycemia symptoms including shakiness, dizziness, weakness, blurred vision and confusion.    She has no concerns regarding her diabetes at this time.   She is not experiencing numbness or burning in feet, excessive thirst, blurry vision, weight changes or redness, sores or blisters on feet. The patient has not had a diabetic eye exam in the last 12 months.          Hypertension: She presents for follow up of hypertension.  She does check blood pressure  regularly outside of the clinic. Outpatient blood pressures have not been over 140/90. She does not follow a low salt diet.     She eats 0-1 servings of fruits and vegetables daily.She consumes 0 sweetened beverage(s) daily.She exercises with enough effort to increase her heart rate 10 to 19 minutes per day.  She exercises with enough effort to increase her heart rate 4 days per week.   She is taking medications regularly.     Working on diet. Tolerating trulicity at 3 mg weekly. Weight trending up despite trulicity.  Pt admits to 1 workout per week. Willing to try to add more.  Tolerating all other meds well.                BP Readings from Last 3 Encounters:   09/25/23 133/76   07/06/23 118/78   03/20/23 (!) 144/94    Wt Readings from Last 3 Encounters:   09/25/23 81.1 kg (178 lb 12.8 oz)   03/20/23 79.8 kg (176 lb)   10/10/22 80.2 kg (176 lb 12.8 oz)                  Patient Active Problem List   Diagnosis    CARDIOVASCULAR SCREENING; LDL GOAL LESS THAN 160    Anxiety    Advanced directives, counseling/discussion    Myopia    Controlled type 2 diabetes mellitus without  complication, without long-term current use of insulin (H)    Major depressive disorder, recurrent episode, mild (H24)    Hypertension goal BP (blood pressure) < 140/90     Past Surgical History:   Procedure Laterality Date    ABDOMEN SURGERY      APPENDECTOMY  5/19/12    [facility]    EXAM OF VAGINA,COLPOSCOPY  9/20/06    Benign    HC TOOTH EXTRACTION W/FORCEP      TONSILLECTOMY & ADENOIDECTOMY         Social History     Tobacco Use    Smoking status: Never    Smokeless tobacco: Never    Tobacco comments:     Nonsmoking household   Substance Use Topics    Alcohol use: No     Comment: Minimal     Family History   Problem Relation Age of Onset    Cancer Mother         lymphoma    Other Cancer Mother         1994-current    Lipids Father     Hyperlipidemia Father     Bladder Cancer Father     Coronary Artery Disease Father         Cause of death    Other Cancer Father         Bladder    Heart Disease Maternal Grandfather         70s    Cancer - colorectal Paternal Grandmother         80s    Glaucoma Paternal Grandmother     Anesthesia Reaction Other         lung problems during appendectomy         Current Outpatient Medications   Medication Sig Dispense Refill    buPROPion (WELLBUTRIN) 75 MG tablet TAKE 1 TABLET(75 MG) BY MOUTH AT BEDTIME Strength: 75 mg 90 tablet 1    IRON PO Take by mouth daily      levonorgestrel-ethinyl estradiol (QUASENSE) 0.15-0.03 MG tablet Skip week 4 of packs 1-3, then take week 4 of pack 4 112 tablet 3    losartan (COZAAR) 100 MG tablet Take 1 tablet (100 mg) by mouth daily 90 tablet 1    metFORMIN (GLUCOPHAGE XR) 500 MG 24 hr tablet Take 2 tablets (1,000 mg) by mouth 2 times daily (with meals) 360 tablet 1    Multiple Vitamins-Minerals (MULTIVITAMIN PO) Take 1 tablet by mouth daily      PARoxetine (PAXIL-CR) 25 MG 24 hr tablet Take 2 tablets (50 mg) by mouth daily 180 tablet 1    STATIN NOT PRESCRIBED (INTENTIONAL) Please choose reason not prescribed from choices below.       "tirzepatide (MOUNJARO) 2.5 MG/0.5ML pen Inject 2.5 mg Subcutaneous every 7 days 2 mL 0    valACYclovir (VALTREX) 1000 mg tablet        Recent Labs   Lab Test 09/25/23  1132 07/06/23  1153 03/20/23  0806 01/10/23  1129 04/08/22  1005 03/03/22  1118 12/29/21  1047 09/14/21  1039 06/11/21  1031 02/12/21  1354 11/11/20  0958 02/24/20  1032 08/26/19  0730 07/28/17  1053 07/21/17  1014   A1C 8.1* 8.3*  --  7.5*   < >  --    < > 8.1*   < > 8.8* 8.3*   < > 7.2*   < >  --    LDL  --   --  96  --   --  137*  --  100  --   --  132*   < >  --    < > 128  Test canceled after completion  CORRECTED ON 07/24 AT 1113: PREVIOUSLY REPORTED  Above desirable:  100 129   mg/dl Borderline High:  130 159 mg/dL High:             160 189 mg/dL Very   high:       >189 mg/dl     HDL  --   --  47*  --   --  52  --  52  --   --  49*   < >  --    < > 46*  Test canceled after completion  CORRECTED ON 07/24 AT 1113: PREVIOUSLY REPORTED AS 46     TRIG  --   --  291*  --   --  196*  --  355*  --   --  328*   < >  --    < > 319*  Test canceled after completion  CORRECTED ON 07/24 AT 1113: PREVIOUSLY REPORTED  Borderline high:  150 199   mg/dl High:             200 499 mg/dl Very high:       >499 mg/dl     CR 0.72  --  0.72  --    < > 0.68  --  0.75   < > 0.67 0.71   < >  --    < >  --    GFRESTIMATED >90  --  >90  --    < > >90  --  >90   < > >90 >90   < >  --    < >  --    GFRESTBLACK  --   --   --   --   --   --   --   --   --  >90 >90   < >  --    < >  --    POTASSIUM 4.2  --  3.9  --    < > 4.0  --  4.6   < > 4.1 4.2   < >  --    < >  --    TSH  --   --   --   --   --   --   --   --   --   --   --   --  1.73  --  2.35    < > = values in this interval not displayed.         Review of Systems   Constitutional, HEENT, cardiovascular, pulmonary, gi and gu systems are negative, except as otherwise noted.      Objective    /76   Pulse 101   Temp 98.9  F (37.2  C) (Oral)   Resp 16   Ht 1.6 m (5' 3\")   Wt 81.1 kg (178 lb 12.8 oz) "   LMP  (LMP Unknown)   SpO2 98%   BMI 31.67 kg/m    Body mass index is 31.67 kg/m .  Physical Exam   GENERAL: healthy, alert and no distress  EYES: Eyes grossly normal to inspection, PERRL and conjunctivae and sclerae normal  RESP: lungs clear to auscultation - no rales, rhonchi or wheezes  CV: regular rate and rhythm, normal S1 S2, no S3 or S4, no murmur, click or rub, no peripheral edema and peripheral pulses strong  MS: no gross musculoskeletal defects noted, no edema  SKIN: no suspicious lesions or rashes  PSYCH: mentation appears normal, affect normal/bright                Patient states that she needs a referral for a colorectal cancer screening. Also needs a refill for the Wellbutrin and Paxil-Cr.

## 2023-10-15 DIAGNOSIS — I10 HYPERTENSION GOAL BP (BLOOD PRESSURE) < 140/90: ICD-10-CM

## 2023-10-15 RX ORDER — LOSARTAN POTASSIUM 100 MG/1
100 TABLET ORAL DAILY
Qty: 90 TABLET | Refills: 1 | OUTPATIENT
Start: 2023-10-15

## 2023-10-16 ENCOUNTER — MYC MEDICAL ADVICE (OUTPATIENT)
Dept: FAMILY MEDICINE | Facility: CLINIC | Age: 43
End: 2023-10-16
Payer: COMMERCIAL

## 2023-10-16 DIAGNOSIS — E11.9 CONTROLLED TYPE 2 DIABETES MELLITUS WITHOUT COMPLICATION, WITHOUT LONG-TERM CURRENT USE OF INSULIN (H): ICD-10-CM

## 2023-10-24 ENCOUNTER — TELEPHONE (OUTPATIENT)
Dept: GASTROENTEROLOGY | Facility: CLINIC | Age: 43
End: 2023-10-24
Payer: COMMERCIAL

## 2023-10-24 NOTE — TELEPHONE ENCOUNTER
"Endoscopy Scheduling Screen    Have you had a positive Covid test in the last 14 days?  No    Are you active on MyChart?   Yes    What insurance is in the chart?  Other:  [facility]    Ordering/Referring Provider: OLIMPIA HARMON   (If ordering provider performs procedure, schedule with ordering provider unless otherwise instructed. )    BMI: Estimated body mass index is 31.67 kg/m  as calculated from the following:    Height as of 9/25/23: 1.6 m (5' 3\").    Weight as of 9/25/23: 81.1 kg (178 lb 12.8 oz).     Sedation Ordered  moderate sedation.   If patient BMI > 50 do not schedule in [facility].    If patient BMI > 45 do not schedule at [facility].    Are you taking methadone or Suboxone?  No    Are you taking any prescription medications for pain 3 or more times per week?   No    Do you have a history of malignant hyperthermia or adverse reaction to anesthesia?  No    (Females) Are you currently pregnant?   No     Have you been diagnosed or told you have pulmonary hypertension?   No    Do you have an LVAD?  No    Have you been told you have moderate to severe sleep apnea?  No    Have you been told you have COPD, asthma, or any other lung disease?  No    Do you have any heart conditions?  No     Have you ever had an organ transplant?   No    Have you ever had or are you awaiting a heart or lung transplant?   No    Have you had a stroke or transient ischemic attack (TIA aka \"mini stroke\" in the last 6 months?   No    Have you been diagnosed with or been told you have cirrhosis of the liver?   No    Are you currently on dialysis?   No    Do you need assistance transferring?   No    BMI: Estimated body mass index is 31.67 kg/m  as calculated from the following:    Height as of 9/25/23: 1.6 m (5' 3\").    Weight as of 9/25/23: 81.1 kg (178 lb 12.8 oz).     Is patients BMI > 40 and scheduling location [facility]?  No    Do you take an injectable medication for weight loss or diabetes (excluding insulin)?  Yes, hold time can be up to 7 days. Please " check with you prescribing provider for recommendation.     Do you take the medication Naltrexone?  No    Do you take blood thinners?  No       Prep   Are you currently on dialysis or do you have chronic kidney disease?  No    Do you have a diagnosis of diabetes?  Yes (Golytely Prep)    Do you have a diagnosis of cystic fibrosis (CF)?  No    On a regular basis do you go 3 -5 days between bowel movements?  No    BMI > 40?  No    Preferred Pharmacy:    [id number] - [address] AT [facility] & [facility]  [address] [facility] [address]  Phone: [phone] Fax: [phone]      Final Scheduling Details   Colonoscopy prep sent?  Standard Golytely    Procedure scheduled  Colonoscopy    Surgeon:  TAVO     Date of procedure:  1/2/24     Pre-OP / PAC:   No - Not required for this site.    Location  [facility] - [facility] - Patient preference.    Sedation   Moderate Sedation - Per order.      Patient Reminders:   You will receive a call from a Nurse to review instructions and health history.  This assessment must be completed prior to your procedure.  Failure to complete the Nurse assessment may result in the procedure being cancelled.      On the day of your procedure, please designate an adult(s) who can drive you home stay with you for the next 24 hours. The medicines used in the exam will make you sleepy. You will not be able to drive.      You cannot take public transportation, ride share services, or non-medical taxi service without a responsible caregiver.  Medical transport services are allowed with the requirement that a responsible caregiver will receive you at your destination.  We require that drivers and caregivers are confirmed prior to your procedure.

## 2023-10-25 DIAGNOSIS — F33.0 MAJOR DEPRESSIVE DISORDER, RECURRENT EPISODE, MILD (H): ICD-10-CM

## 2023-10-25 DIAGNOSIS — Z00.00 ROUTINE GENERAL MEDICAL EXAMINATION AT A HEALTH CARE FACILITY: ICD-10-CM

## 2023-10-25 RX ORDER — BUPROPION HYDROCHLORIDE 75 MG/1
TABLET ORAL
Qty: 90 TABLET | Refills: 1 | OUTPATIENT
Start: 2023-10-25

## 2023-10-25 RX ORDER — LEVONORGESTREL AND ETHINYL ESTRADIOL 0.15-0.03
KIT ORAL
Qty: 91 TABLET | OUTPATIENT
Start: 2023-10-25

## 2023-12-12 ENCOUNTER — TELEPHONE (OUTPATIENT)
Dept: GASTROENTEROLOGY | Facility: CLINIC | Age: 43
End: 2023-12-12

## 2023-12-12 DIAGNOSIS — Z12.11 SPECIAL SCREENING FOR MALIGNANT NEOPLASMS, COLON: Primary | ICD-10-CM

## 2023-12-12 RX ORDER — BISACODYL 5 MG/1
TABLET, DELAYED RELEASE ORAL
Qty: 4 TABLET | Refills: 0 | Status: SHIPPED | OUTPATIENT
Start: 2023-12-12 | End: 2024-01-03

## 2023-12-12 NOTE — TELEPHONE ENCOUNTER
Pre visit planning completed.      Procedure details:    Patient scheduled for Colonoscopy  on 1/2/24.     Arrival time: 0945. Procedure time 1030    Pre op exam needed? N/A    Facility location: [facility]; [address], [place] [address]    Sedation type: Conscious sedation     Indication for procedure: Screening      Chart review:     Electronic implanted devices? No    Recent diagnosis of diverticulitis within the last 6 weeks? No    Diabetic? Yes. See medication holding recommendations     Diabetic medication HOLDING recommendations: (if applicable)  Oral diabetic medications: Yes:  Metformin (glucophage): HOLD day of procedure.  Diabetic injectables: Yes- Mounjaro (Tirzepatide).  Weekly dosing of medication.  Hold 7 days before procedure.  Follow up with managing provider.   Insulin: No      Medication review:    Anticoagulants? No    NSAIDS? No    Other medication HOLDING recommendations:  Iron supplements: HOLD 7 days before procedure.      Prep for procedure:     Bowel prep recommendation: Standard Golytely    Due to:  diabetes.     Prep instructions sent via mii       Bowel prep script sent to [id number] [place], [place] - [address] AT [facility] [address] [facility]        Radha Nieves RN  Endoscopy Procedure Pre Assessment RN  [phone] option 4

## 2023-12-13 NOTE — TELEPHONE ENCOUNTER
Attempted to contact patient in order to complete pre assessment questions.     No answer. Left message to return call to [phone] option 4    Missed call communication sent via [employer].    Corinne Kliber, RN  Endoscopy Procedure Pre Assessment RN  [phone] option 4

## 2023-12-13 NOTE — TELEPHONE ENCOUNTER
Pre assessment completed for upcoming procedure.   (Please see previous telephone encounter notes for complete details)    Patient  returned call.       Procedure details:    Arrival time and facility location reviewed.    Pre op exam needed? N/A    Designated  policy reviewed. Instructed to have someone stay 6 hours post procedure.     COVID policy reviewed.      Medication review:    Medications reviewed. Please see supporting documentation below. Holding recommendations discussed (if applicable).   Oral diabetic medication(s): Metformin (glucophage): HOLD day of procedure.  Injectable diabetic medication(s): Mounjaro (Tirzepatide).  Weekly dosing of medication.  Hold 7 days before procedure.  Follow up with managing provider.   Ferrous Sulfate (iron supplement): HOLD 7 days before procedure.      Prep for procedure:     Procedure prep instructions reviewed.        Additional information needed?  N/A      Patient  verbalized understanding and had no questions or concerns at this time.      Radha Paul RN  Endoscopy Procedure Pre Assessment RN  [phone] option 4

## 2024-01-02 ENCOUNTER — HOSPITAL ENCOUNTER (OUTPATIENT)
Facility: AMBULATORY SURGERY CENTER | Age: 44
Discharge: HOME | End: 2024-01-02
Attending: INTERNAL MEDICINE
Payer: COMMERCIAL

## 2024-01-02 ENCOUNTER — ANESTHESIA (OUTPATIENT)
Dept: SURGERY | Facility: AMBULATORY SURGERY CENTER | Age: 44
End: 2024-01-02
Payer: COMMERCIAL

## 2024-01-02 ENCOUNTER — ANESTHESIA EVENT (OUTPATIENT)
Dept: SURGERY | Facility: AMBULATORY SURGERY CENTER | Age: 44
End: 2024-01-02
Payer: COMMERCIAL

## 2024-01-02 VITALS — HEART RATE: 75 BPM

## 2024-01-02 VITALS
OXYGEN SATURATION: 99 % | SYSTOLIC BLOOD PRESSURE: 116 MMHG | TEMPERATURE: 97 F | DIASTOLIC BLOOD PRESSURE: 77 MMHG | RESPIRATION RATE: 18 BRPM

## 2024-01-02 LAB
COLONOSCOPY: NORMAL
GLUCOSE BLDC GLUCOMTR-MCNC: 160 MG/DL (ref 70–99)

## 2024-01-02 PROCEDURE — 45378 DIAGNOSTIC COLONOSCOPY: CPT

## 2024-01-02 PROCEDURE — G8918 PT W/O PREOP ORDER IV AB PRO: HCPCS

## 2024-01-02 PROCEDURE — G8907 PT DOC NO EVENTS ON DISCHARG: HCPCS

## 2024-01-02 RX ORDER — FLUMAZENIL 0.1 MG/ML
0.2 INJECTION, SOLUTION INTRAVENOUS
Status: ACTIVE | OUTPATIENT
Start: 2024-01-02 | End: 2024-01-02

## 2024-01-02 RX ORDER — ONDANSETRON 2 MG/ML
4 INJECTION INTRAMUSCULAR; INTRAVENOUS
Status: DISCONTINUED | OUTPATIENT
Start: 2024-01-02 | End: 2024-01-03 | Stop reason: HOSPADM

## 2024-01-02 RX ORDER — ONDANSETRON 4 MG/1
4 TABLET, ORALLY DISINTEGRATING ORAL EVERY 6 HOURS PRN
Status: DISCONTINUED | OUTPATIENT
Start: 2024-01-02 | End: 2024-01-03 | Stop reason: HOSPADM

## 2024-01-02 RX ORDER — SODIUM CHLORIDE, SODIUM LACTATE, POTASSIUM CHLORIDE, CALCIUM CHLORIDE 600; 310; 30; 20 MG/100ML; MG/100ML; MG/100ML; MG/100ML
INJECTION, SOLUTION INTRAVENOUS CONTINUOUS
Status: DISCONTINUED | OUTPATIENT
Start: 2024-01-02 | End: 2024-01-03 | Stop reason: HOSPADM

## 2024-01-02 RX ORDER — NALOXONE HYDROCHLORIDE 0.4 MG/ML
0.2 INJECTION, SOLUTION INTRAMUSCULAR; INTRAVENOUS; SUBCUTANEOUS
Status: DISCONTINUED | OUTPATIENT
Start: 2024-01-02 | End: 2024-01-03 | Stop reason: HOSPADM

## 2024-01-02 RX ORDER — LIDOCAINE HYDROCHLORIDE 20 MG/ML
INJECTION, SOLUTION INFILTRATION; PERINEURAL PRN
Status: DISCONTINUED | OUTPATIENT
Start: 2024-01-02 | End: 2024-01-02

## 2024-01-02 RX ORDER — PROCHLORPERAZINE MALEATE 10 MG
10 TABLET ORAL EVERY 6 HOURS PRN
Status: DISCONTINUED | OUTPATIENT
Start: 2024-01-02 | End: 2024-01-03 | Stop reason: HOSPADM

## 2024-01-02 RX ORDER — ONDANSETRON 2 MG/ML
4 INJECTION INTRAMUSCULAR; INTRAVENOUS EVERY 6 HOURS PRN
Status: DISCONTINUED | OUTPATIENT
Start: 2024-01-02 | End: 2024-01-03 | Stop reason: HOSPADM

## 2024-01-02 RX ORDER — PROPOFOL 10 MG/ML
INJECTION, EMULSION INTRAVENOUS CONTINUOUS PRN
Status: DISCONTINUED | OUTPATIENT
Start: 2024-01-02 | End: 2024-01-02

## 2024-01-02 RX ORDER — NALOXONE HYDROCHLORIDE 0.4 MG/ML
0.4 INJECTION, SOLUTION INTRAMUSCULAR; INTRAVENOUS; SUBCUTANEOUS
Status: DISCONTINUED | OUTPATIENT
Start: 2024-01-02 | End: 2024-01-03 | Stop reason: HOSPADM

## 2024-01-02 RX ORDER — PROPOFOL 10 MG/ML
INJECTION, EMULSION INTRAVENOUS PRN
Status: DISCONTINUED | OUTPATIENT
Start: 2024-01-02 | End: 2024-01-02

## 2024-01-02 RX ORDER — LIDOCAINE 40 MG/G
CREAM TOPICAL
Status: DISCONTINUED | OUTPATIENT
Start: 2024-01-02 | End: 2024-01-03 | Stop reason: HOSPADM

## 2024-01-02 RX ADMIN — PROPOFOL 20 MG: 10 INJECTION, EMULSION INTRAVENOUS at 10:36

## 2024-01-02 RX ADMIN — PROPOFOL 150 MCG/KG/MIN: 10 INJECTION, EMULSION INTRAVENOUS at 10:36

## 2024-01-02 RX ADMIN — SODIUM CHLORIDE, SODIUM LACTATE, POTASSIUM CHLORIDE, CALCIUM CHLORIDE: 600; 310; 30; 20 INJECTION, SOLUTION INTRAVENOUS at 10:06

## 2024-01-02 RX ADMIN — PROPOFOL 70 MG: 10 INJECTION, EMULSION INTRAVENOUS at 10:34

## 2024-01-02 RX ADMIN — LIDOCAINE HYDROCHLORIDE 60 MG: 20 INJECTION, SOLUTION INFILTRATION; PERINEURAL at 10:34

## 2024-01-02 NOTE — ANESTHESIA CARE TRANSFER NOTE
Patient: Radha Wilson    Procedure: Procedure(s):  Colonoscopy with CO2 insufflation       Diagnosis: Screen for colon cancer [Z12.11]  Diagnosis Additional Information: No value filed.    Anesthesia Type:   MAC     Note:    Oropharynx: oropharynx clear of all foreign objects  Level of Consciousness: awake  Oxygen Supplementation: room air    Independent Airway: airway patency satisfactory and stable  Dentition: dentition unchanged  Vital Signs Stable: post-procedure vital signs reviewed and stable  Report to RN Given: handoff report given  Patient transferred to: Phase II  Comments: Anesthesia Care Transfer Note    Patient: Radha Wilson    Transferred to: Phase II    Patient vital signs: stable    Airway: none    Monitors on, breathing spontaneously, report to RN    Toya Rodriguez CRNA  1/2/2024 10:58 AM         Handoff Report: Identifed the Patient, Identified the Reponsible Provider, Reviewed the pertinent medical history, Discussed the surgical course, Reviewed Intra-OP anesthesia mangement and issues during anesthesia, Set expectations for post-procedure period and Allowed opportunity for questions and acknowledgement of understanding      Vitals:  Vitals Value Taken Time   BP     Temp     Pulse     Resp     SpO2         Electronically Signed By: RAMA Dewey CRNA  January 2, 2024  10:58 AM

## 2024-01-02 NOTE — ANESTHESIA POSTPROCEDURE EVALUATION
Patient: Radha Wilson    Procedure: Procedure(s):  Colonoscopy with CO2 insufflation       Anesthesia Type:  MAC    Note:  Disposition: Outpatient   Postop Pain Control: Uneventful            Sign Out: Well controlled pain   PONV: No   Neuro/Psych: Uneventful            Sign Out: Acceptable/Baseline neuro status   Airway/Respiratory: Uneventful            Sign Out: Acceptable/Baseline resp. status   CV/Hemodynamics: Uneventful            Sign Out: Acceptable CV status; No obvious hypovolemia; No obvious fluid overload   Other NRE: NONE   DID A NON-ROUTINE EVENT OCCUR?            Last vitals:  Vitals Value Taken Time   /77 01/02/24 1117   Temp 97  F (36.1  C) 01/02/24 1100   Pulse     Resp 18 01/02/24 1117   SpO2 99 % 01/02/24 1117       Electronically Signed By: Milo Recinos MD  January 2, 2024  11:47 AM

## 2024-01-02 NOTE — ANESTHESIA PREPROCEDURE EVALUATION
Anesthesia Pre-Procedure Evaluation    Patient: Radha Wilson   MRN: [id number] : 1980        Procedure : Procedure(s):  Colonoscopy with CO2 insufflation          Past Medical History:   Diagnosis Date     ASCUS on Pap smear 2006    HPV positive,  NIL and HPV neg     Depressive disorder      Hyperlipidemia LDL goal < 160      Hypertension goal BP (blood pressure) < 140/90 3/3/2022     Type 2 diabetes mellitus without complications (H) 2017      Past Surgical History:   Procedure Laterality Date     ABDOMEN SURGERY       APPENDECTOMY  12    [facility]     EXAM OF VAGINA,COLPOSCOPY  06    Benign     HC TOOTH EXTRACTION W/FORCEP       TONSILLECTOMY & ADENOIDECTOMY        Allergies   Allergen Reactions     Latex Unknown     Feels like she is going to choke      Social History     Tobacco Use     Smoking status: Never     Smokeless tobacco: Never     Tobacco comments:     Nonsmoking household   Substance Use Topics     Alcohol use: No     Comment: Minimal      Wt Readings from Last 1 Encounters:   23 81.1 kg (178 lb 12.8 oz)        Anesthesia Evaluation            ROS/MED HX  ENT/Pulmonary:       Neurologic:       Cardiovascular:     (+)  hypertension- -   -  - -                                      METS/Exercise Tolerance:     Hematologic:       Musculoskeletal:       GI/Hepatic:       Renal/Genitourinary:       Endo:     (+) type I DM,                     Psychiatric/Substance Use:       Infectious Disease:       Malignancy:       Other:            Physical Exam    Airway        Mallampati: II   TM distance: > 3 FB   Neck ROM: full     Respiratory Devices and Support         Dental       (+) Completely normal teeth      Cardiovascular          Rhythm and rate: regular     Pulmonary           breath sounds clear to auscultation       OUTSIDE LABS:  CBC:   Lab Results   Component Value Date    WBC 8.0 2017    WBC 6.1 2009    HGB 14.3 2017    HGB 14.3  "11/14/2011    HCT 41.9 07/21/2017    HCT 41.0 11/06/2009     07/21/2017     11/06/2009     BMP:   Lab Results   Component Value Date     (L) 09/25/2023     03/20/2023    POTASSIUM 4.2 09/25/2023    POTASSIUM 3.9 03/20/2023    CHLORIDE 102 09/25/2023    CHLORIDE 103 03/20/2023    CO2 17 (L) 09/25/2023    CO2 25 03/20/2023    BUN 10.3 09/25/2023    BUN 12 03/20/2023    CR 0.72 09/25/2023    CR 0.72 03/20/2023     (H) 01/02/2024     (H) 09/25/2023     COAGS: No results found for: \"PTT\", \"INR\", \"FIBR\"  POC: No results found for: \"BGM\", \"HCG\", \"HCGS\"  HEPATIC:   Lab Results   Component Value Date    ALBUMIN 4.3 11/06/2009    PROTTOTAL 7.8 11/06/2009    ALT 22 11/06/2009    AST 36 11/06/2009    ALKPHOS 53 11/06/2009    BILITOTAL 0.4 11/06/2009     OTHER:   Lab Results   Component Value Date    A1C 8.1 (H) 09/25/2023    LANETTE 8.9 09/25/2023    TSH 1.73 08/26/2019       Anesthesia Plan    ASA Status:  2       Anesthesia Type: MAC.     - Reason for MAC: immobility needed              Consents    Anesthesia Plan(s) and associated risks, benefits, and realistic alternatives discussed. Questions answered and patient/representative(s) expressed understanding.     - Discussed: Risks, Benefits and Alternatives for BOTH SEDATION and the PROCEDURE were discussed     - Discussed with:  Patient            Postoperative Care    Pain management: Multi-modal analgesia.   PONV prophylaxis: Ondansetron (or other 5HT-3), Background Propofol Infusion     Comments:             iMlo Recinos MD    I have reviewed the pertinent notes and labs in the chart from the past 30 days and (re)examined the patient.  Any updates or changes from those notes are reflected in this note.              # DMII: A1C = N/A within past 6 months      "

## 2024-01-03 ENCOUNTER — DOCUMENTATION ONLY (OUTPATIENT)
Dept: FAMILY MEDICINE | Facility: CLINIC | Age: 44
End: 2024-01-03
Payer: COMMERCIAL

## 2024-01-03 DIAGNOSIS — E11.9 CONTROLLED TYPE 2 DIABETES MELLITUS WITHOUT COMPLICATION, WITHOUT LONG-TERM CURRENT USE OF INSULIN (H): Primary | ICD-10-CM

## 2024-01-05 ENCOUNTER — LAB (OUTPATIENT)
Dept: LAB | Facility: CLINIC | Age: 44
End: 2024-01-05
Payer: COMMERCIAL

## 2024-01-05 DIAGNOSIS — E11.9 CONTROLLED TYPE 2 DIABETES MELLITUS WITHOUT COMPLICATION, WITHOUT LONG-TERM CURRENT USE OF INSULIN (H): ICD-10-CM

## 2024-01-05 LAB — HBA1C MFR BLD: 7.7 % (ref 0–5.6)

## 2024-01-05 PROCEDURE — 36415 COLL VENOUS BLD VENIPUNCTURE: CPT

## 2024-01-05 PROCEDURE — 83036 HEMOGLOBIN GLYCOSYLATED A1C: CPT

## 2024-01-11 DIAGNOSIS — E11.9 CONTROLLED TYPE 2 DIABETES MELLITUS WITHOUT COMPLICATION, WITHOUT LONG-TERM CURRENT USE OF INSULIN (H): ICD-10-CM

## 2024-01-11 RX ORDER — TIRZEPATIDE 2.5 MG/.5ML
INJECTION, SOLUTION SUBCUTANEOUS
Qty: 6 ML | Refills: 0 | Status: SHIPPED | OUTPATIENT
Start: 2024-01-11 | End: 2024-03-19

## 2024-01-12 ENCOUNTER — TELEPHONE (OUTPATIENT)
Dept: FAMILY MEDICINE | Facility: CLINIC | Age: 44
End: 2024-01-12
Payer: COMMERCIAL

## 2024-01-12 NOTE — TELEPHONE ENCOUNTER
Prior Authorization Retail Medication Request    Medication/Dose: MOUNJARO 2.5 MG/0.5ML pen  Diagnosis and ICD code (if different than what is on RX):    New/renewal/insurance change PA/secondary ins. PA:  Previously Tried and Failed:    Rationale:      Insurance   Primary: [employer]   Insurance ID:  [id number]         Pharmacy Information (if different than what is on RX)  Name:  [facility]  Phone:  [phone]  Fax:[phone]

## 2024-01-22 ENCOUNTER — MYC REFILL (OUTPATIENT)
Dept: FAMILY MEDICINE | Facility: CLINIC | Age: 44
End: 2024-01-22
Payer: COMMERCIAL

## 2024-01-22 DIAGNOSIS — Z00.00 ROUTINE GENERAL MEDICAL EXAMINATION AT A HEALTH CARE FACILITY: ICD-10-CM

## 2024-01-22 RX ORDER — LEVONORGESTREL AND ETHINYL ESTRADIOL 0.15-0.03
KIT ORAL
Qty: 112 TABLET | Refills: 0 | Status: SHIPPED | OUTPATIENT
Start: 2024-01-22 | End: 2024-03-19

## 2024-01-22 NOTE — TELEPHONE ENCOUNTER
Prior Authorization Approval    Authorization Effective Date: 1/22/2024  Authorization Expiration Date: 1/22/2025  Medication: MOUNJARO 2.5 MG/0.5ML pen  Approved Dose/Quantity:    Reference #:     Insurance Company: Elly ([facility]) - Phone [phone] Fax [phone]  Expected CoPay:       CoPay Card Available:      Foundation Assistance Needed:    Which Pharmacy is filling the prescription (Not needed for infusion/clinic administered): [id number] [address] AT [facility] [place]  Pharmacy Notified:  Yes  Patient Notified:  **Instructed pharmacy to notify patient when script is ready to /ship.**

## 2024-01-22 NOTE — TELEPHONE ENCOUNTER
Central Prior Authorization Team   Phone: [phone]    PA Initiation    Medication: MOUNJARO 2.5 MG/0.5ML pen  Insurance Company: Elly ([facility]) - Phone [phone] Fax [phone]  Pharmacy Filling the Rx: [id number] - [place] - [address] AT [facility] & [place]  Filling Pharmacy Phone: [phone]  Filling Pharmacy Fax:    Start Date: 1/22/2024

## 2024-02-10 DIAGNOSIS — F33.0 MAJOR DEPRESSIVE DISORDER, RECURRENT EPISODE, MILD (H): ICD-10-CM

## 2024-02-10 DIAGNOSIS — F41.9 ANXIETY: ICD-10-CM

## 2024-02-11 RX ORDER — PAROXETINE HYDROCHLORIDE HEMIHYDRATE 25 MG/1
50 TABLET, FILM COATED, EXTENDED RELEASE ORAL DAILY
Qty: 180 TABLET | Refills: 0 | Status: SHIPPED | OUTPATIENT
Start: 2024-02-11 | End: 2024-05-06

## 2024-03-17 SDOH — HEALTH STABILITY: PHYSICAL HEALTH: ON AVERAGE, HOW MANY DAYS PER WEEK DO YOU ENGAGE IN MODERATE TO STRENUOUS EXERCISE (LIKE A BRISK WALK)?: 0 DAYS

## 2024-03-17 SDOH — HEALTH STABILITY: PHYSICAL HEALTH: ON AVERAGE, HOW MANY MINUTES DO YOU ENGAGE IN EXERCISE AT THIS LEVEL?: 0 MIN

## 2024-03-17 ASSESSMENT — SOCIAL DETERMINANTS OF HEALTH (SDOH): HOW OFTEN DO YOU GET TOGETHER WITH FRIENDS OR RELATIVES?: ONCE A WEEK

## 2024-03-18 NOTE — COMMUNITY RESOURCES LIST (ENGLISH)
March 18, 2024           YOUR PERSONALIZED LIST OF SERVICES & PROGRAMS           & RECREATION    Sports      Park & Recreation Board - Sports clubs and recreational activities - [place] [employer] [facility]  [address] [place] [address] (Distance: 2.3 miles)  Language: English  Fee: Free, Self pay      Park & Recreation Board - Sports clubs and recreational activities - [place] [employer] [facility]  [address] [place] [address] (Distance: 2.1 miles)  Phone: [phone]  Language: English  Fee: Free, Self pay  Accessibility: Ada accessible      [facility] - Adult Enrichment  Phone: [phone]  Website: [email or web address]/adults-seniors/adult-enrichment/  Language: English  Hours: Mon 7:30 AM - 4:00 PM Tue 7:30 AM - 4:00 PM Wed 7:30 AM - 4:00 PM Thu 7:30 AM - 4:00 PM Fri 7:30 AM - 4:00 PM    Classes/Groups      [facility] - Gym or workout facility  [address] [place] [id number] (Distance: 3.1 miles)  Phone: [phone]  Language: English, Turkish  Fee: Self pay  Accessibility: Ada accessible      Park & Recreation [facility] - Gym or workout facility - [facility]  [address] [place] [address] (Distance: 6.8 miles)  Language: English  Fee: Free, Self pay  Accessibility: Ada accessible      Workouts - Exercise Class/At Home Workouts  Website: https://homeworkouts.org/  Language: English               IMPORTANT NUMBERS & WEBSITES        Emergency Services  911  .   United Way  211 http://211unitedway.org  .   Poison Control  (242) 752-4487 http://[place]poison.org http://wisconsinpoison.org  .     Suicide and Crisis Lifeline  988 http://988lifeline.org  .   Childhelp National Child Abuse Hotline  664.834.6141 http://Childhelphotline.org   .   National Sexual Assault Hotline  (581) 181-4695 (HOPE)  http://Rainn.org   .     National Runaway Safeline  (405) 222-6326 (RUNAWAY) http://National Billing PartnersrunaCS-Keys.org  .   Pregnancy & Postpartum Support  Call/text 662-006-8238  [place]: http://ppsupportmn.org  [place]: http://psichapters.com/wi  .   Substance Abuse National Helpline ([facility])  061-448-HELP (9297) http://Findtreatment.gov   .                DISCLAIMER: [employer] does not endorse any service providers mentioned in this resource list. [employer] does not guarantee that the services mentioned in this resource list will be available to you or will improve your health or wellness.    [facility]

## 2024-03-19 ENCOUNTER — OFFICE VISIT (OUTPATIENT)
Dept: FAMILY MEDICINE | Facility: CLINIC | Age: 44
End: 2024-03-19
Payer: COMMERCIAL

## 2024-03-19 VITALS
SYSTOLIC BLOOD PRESSURE: 134 MMHG | RESPIRATION RATE: 17 BRPM | TEMPERATURE: 98.1 F | DIASTOLIC BLOOD PRESSURE: 84 MMHG | HEART RATE: 100 BPM | BODY MASS INDEX: 30.69 KG/M2 | HEIGHT: 63 IN | WEIGHT: 173.2 LBS | OXYGEN SATURATION: 98 %

## 2024-03-19 DIAGNOSIS — F33.0 MAJOR DEPRESSIVE DISORDER, RECURRENT EPISODE, MILD (H): ICD-10-CM

## 2024-03-19 DIAGNOSIS — E11.9 CONTROLLED TYPE 2 DIABETES MELLITUS WITHOUT COMPLICATION, WITHOUT LONG-TERM CURRENT USE OF INSULIN (H): ICD-10-CM

## 2024-03-19 DIAGNOSIS — Z00.00 ROUTINE GENERAL MEDICAL EXAMINATION AT A HEALTH CARE FACILITY: Primary | ICD-10-CM

## 2024-03-19 DIAGNOSIS — I10 HYPERTENSION GOAL BP (BLOOD PRESSURE) < 140/90: ICD-10-CM

## 2024-03-19 DIAGNOSIS — S61.451A DOG BITE, HAND, RIGHT, INITIAL ENCOUNTER: ICD-10-CM

## 2024-03-19 DIAGNOSIS — Z12.31 VISIT FOR SCREENING MAMMOGRAM: ICD-10-CM
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
## 2024-03-19 DIAGNOSIS — E78.1 HYPERTRIGLYCERIDEMIA: ICD-10-CM

## 2024-03-19 PROCEDURE — 99214 OFFICE O/P EST MOD 30 MIN: CPT | Mod: 25 | Performed by: FAMILY MEDICINE

## 2024-03-19 PROCEDURE — 82043 UR ALBUMIN QUANTITATIVE: CPT | Performed by: FAMILY MEDICINE

## 2024-03-19 PROCEDURE — 82570 ASSAY OF URINE CREATININE: CPT | Performed by: FAMILY MEDICINE

## 2024-03-19 PROCEDURE — 99396 PREV VISIT EST AGE 40-64: CPT | Performed by: FAMILY MEDICINE

## 2024-03-19 PROCEDURE — 80061 LIPID PANEL: CPT | Performed by: FAMILY MEDICINE

## 2024-03-19 PROCEDURE — 80048 BASIC METABOLIC PNL TOTAL CA: CPT | Performed by: FAMILY MEDICINE

## 2024-03-19 PROCEDURE — 36415 COLL VENOUS BLD VENIPUNCTURE: CPT | Performed by: FAMILY MEDICINE

## 2024-03-19 RX ORDER — LEVONORGESTREL AND ETHINYL ESTRADIOL 0.15-0.03
KIT ORAL
Qty: 112 TABLET | Refills: 3 | Status: SHIPPED | OUTPATIENT
Start: 2024-03-19

## 2024-03-19 RX ORDER — METFORMIN HCL 500 MG
1000 TABLET, EXTENDED RELEASE 24 HR ORAL 2 TIMES DAILY WITH MEALS
Qty: 360 TABLET | Refills: 1 | Status: SHIPPED | OUTPATIENT
Start: 2024-03-19

## 2024-03-19 RX ORDER — BUPROPION HYDROCHLORIDE 75 MG/1
TABLET ORAL
Qty: 90 TABLET | Refills: 1 | Status: SHIPPED | OUTPATIENT
Start: 2024-03-19

## 2024-03-19 RX ORDER — LOSARTAN POTASSIUM 100 MG/1
100 TABLET ORAL DAILY
Qty: 90 TABLET | Refills: 1 | Status: SHIPPED | OUTPATIENT
Start: 2024-03-19

## 2024-03-19 ASSESSMENT — ANXIETY QUESTIONNAIRES
7. FEELING AFRAID AS IF SOMETHING AWFUL MIGHT HAPPEN: NOT AT ALL
2. NOT BEING ABLE TO STOP OR CONTROL WORRYING: NOT AT ALL
6. BECOMING EASILY ANNOYED OR IRRITABLE: NOT AT ALL
5. BEING SO RESTLESS THAT IT IS HARD TO SIT STILL: NOT AT ALL
1. FEELING NERVOUS, ANXIOUS, OR ON EDGE: NOT AT ALL
IF YOU CHECKED OFF ANY PROBLEMS ON THIS QUESTIONNAIRE, HOW DIFFICULT HAVE THESE PROBLEMS MADE IT FOR YOU TO DO YOUR WORK, TAKE CARE OF THINGS AT HOME, OR GET ALONG WITH OTHER PEOPLE: NOT DIFFICULT AT ALL
GAD7 TOTAL SCORE: 0
GAD7 TOTAL SCORE: 0
3. WORRYING TOO MUCH ABOUT DIFFERENT THINGS: NOT AT ALL

## 2024-03-19 ASSESSMENT — PATIENT HEALTH QUESTIONNAIRE - PHQ9
SUM OF ALL RESPONSES TO PHQ QUESTIONS 1-9: 2
5. POOR APPETITE OR OVEREATING: NOT AT ALL

## 2024-03-19 ASSESSMENT — PAIN SCALES - GENERAL: PAINLEVEL: MILD PAIN (3)

## 2024-03-19 NOTE — PROGRESS NOTES
"Preventive Care Visit  [facility]  Katy Moore MD, Family Medicine  Mar 19, 2024      Assessment & Plan     (Z00.00) Routine general medical examination at a health care facility  (primary encounter diagnosis)  Comment: preventive needs reviewed   Plan: levonorgestrel-ethinyl estradiol (QUASENSE)         0.15-0.03 MG tablet        see orders in Epic.   Refill x 1 yr     (E11.9) Controlled type 2 diabetes mellitus without complication, without long-term current use of insulin (H)  Comment: improved  Plan: Lipid panel reflex to direct LDL Non-fasting,         BASIC METABOLIC PANEL, Albumin Random Urine         Quantitative with Creat Ratio, metFORMIN         (GLUCOPHAGE XR) 500 MG 24 hr tablet,         tirzepatide-Weight Management (ZEPBOUND) 5         MG/0.5ML prefilled pen, OFFICE/OUTPT         VISIT,EST,LEVL IV        Increase mounjaro dose to 5 mg weekly   Refill x 6 months, follow-up 6 months for labs and diabetes    (F33.0) Major depressive disorder, recurrent episode, mild (H24)  Comment: stable, doing well  Plan: buPROPion (WELLBUTRIN) 75 MG tablet,         OFFICE/OUTPT VISIT,EST,LEVL IV         Refill x 6 months     (I10) Hypertension goal BP (blood pressure) < 140/90  Comment: to goal  Plan: Albumin Random Urine Quantitative with Creat         Ratio, losartan (COZAAR) 100 MG tablet,         OFFICE/OUTPT VISIT,EST,LEVL IV         Refill x 6 months       (S61.451A,  W54.0XXA) Dog bite, hand, right, initial encounter  Comment: this morning  Plan: amoxicillin-clavulanate (AUGMENTIN) 875-125 MG         tablet        Puncture wounds of right hand    (Z12.31) Visit for screening mammogram  Comment: due  Plan: MA Screen Bilateral w/Carlos                      BMI  Estimated body mass index is 30.68 kg/m  as calculated from the following:    Height as of this encounter: 1.6 m (5' 3\").    Weight as of this encounter: 78.6 kg (173 lb 3.2 oz).   Weight management plan: Discussed healthy diet " and exercise guidelines    Counseling  Appropriate preventive services were discussed with this patient, including applicable screening as appropriate for fall prevention, nutrition, physical activity, Tobacco-use cessation, weight loss and cognition.  Checklist reviewing preventive services available has been given to the patient.  Reviewed patient's diet, addressing concerns and/or questions.   She is at risk for psychosocial distress and has been provided with information to reduce risk.       See Patient Instructions    Subjective   Shanta is a 44 year old, presenting for the following:  Physical and Trauma (Dog bite. RT hand )        3/19/2024     9:06 AM   Additional Questions   Roomed by Brent SANCHEZ LPN   Accompanied by Self         3/19/2024     9:06 AM   Patient Reported Additional Medications   Patient reports taking the following new medications None        Health Care Directive  Patient has a Health Care Directive on file  Advance care planning document is on file and is current.    HPI  Doing well, no side effects on mounjaro, would like to increase dose    Bit by her dog this am on right hand at base of thumb.  Fully vaccinated dog.            3/17/2024   General Health   How would you rate your overall physical health? Good   Feel stress (tense, anxious, or unable to sleep) Only a little   (!) STRESS CONCERN      3/17/2024   Nutrition   Three or more servings of calcium each day? Yes   Diet: Regular (no restrictions)    Diabetic   How many servings of fruit and vegetables per day? (!) 0-1   How many sweetened beverages each day? 0-1         3/17/2024   Exercise   Days per week of moderate/strenous exercise 0 days   Average minutes spent exercising at this level 0 min   (!) EXERCISE CONCERN      3/17/2024   Social Factors   Frequency of gathering with friends or relatives Once a week   Worry food won't last until get money to buy more No   Food not last or not have enough money for food? No   Do you have  housing?  Yes   Are you worried about losing your housing? No   Lack of transportation? No   Unable to get utilities (heat,electricity)? No         3/17/2024   Dental   Dentist two times every year? Yes         3/17/2024   TB Screening   Were you born outside of the [place]? No         Today's PHQ-9 Score:       3/19/2024    11:33 AM   PHQ-9 SCORE   PHQ-9 Total Score 2           3/17/2024   Substance Use   Alcohol more than 3/day or more than 7/wk Not Applicable   Do you use any other substances recreationally? No     Social History     Tobacco Use    Smoking status: Never    Smokeless tobacco: Never    Tobacco comments:     Nonsmoking household   Vaping Use    Vaping Use: Never used   Substance Use Topics    Alcohol use: No     Comment: Minimal    Drug use: No             3/17/2024   Breast Cancer Screening   Family history of breast, colon, or ovarian cancer? No / Unknown         3/8/2023   LAST FHS-7 RESULTS   1st degree relative breast or ovarian cancer No   Any relative bilateral breast cancer No   Any male have breast cancer No   Any ONE woman have BOTH breast AND ovarian cancer No   Any woman with breast cancer before 50yrs No   2 or more relatives with breast AND/OR ovarian cancer No   2 or more relatives with breast AND/OR bowel cancer No        Mammogram Screening - Mammogram every 1-2 years updated in Health Maintenance based on mutual decision making    G 0 P 0   Patient's last menstrual period was 11/11/2023 (exact date).     Fasting: No   Td:tdap 2/2020       Flu: 8/2023      Covid: 8/2023      Shingrix: NA      PPV: done      Last 3 Pap and HPV Results:       Latest Ref Rng & Units 2/24/2020    10:58 AM 2/24/2020    10:57 AM 1/6/2015    12:00 AM   PAP / HPV   PAP (Historical)   NIL  NIL    HPV 16 DNA NEG^Negative Negative      HPV 18 DNA NEG^Negative Negative      Other HR HPV NEG^Negative Negative                     Cholesterol:   Lab Results   Component Value Date    CHOL 201 03/20/2023    CHOL 247  11/11/2020     Lab Results   Component Value Date    HDL 47 03/20/2023    HDL 49 11/11/2020     Lab Results   Component Value Date    LDL 96 03/20/2023     11/11/2020     Lab Results   Component Value Date    TRIG 291 03/20/2023    TRIG 328 11/11/2020     Lab Results   Component Value Date    CHOLHDLRATIO 4.2 11/14/2011         MMG: 3/2023  Dexa:  NA     Flex/colo: 1/2024 q10y scope      Seat Belt: Yes    Sunscreen use: Yes   Calcium Intake: adeq  Health Care Directive: Yes   Sexually Active: Yes     Current contraception: oral contraceptives  History of abnormal Pap smear: Yes: see pmh  Family history of colon/breast/ovarian cancer: No  Regular self breast exam: Yes  History of abnormal mammogram: No          3/17/2024   STI Screening   New sexual partner(s) since last STI/HIV test? No         ASCVD Risk   The 10-year ASCVD risk score (Eveline ALEJANDRA, et al., 2019) is: 3.2%    Values used to calculate the score:      Age: 44 years      Sex: Female      Is Non- : No      Diabetic: Yes      Tobacco smoker: No      Systolic Blood Pressure: 134 mmHg      Is BP treated: Yes      HDL Cholesterol: 47 mg/dL      Total Cholesterol: 201 mg/dL        3/17/2024   Contraception/Family Planning   Questions about contraception or family planning No        Reviewed and updated as needed this visit by Provider                    BP Readings from Last 3 Encounters:   03/19/24 134/84   01/02/24 116/77   09/25/23 133/76    Wt Readings from Last 3 Encounters:   03/19/24 78.6 kg (173 lb 3.2 oz)   09/25/23 81.1 kg (178 lb 12.8 oz)   03/20/23 79.8 kg (176 lb)                  Patient Active Problem List   Diagnosis    CARDIOVASCULAR SCREENING; LDL GOAL LESS THAN 160    Anxiety    Advanced directives, counseling/discussion    Myopia    Controlled type 2 diabetes mellitus without complication, without long-term current use of insulin (H)    Major depressive disorder, recurrent episode, mild (H24)     Hypertension goal BP (blood pressure) < 140/90     Past Surgical History:   Procedure Laterality Date    ABDOMEN SURGERY      APPENDECTOMY  5/19/12    [facility]    COLONOSCOPY WITH CO2 INSUFFLATION N/A 1/2/2024    Procedure: Colonoscopy with CO2 insufflation;  Surgeon: Temi Saleem DO;  Location: [facility] OR    EXAM OF VAGINA,COLPOSCOPY  9/20/06    Benign    HC TOOTH EXTRACTION W/FORCEP      TONSILLECTOMY & ADENOIDECTOMY         Social History     Tobacco Use    Smoking status: Never    Smokeless tobacco: Never    Tobacco comments:     Nonsmoking household   Substance Use Topics    Alcohol use: No     Comment: Minimal     Family History   Problem Relation Age of Onset    Cancer Mother         lymphoma    Other Cancer Mother         1994-current    Lipids Father     Hyperlipidemia Father     Bladder Cancer Father     Coronary Artery Disease Father         Cause of death    Other Cancer Father         Bladder    Heart Disease Maternal Grandfather         70s    Cancer - colorectal Paternal Grandmother         80s    Glaucoma Paternal Grandmother     Anesthesia Reaction Other         lung problems during appendectomy         Current Outpatient Medications   Medication Sig Dispense Refill    buPROPion (WELLBUTRIN) 75 MG tablet TAKE 1 TABLET(75 MG) BY MOUTH AT BEDTIME Strength: 75 mg 90 tablet 1    levonorgestrel-ethinyl estradiol (QUASENSE) 0.15-0.03 MG tablet Skip week 4 of packs 1-3, then take week 4 of pack 4 112 tablet 3    losartan (COZAAR) 100 MG tablet Take 1 tablet (100 mg) by mouth daily 90 tablet 1    metFORMIN (GLUCOPHAGE XR) 500 MG 24 hr tablet Take 2 tablets (1,000 mg) by mouth 2 times daily (with meals) 360 tablet 1    Multiple Vitamins-Minerals (MULTIVITAMIN PO) Take 1 tablet by mouth daily      PARoxetine (PAXIL-CR) 25 MG 24 hr tablet TAKE 2 TABLETS BY MOUTH EVERY  tablet 0    STATIN NOT PRESCRIBED (INTENTIONAL) Please choose reason not prescribed from choices below.      tirzepatide-Weight  Management (ZEPBOUND) 5 MG/0.5ML prefilled pen Inject 0.5 mLs (5 mg) Subcutaneous every 7 days 6 mL 1    valACYclovir (VALTREX) 1000 mg tablet       IRON PO Take by mouth daily (Patient not taking: Reported on 3/19/2024)       Recent Labs   Lab Test 01/05/24  1142 09/25/23  1132 07/06/23  1153 03/20/23  0806 04/08/22  1005 03/03/22  1118 12/29/21  1047 09/14/21  1039 06/11/21  1031 02/12/21  1354 11/11/20  0958 02/24/20  1032 08/26/19  0730 07/28/17  1053 07/21/17  1014   A1C 7.7* 8.1* 8.3*  --    < >  --    < > 8.1*   < > 8.8* 8.3*   < > 7.2*   < >  --    LDL  --   --   --  96  --  137*  --  100  --   --  132*   < >  --    < > 128  Test canceled after completion  CORRECTED ON 07/24 AT 1113: PREVIOUSLY REPORTED  Above desirable:  100 129   mg/dl Borderline High:  130 159 mg/dL High:             160 189 mg/dL Very   high:       >189 mg/dl     HDL  --   --   --  47*  --  52  --  52  --   --  49*   < >  --    < > 46*  Test canceled after completion  CORRECTED ON 07/24 AT 1113: PREVIOUSLY REPORTED AS 46     TRIG  --   --   --  291*  --  196*  --  355*  --   --  328*   < >  --    < > 319*  Test canceled after completion  CORRECTED ON 07/24 AT 1113: PREVIOUSLY REPORTED  Borderline high:  150 199   mg/dl High:             200 499 mg/dl Very high:       >499 mg/dl     CR  --  0.72  --  0.72   < > 0.68  --  0.75   < > 0.67 0.71   < >  --    < >  --    GFRESTIMATED  --  >90  --  >90   < > >90  --  >90   < > >90 >90   < >  --    < >  --    GFRESTBLACK  --   --   --   --   --   --   --   --   --  >90 >90   < >  --    < >  --    POTASSIUM  --  4.2  --  3.9   < > 4.0  --  4.6   < > 4.1 4.2   < >  --    < >  --    TSH  --   --   --   --   --   --   --   --   --   --   --   --  1.73  --  2.35    < > = values in this interval not displayed.          Review of Systems  Constitutional, neuro, ENT, endocrine, pulmonary, cardiac, gastrointestinal, genitourinary, musculoskeletal, integument and psychiatric systems are  "negative, except as otherwise noted.     Objective    Exam  /84   Pulse 100   Temp 98.1  F (36.7  C) (Tympanic)   Resp 17   Ht 1.6 m (5' 3\")   Wt 78.6 kg (173 lb 3.2 oz)   LMP 11/11/2023 (Exact Date)   SpO2 98%   BMI 30.68 kg/m     Estimated body mass index is 30.68 kg/m  as calculated from the following:    Height as of this encounter: 1.6 m (5' 3\").    Weight as of this encounter: 78.6 kg (173 lb 3.2 oz).    Physical Exam  GENERAL: alert and no distress  EYES: Eyes grossly normal to inspection, PERRL and conjunctivae and sclerae normal  HENT: ear canals and TM's normal, nose and mouth without ulcers or lesions  NECK: no adenopathy, no asymmetry, masses, or scars  RESP: lungs clear to auscultation - no rales, rhonchi or wheezes  BREAST: normal without masses, tenderness or nipple discharge and no palpable axillary masses or adenopathy  CV: regular rate and rhythm, normal S1 S2, no S3 or S4, no murmur, click or rub, no peripheral edema  ABDOMEN: soft, nontender, no hepatosplenomegaly, no masses and bowel sounds normal  MS: no gross musculoskeletal defects noted, no edema  SKIN: no suspicious lesions or rashes, puncture wound on right dorsal thenar surface and palm along with other abrasions in bite pattern  NEURO: Normal strength and tone, mentation intact and speech normal  PSYCH: mentation appears normal, affect normal/bright  Diabetic foot exam: normal DP and PT pulses, no trophic changes or ulcerative lesions, normal sensory exam, and normal monofilament exam        Signed Electronically by: Katy Moore MD    "

## 2024-03-19 NOTE — PATIENT INSTRUCTIONS
Increase Mounjaro to 5 mg weekly.  Recheck A1C in 6 months    Preventive Care Advice   This is general advice given by our system to help you stay healthy. However, your care team may have specific advice just for you. Please talk to your care team about your preventive care needs.  Nutrition  Eat 5 or more servings of fruits and vegetables each day.  Try wheat bread, brown rice and whole grain pasta (instead of white bread, rice, and pasta).  Get enough calcium and vitamin D. Check the label on foods and aim for 100% of the RDA (recommended daily allowance).  Lifestyle  Exercise at least 150 minutes each week   (30 minutes a day, 5 days a week).  Do muscle strengthening activities 2 days a week. These help control your weight and prevent disease.  No smoking.  Wear sunscreen to prevent skin cancer.  Have a dental exam and cleaning every 6 months.  Yearly exams  See your health care team every year to talk about:  Any changes in your health.  Any medicines your care team has prescribed.  Preventive care, family planning, and ways to prevent chronic diseases.  Shots (vaccines)   HPV shots (up to age 26), if you've never had them before.  Hepatitis B shots (up to age 59), if you've never had them before.  COVID-19 shot: Get this shot when it's due.  Flu shot: Get a flu shot every year.  Tetanus shot: Get a tetanus shot every 10 years.  Pneumococcal, hepatitis A, and RSV shots: Ask your care team if you need these based on your risk.  Shingles shot (for age 50 and up).  General health tests  Diabetes screening:  Starting at age 35, Get screened for diabetes at least every 3 years.  If you are younger than age 35, ask your care team if you should be screened for diabetes.  Cholesterol test: At age 39, start having a cholesterol test every 5 years, or more often if advised.  Bone density scan (DEXA): At age 50, ask your care team if you should have this scan for osteoporosis (brittle bones).  Hepatitis C: Get tested at  least once in your life.  STIs (sexually transmitted infections)  Before age 24: Ask your care team if you should be screened for STIs.  After age 24: Get screened for STIs if you're at risk. You are at risk for STIs (including HIV) if:  You are sexually active with more than one person.  You don't use condoms every time.  You or a partner was diagnosed with a sexually transmitted infection.  If you are at risk for HIV, ask about PrEP medicine to prevent HIV.  Get tested for HIV at least once in your life, whether you are at risk for HIV or not.  Cancer screening tests  Cervical cancer screening: If you have a cervix, begin getting regular cervical cancer screening tests at age 21. Most people who have regular screenings with normal results can stop after age 65. Talk about this with your provider.  Breast cancer scan (mammogram): If you've ever had breasts, begin having regular mammograms starting at age 40. This is a scan to check for breast cancer.  Colon cancer screening: It is important to start screening for colon cancer at age 45.  Have a colonoscopy test every 10 years (or more often if you're at risk) Or, ask your provider about stool tests like a FIT test every year or Cologuard test every 3 years.  To learn more about your testing options, visit: https://www.Arideas/845902.pdf.  For help making a decision, visit: https://bit.ly/sl78471.  Prostate cancer screening test: If you have a prostate and are age 55 to 69, ask your provider if you would benefit from a yearly prostate cancer screening test.  Lung cancer screening: If you are a current or former smoker age 50 to 80, ask your care team if ongoing lung cancer screenings are right for you.  For informational purposes only. Not to replace the advice of your health care provider. Copyright   2023 [employer]. All rights reserved. Clinically reviewed by the [facility] Transitions Program. [employer] 452997 - REV 01/24.  Where can  "you learn more?  Go to https://www.betNOW.net/patiented  Enter N032 in the search box to learn more about \"Learning About Stress.\"  Current as of: October 24, 2023               Content Version: 14.0    4184-2617 INPHI.   Care instructions adapted under license by your healthcare professional. If you have questions about a medical condition or this instruction, always ask your healthcare professional. [employer], [employer] disclaims any warranty or liability for your use of this information.      "

## 2024-03-20 ENCOUNTER — TELEPHONE (OUTPATIENT)
Dept: FAMILY MEDICINE | Facility: CLINIC | Age: 44
End: 2024-03-20
Payer: COMMERCIAL

## 2024-03-20 DIAGNOSIS — E11.9 CONTROLLED TYPE 2 DIABETES MELLITUS WITHOUT COMPLICATION, WITHOUT LONG-TERM CURRENT USE OF INSULIN (H): ICD-10-CM

## 2024-03-20 LAB
ANION GAP SERPL CALCULATED.3IONS-SCNC: 14 MMOL/L (ref 7–15)
BUN SERPL-MCNC: 10.7 MG/DL (ref 6–20)
CALCIUM SERPL-MCNC: 9.4 MG/DL (ref 8.6–10)
CHLORIDE SERPL-SCNC: 101 MMOL/L (ref 98–107)
CHOLEST SERPL-MCNC: 244 MG/DL
CREAT SERPL-MCNC: 0.81 MG/DL (ref 0.51–0.95)
CREAT UR-MCNC: 242 MG/DL
DEPRECATED HCO3 PLAS-SCNC: 21 MMOL/L (ref 22–29)
EGFRCR SERPLBLD CKD-EPI 2021: 90 ML/MIN/1.73M2
FASTING STATUS PATIENT QL REPORTED: NO
GLUCOSE SERPL-MCNC: 170 MG/DL (ref 70–99)
HDLC SERPL-MCNC: 51 MG/DL
LDLC SERPL CALC-MCNC: 121 MG/DL
MICROALBUMIN UR-MCNC: 77.9 MG/L
MICROALBUMIN/CREAT UR: 32.19 MG/G CR (ref 0–25)
NONHDLC SERPL-MCNC: 193 MG/DL
POTASSIUM SERPL-SCNC: 4.8 MMOL/L (ref 3.4–5.3)
SODIUM SERPL-SCNC: 136 MMOL/L (ref 135–145)
TRIGL SERPL-MCNC: 358 MG/DL

## 2024-03-20 NOTE — TELEPHONE ENCOUNTER
Retail Pharmacy Prior Authorization Team   Phone: [phone]    PA Initiation    Medication: ZEPBOUND 5 MG/0.5ML SC SOAJ  Insurance Company: [employer] ([facility]) - Phone [phone] Fax [phone]  Pharmacy Filling the Rx: [facility] [address] [place], [place] [address] - [address] [facility]  Filling Pharmacy Phone: [phone]  Filling Pharmacy Fax:    Start Date: 3/20/2024

## 2024-05-06 DIAGNOSIS — F41.9 ANXIETY: ICD-10-CM

## 2024-05-06 DIAGNOSIS — F33.0 MAJOR DEPRESSIVE DISORDER, RECURRENT EPISODE, MILD (H): ICD-10-CM

## 2024-05-06 RX ORDER — PAROXETINE HYDROCHLORIDE HEMIHYDRATE 25 MG/1
50 TABLET, FILM COATED, EXTENDED RELEASE ORAL DAILY
Qty: 180 TABLET | Refills: 0 | Status: SHIPPED | OUTPATIENT
Start: 2024-05-06 | End: 2024-08-12

## 2024-05-25 ENCOUNTER — HEALTH MAINTENANCE LETTER (OUTPATIENT)
Age: 44
End: 2024-05-25

## 2024-06-19 ENCOUNTER — TELEPHONE (OUTPATIENT)
Dept: FAMILY MEDICINE | Facility: CLINIC | Age: 44
End: 2024-06-19
Payer: COMMERCIAL

## 2024-06-19 NOTE — TELEPHONE ENCOUNTER
Prior Authorization Retail Medication Request    Medication/Dose: Mounjaro 5 mg/0.5 inj  Diagnosis and ICD code (if different than what is on RX):  Controlled type 2 diabetes mellitus without complication, without long-term current use of insulin (H) [E11.9]   New/renewal/insurance change PA/secondary ins. PA:  Previously Tried and Failed:    Rationale:      Insurance   Primary: [facility] [employer]  Insurance ID:  [id number]     Secondary (if applicable):  Insurance ID:      Pharmacy Information (if different than what is on RX)  Name:  [facility]  Phone:  [phone]  Fax:  [phone]    NGUYEN:  [id number]

## 2024-06-26 NOTE — TELEPHONE ENCOUNTER
Prior Authorization Approval    Authorization Effective Date: 6/26/2024  Authorization Expiration Date: 6/26/2025  Medication: Mounjaro 5 mg/0.5 inj-APPROVED  Reference #:     Insurance Company: [employer] ([facility]) - Phone [phone] Fax [phone]  Which Pharmacy is filling the prescription (Not needed for infusion/clinic administered): [facility] [address] [place] [address], [place] - [address] [facility]  Pharmacy Notified: Yes  Patient Notified: Instructed pharmacy to notify patient when script is ready to /ship.

## 2024-06-26 NOTE — TELEPHONE ENCOUNTER
Retail Pharmacy Prior Authorization Team   Phone: [phone]    PA Initiation    Medication: MOUNJARO 5 MG/0.5ML SC SOPN  Insurance Company: [employer] ([facility]) - Phone [phone] Fax [phone]  Pharmacy Filling the Rx: [facility] [address] [place], [place] [address] - [address] [facility]  Filling Pharmacy Phone: [phone]  Filling Pharmacy Fax:    Start Date: 6/26/2024

## 2024-08-08 ENCOUNTER — ALLIED HEALTH/NURSE VISIT (OUTPATIENT)
Dept: FAMILY MEDICINE | Facility: CLINIC | Age: 44
End: 2024-08-08
Payer: COMMERCIAL

## 2024-08-08 VITALS — DIASTOLIC BLOOD PRESSURE: 72 MMHG | SYSTOLIC BLOOD PRESSURE: 114 MMHG

## 2024-08-08 DIAGNOSIS — Z01.30 BP CHECK: Primary | ICD-10-CM

## 2024-08-08 PROCEDURE — 99207 PR NO CHARGE NURSE ONLY: CPT | Performed by: FAMILY MEDICINE

## 2024-08-08 NOTE — PROGRESS NOTES
Radha Wilson was evaluated at [facility] on August 8, 2024 at which time her blood pressure was:    BP Readings from Last 3 Encounters:   08/08/24 114/72   03/19/24 134/84   01/02/24 116/77     Pulse Readings from Last 3 Encounters:   03/19/24 100   01/02/24 75   09/25/23 101       Reviewed lifestyle modifications for blood pressure control and reduction: including making healthy food choices, managing weight, getting regular exercise, smoking cessation, reducing alcohol consumption, monitoring blood pressure regularly.     Symptoms: None    BP Goal:< 140/90 mmHg    BP Assessment:  BP at goal    Potential Reasons for BP too high: NA - Not applicable    BP Follow-Up Plan: Recheck BP in 6 months at pharmacy    Recommendation to Provider: Continue current therapy    Note completed by: Sebastian Fraga, PharmD  Float Pharmacist   [facility]  [address] [place] [id number]  P: [phone]      
Respiratory failure.

## 2024-08-12 ENCOUNTER — MYC REFILL (OUTPATIENT)
Dept: FAMILY MEDICINE | Facility: CLINIC | Age: 44
End: 2024-08-12
Payer: COMMERCIAL

## 2024-08-12 DIAGNOSIS — F33.0 MAJOR DEPRESSIVE DISORDER, RECURRENT EPISODE, MILD (H): ICD-10-CM

## 2024-08-12 DIAGNOSIS — F41.9 ANXIETY: ICD-10-CM

## 2024-08-12 RX ORDER — PAROXETINE HYDROCHLORIDE HEMIHYDRATE 25 MG/1
50 TABLET, FILM COATED, EXTENDED RELEASE ORAL DAILY
Qty: 180 TABLET | Refills: 0 | OUTPATIENT
Start: 2024-08-12

## 2024-08-12 RX ORDER — PAROXETINE HYDROCHLORIDE HEMIHYDRATE 25 MG/1
50 TABLET, FILM COATED, EXTENDED RELEASE ORAL DAILY
Qty: 180 TABLET | Refills: 1 | Status: SHIPPED | OUTPATIENT
Start: 2024-08-12

## 2024-09-11 ENCOUNTER — TRANSFERRED RECORDS (OUTPATIENT)
Dept: HEALTH INFORMATION MANAGEMENT | Facility: CLINIC | Age: 44
End: 2024-09-11
Payer: COMMERCIAL

## 2024-09-11 LAB
ALBUMIN (URINE) MG/SPEC: 4.9 UG/ML
ALBUMIN/CREATININE RATIO: 12 MG/G CREAT (ref 0–29)
ALT SERPL-CCNC: 19 IU/L (ref 0–32)
AST SERPL-CCNC: 23 IU/L (ref 0–40)
CHOLESTEROL (EXTERNAL): 243 MG/DL (ref 100–199)
CREATININE (EXTERNAL): 0.83 MG/DL (ref 0.57–1)
CREATININE (URINE): 39.9 MG/DL
GFR ESTIMATED (EXTERNAL): 88 ML/MIN/1.73M2
GLUCOSE (EXTERNAL): 134 MG/DL (ref 70–99)
HBA1C MFR BLD: 7.2 % (ref 4.8–5.6)
HDLC SERPL-MCNC: 50 MG/DL
LDL CHOLESTEROL CALCULATED (EXTERNAL): 142 MG/DL (ref 0–99)
POTASSIUM (EXTERNAL): 4.6 MMOL/L (ref 3.5–5.2)
TRIGLYCERIDES (EXTERNAL): 281 MG/DL (ref 0–149)

## 2024-09-13 ENCOUNTER — TELEPHONE (OUTPATIENT)
Dept: FAMILY MEDICINE | Facility: CLINIC | Age: 44
End: 2024-09-13
Payer: COMMERCIAL

## 2024-09-13 DIAGNOSIS — E11.9 CONTROLLED TYPE 2 DIABETES MELLITUS WITHOUT COMPLICATION, WITHOUT LONG-TERM CURRENT USE OF INSULIN (H): ICD-10-CM

## 2024-09-13 NOTE — TELEPHONE ENCOUNTER
Prior Authorization Retail Medication Request    Medication/Dose: tirzepatide (MOUNJARO) 5 MG/0.5ML pen  Diagnosis and ICD code (if different than what is on RX):    New/renewal/insurance change PA/secondary ins. PA:  Previously Tried and Failed:    Rationale:      Insurance   Primary: [facility]  Insurance ID:  [id number]     Secondary (if applicable):  Insurance ID:      Pharmacy Information (if different than what is on RX)  Name:  [facility]   Phone:    Fax:   KEY: [id number]

## 2024-09-16 ENCOUNTER — MYC MEDICAL ADVICE (OUTPATIENT)
Dept: FAMILY MEDICINE | Facility: CLINIC | Age: 44
End: 2024-09-16
Payer: COMMERCIAL

## 2024-09-17 DIAGNOSIS — E11.9 CONTROLLED TYPE 2 DIABETES MELLITUS WITHOUT COMPLICATION, WITHOUT LONG-TERM CURRENT USE OF INSULIN (H): ICD-10-CM

## 2024-09-17 RX ORDER — TIRZEPATIDE 5 MG/.5ML
INJECTION, SOLUTION SUBCUTANEOUS
Qty: 12 ML | Refills: 0 | OUTPATIENT
Start: 2024-09-17

## 2024-09-17 NOTE — TELEPHONE ENCOUNTER
Received a call back from the pharmacy.  They state they have spoken with the patient and she was trying to fill mounjaro but tried to fill the old zebound, she got confused since they have the same generic name.      Pharmacy states they need a new RX for mounjaro since they do not have any refills left.  Please send a new RX to the pharmacy.

## 2024-09-17 NOTE — TELEPHONE ENCOUNTER
Retail Pharmacy Prior Authorization Team   Phone: [phone]    PA Initiation    Medication: MOUNJARO 5 MG/0.5ML SC SOPN  Insurance Company: [employer] ([facility]) - Phone [phone] Fax [phone]  Pharmacy Filling the Rx: [facility] [address] [place], [place] - [address] [facility]  Filling Pharmacy Phone: [phone]  Filling Pharmacy Fax:    Start Date: 9/17/2024    There is a PA on file for mounjaro, tried to bring up the CMM key and it states No Matching Records.  Called pharmacy, they state they sent a PA request for Zepbound, since that is the RX received on the 8th.  I do not show that there has been any new RX's sent to the pharmacy for either medication in EPIC.      If the patient is taking the medication for Diabetes the medication that needs to be sent is Mounjaro since that is what is FDA approved.    If the patient is taking medication for weight loss that has to be Zepbound, as that is what that is FDA approved for.  These medications are not interchangeable even though they have the same generic name.    Please verify what medication the patient is to be taking and have an updated RX sent to the pharmacy and EPIC updated.

## 2024-10-14 ENCOUNTER — ANCILLARY PROCEDURE (OUTPATIENT)
Dept: MAMMOGRAPHY | Facility: CLINIC | Age: 44
End: 2024-10-14
Attending: FAMILY MEDICINE
Payer: COMMERCIAL

## 2024-10-14 DIAGNOSIS — Z12.31 VISIT FOR SCREENING MAMMOGRAM: ICD-10-CM

## 2024-10-14 PROCEDURE — 77063 BREAST TOMOSYNTHESIS BI: CPT | Mod: TC | Performed by: RADIOLOGY

## 2024-10-14 PROCEDURE — 77067 SCR MAMMO BI INCL CAD: CPT | Mod: TC | Performed by: RADIOLOGY

## 2024-10-17 ENCOUNTER — MYC MEDICAL ADVICE (OUTPATIENT)
Dept: FAMILY MEDICINE | Facility: CLINIC | Age: 44
End: 2024-10-17
Payer: COMMERCIAL

## 2024-10-21 ENCOUNTER — MYC REFILL (OUTPATIENT)
Dept: FAMILY MEDICINE | Facility: CLINIC | Age: 44
End: 2024-10-21
Payer: COMMERCIAL

## 2024-10-21 DIAGNOSIS — I10 HYPERTENSION GOAL BP (BLOOD PRESSURE) < 140/90: ICD-10-CM

## 2024-10-21 RX ORDER — LOSARTAN POTASSIUM 100 MG/1
100 TABLET ORAL DAILY
Qty: 90 TABLET | Refills: 1 | Status: SHIPPED | OUTPATIENT
Start: 2024-10-21

## 2024-10-21 RX ORDER — LOSARTAN POTASSIUM 100 MG/1
100 TABLET ORAL DAILY
Qty: 90 TABLET | Refills: 1 | OUTPATIENT
Start: 2024-10-21

## 2024-11-08 ENCOUNTER — LAB (OUTPATIENT)
Dept: LAB | Facility: CLINIC | Age: 44
End: 2024-11-08
Payer: COMMERCIAL

## 2024-11-08 DIAGNOSIS — I10 HYPERTENSION GOAL BP (BLOOD PRESSURE) < 140/90: ICD-10-CM

## 2024-11-08 DIAGNOSIS — E11.9 CONTROLLED TYPE 2 DIABETES MELLITUS WITHOUT COMPLICATION, WITHOUT LONG-TERM CURRENT USE OF INSULIN (H): ICD-10-CM

## 2024-11-08 DIAGNOSIS — E78.1 HYPERTRIGLYCERIDEMIA: ICD-10-CM

## 2024-11-08 LAB
ANION GAP SERPL CALCULATED.3IONS-SCNC: 11 MMOL/L (ref 7–15)
BUN SERPL-MCNC: 9.6 MG/DL (ref 6–20)
CALCIUM SERPL-MCNC: 9.1 MG/DL (ref 8.8–10.4)
CHLORIDE SERPL-SCNC: 106 MMOL/L (ref 98–107)
CHOLEST SERPL-MCNC: 226 MG/DL
CREAT SERPL-MCNC: 0.88 MG/DL (ref 0.51–0.95)
CREAT UR-MCNC: 227 MG/DL
EGFRCR SERPLBLD CKD-EPI 2021: 82 ML/MIN/1.73M2
EST. AVERAGE GLUCOSE BLD GHB EST-MCNC: 117 MG/DL
FASTING STATUS PATIENT QL REPORTED: YES
FASTING STATUS PATIENT QL REPORTED: YES
GLUCOSE SERPL-MCNC: 109 MG/DL (ref 70–99)
HBA1C MFR BLD: 5.7 % (ref 0–5.6)
HCO3 SERPL-SCNC: 20 MMOL/L (ref 22–29)
HDLC SERPL-MCNC: 52 MG/DL
LDLC SERPL CALC-MCNC: 128 MG/DL
MICROALBUMIN UR-MCNC: 14.3 MG/L
MICROALBUMIN/CREAT UR: 6.3 MG/G CR (ref 0–25)
NONHDLC SERPL-MCNC: 174 MG/DL
POTASSIUM SERPL-SCNC: 4.3 MMOL/L (ref 3.4–5.3)
SODIUM SERPL-SCNC: 137 MMOL/L (ref 135–145)
TRIGL SERPL-MCNC: 229 MG/DL

## 2024-11-08 PROCEDURE — 80061 LIPID PANEL: CPT

## 2024-11-08 PROCEDURE — 83036 HEMOGLOBIN GLYCOSYLATED A1C: CPT

## 2024-11-08 PROCEDURE — 36415 COLL VENOUS BLD VENIPUNCTURE: CPT

## 2024-11-08 PROCEDURE — 82043 UR ALBUMIN QUANTITATIVE: CPT

## 2024-11-08 PROCEDURE — 80048 BASIC METABOLIC PNL TOTAL CA: CPT

## 2024-11-08 PROCEDURE — 82570 ASSAY OF URINE CREATININE: CPT

## 2024-11-17 ENCOUNTER — MYC REFILL (OUTPATIENT)
Dept: FAMILY MEDICINE | Facility: CLINIC | Age: 44
End: 2024-11-17
Payer: COMMERCIAL

## 2024-11-17 DIAGNOSIS — E11.9 CONTROLLED TYPE 2 DIABETES MELLITUS WITHOUT COMPLICATION, WITHOUT LONG-TERM CURRENT USE OF INSULIN (H): ICD-10-CM

## 2024-11-18 RX ORDER — MULTIVITAMIN
TABLET ORAL DAILY
OUTPATIENT
Start: 2024-11-18

## 2024-11-18 RX ORDER — METFORMIN HYDROCHLORIDE 500 MG/1
1000 TABLET, EXTENDED RELEASE ORAL 2 TIMES DAILY
Qty: 360 TABLET | Refills: 0 | Status: SHIPPED | OUTPATIENT
Start: 2024-11-18

## 2024-12-03 DIAGNOSIS — F33.0 MAJOR DEPRESSIVE DISORDER, RECURRENT EPISODE, MILD (H): ICD-10-CM

## 2024-12-03 RX ORDER — TIRZEPATIDE 2.5 MG/.5ML
INJECTION, SOLUTION SUBCUTANEOUS
COMMUNITY
Start: 2024-12-02

## 2024-12-03 RX ORDER — BUPROPION HYDROCHLORIDE 75 MG/1
TABLET ORAL
Qty: 90 TABLET | Refills: 0 | Status: SHIPPED | OUTPATIENT
Start: 2024-12-03

## 2024-12-07 ENCOUNTER — TRANSFERRED RECORDS (OUTPATIENT)
Dept: MULTI SPECIALTY CLINIC | Facility: CLINIC | Age: 44
End: 2024-12-07

## 2024-12-07 LAB — RETINOPATHY: NORMAL

## 2025-01-13 DIAGNOSIS — F41.9 ANXIETY: ICD-10-CM

## 2025-01-13 DIAGNOSIS — F33.0 MAJOR DEPRESSIVE DISORDER, RECURRENT EPISODE, MILD: ICD-10-CM

## 2025-01-14 RX ORDER — PAROXETINE HYDROCHLORIDE HEMIHYDRATE 25 MG/1
50 TABLET, FILM COATED, EXTENDED RELEASE ORAL DAILY
Qty: 180 TABLET | Refills: 0 | Status: SHIPPED | OUTPATIENT
Start: 2025-01-14

## 2025-02-06 ENCOUNTER — ALLIED HEALTH/NURSE VISIT (OUTPATIENT)
Dept: FAMILY MEDICINE | Facility: CLINIC | Age: 45
End: 2025-02-06
Payer: COMMERCIAL

## 2025-02-06 ENCOUNTER — TRANSFERRED RECORDS (OUTPATIENT)
Dept: HEALTH INFORMATION MANAGEMENT | Facility: CLINIC | Age: 45
End: 2025-02-06

## 2025-02-06 DIAGNOSIS — Z01.30 BP CHECK: Primary | ICD-10-CM

## 2025-02-06 LAB
ALBUMIN (URINE) MG/SPEC: <3 UG/DL
ALBUMIN/CREATININE RATIO: <4 MG/G CREAT (ref 0–29)
ALT SERPL-CCNC: 19 IU/L (ref 0–32)
AST SERPL-CCNC: 15 IU/L (ref 0–40)
CHOLESTEROL (EXTERNAL): 223 MG/DL (ref 100–199)
CREATININE (EXTERNAL): 0.85 MG/DL (ref 0.57–1)
CREATININE (URINE): 67.5 MG/DL
GFR ESTIMATED (EXTERNAL): 86 ML/MIN/1.73
GLUCOSE (EXTERNAL): 107 MG/DL (ref 70–99)
HBA1C MFR BLD: 5.7 % (ref 4.8–5.6)
HDLC SERPL-MCNC: 56 MG/DL
LDL CHOLESTEROL CALCULATED (EXTERNAL): 135 MG/DL (ref 0–99)
POTASSIUM (EXTERNAL): 4.6 MMOL/L (ref 3.5–5.2)
TRIGLYCERIDES (EXTERNAL): 183 MG/DL (ref 0–149)

## 2025-02-06 NOTE — PROGRESS NOTES
Radha Wilson was evaluated at [facility] on February 6, 2025 at which time her blood pressure was:    BP Readings from Last 1 Encounters:   08/08/24 114/72     Systolic (Patient Reported): 111 (2/6/2025  1:47 PM)  Diastolic (Patient Reported): 65 (2/6/2025  1:47 PM)        Reviewed lifestyle modifications for blood pressure control and reduction: including making healthy food choices, managing weight, getting regular exercise, smoking cessation, reducing alcohol consumption, monitoring blood pressure regularly.     Symptoms: None    BP Goal:< 140/90 mmHg    BP Assessment:  BP at goal    Potential Reasons for BP too high: NA - Not applicable    BP Follow-Up Plan: Recheck BP in 6 months at pharmacy    Recommendation to Provider: Patient is regularly checking her blood pressure at home using her arm cuff BP monitor, and the average reading for the last 30 days is 111/65. Pharmacy will follow up in 6 months.     Note completed by: Edy Dunlap RPH

## 2025-02-13 DIAGNOSIS — E11.9 CONTROLLED TYPE 2 DIABETES MELLITUS WITHOUT COMPLICATION, WITHOUT LONG-TERM CURRENT USE OF INSULIN (H): ICD-10-CM

## 2025-02-13 RX ORDER — METFORMIN HYDROCHLORIDE 500 MG/1
1000 TABLET, EXTENDED RELEASE ORAL 2 TIMES DAILY
Qty: 360 TABLET | Refills: 0 | Status: SHIPPED | OUTPATIENT
Start: 2025-02-13

## 2025-02-18 ENCOUNTER — MYC REFILL (OUTPATIENT)
Dept: FAMILY MEDICINE | Facility: CLINIC | Age: 45
End: 2025-02-18
Payer: COMMERCIAL

## 2025-02-18 NOTE — TELEPHONE ENCOUNTER
CommScope message sent to patient.      Olga Ospina BSN, RN       [FreeTextEntry1] : 66yoF with:\par \par 1. Breast Cancer - Is completing course of Tamoxifen, follows with Dr. Hazel. \par \par 2. Chronic pain syndrome + CIPN - Medical cannabis re-certification completed today.  May continue with current regimen.\par \par 3. Anxiety/depression - Follows regularly with behavioral health, psych and see a therapist weekly.\par \par RTO PRN.

## 2025-02-19 NOTE — TELEPHONE ENCOUNTER
Left message on answering machine for patient to call back to [phone].  Lizet Marie BSN, RN

## 2025-02-20 RX ORDER — VALACYCLOVIR HYDROCHLORIDE 1 G/1
TABLET, FILM COATED ORAL
OUTPATIENT
Start: 2025-02-20

## 2025-03-22 SDOH — HEALTH STABILITY: PHYSICAL HEALTH: ON AVERAGE, HOW MANY MINUTES DO YOU ENGAGE IN EXERCISE AT THIS LEVEL?: 60 MIN

## 2025-03-22 SDOH — HEALTH STABILITY: PHYSICAL HEALTH: ON AVERAGE, HOW MANY DAYS PER WEEK DO YOU ENGAGE IN MODERATE TO STRENUOUS EXERCISE (LIKE A BRISK WALK)?: 2 DAYS

## 2025-03-22 ASSESSMENT — SOCIAL DETERMINANTS OF HEALTH (SDOH): HOW OFTEN DO YOU GET TOGETHER WITH FRIENDS OR RELATIVES?: ONCE A WEEK

## 2025-03-25 ENCOUNTER — OFFICE VISIT (OUTPATIENT)
Dept: FAMILY MEDICINE | Facility: CLINIC | Age: 45
End: 2025-03-25
Payer: COMMERCIAL

## 2025-03-25 VITALS
OXYGEN SATURATION: 100 % | HEIGHT: 63 IN | WEIGHT: 151.2 LBS | BODY MASS INDEX: 26.79 KG/M2 | RESPIRATION RATE: 14 BRPM | HEART RATE: 91 BPM | DIASTOLIC BLOOD PRESSURE: 74 MMHG | SYSTOLIC BLOOD PRESSURE: 116 MMHG | TEMPERATURE: 98.2 F

## 2025-03-25 DIAGNOSIS — F41.9 ANXIETY: ICD-10-CM

## 2025-03-25 DIAGNOSIS — I10 HYPERTENSION GOAL BP (BLOOD PRESSURE) < 140/90: ICD-10-CM

## 2025-03-25 DIAGNOSIS — Z00.00 ROUTINE GENERAL MEDICAL EXAMINATION AT A HEALTH CARE FACILITY: Primary | ICD-10-CM

## 2025-03-25 DIAGNOSIS — E11.9 CONTROLLED TYPE 2 DIABETES MELLITUS WITHOUT COMPLICATION, WITHOUT LONG-TERM CURRENT USE OF INSULIN (H): ICD-10-CM

## 2025-03-25 DIAGNOSIS — F33.0 MAJOR DEPRESSIVE DISORDER, RECURRENT EPISODE, MILD: ICD-10-CM

## 2025-03-25 PROCEDURE — 1125F AMNT PAIN NOTED PAIN PRSNT: CPT | Performed by: FAMILY MEDICINE

## 2025-03-25 PROCEDURE — 99396 PREV VISIT EST AGE 40-64: CPT | Performed by: FAMILY MEDICINE

## 2025-03-25 PROCEDURE — 96127 BRIEF EMOTIONAL/BEHAV ASSMT: CPT | Performed by: FAMILY MEDICINE

## 2025-03-25 PROCEDURE — G2211 COMPLEX E/M VISIT ADD ON: HCPCS | Performed by: FAMILY MEDICINE

## 2025-03-25 PROCEDURE — 3078F DIAST BP <80 MM HG: CPT | Performed by: FAMILY MEDICINE

## 2025-03-25 PROCEDURE — 3074F SYST BP LT 130 MM HG: CPT | Performed by: FAMILY MEDICINE

## 2025-03-25 PROCEDURE — 99214 OFFICE O/P EST MOD 30 MIN: CPT | Mod: 25 | Performed by: FAMILY MEDICINE

## 2025-03-25 RX ORDER — METFORMIN HYDROCHLORIDE 500 MG/1
1000 TABLET, EXTENDED RELEASE ORAL 2 TIMES DAILY
Qty: 360 TABLET | Refills: 1 | Status: SHIPPED | OUTPATIENT
Start: 2025-03-25

## 2025-03-25 RX ORDER — LOSARTAN POTASSIUM 50 MG/1
50 TABLET ORAL DAILY
Qty: 90 TABLET | Refills: 1 | Status: SHIPPED | OUTPATIENT
Start: 2025-03-25

## 2025-03-25 RX ORDER — LEVONORGESTREL AND ETHINYL ESTRADIOL 0.15-0.03
KIT ORAL
Qty: 112 TABLET | Refills: 3 | Status: SHIPPED | OUTPATIENT
Start: 2025-03-25

## 2025-03-25 RX ORDER — PAROXETINE HYDROCHLORIDE HEMIHYDRATE 25 MG/1
50 TABLET, FILM COATED, EXTENDED RELEASE ORAL DAILY
Qty: 180 TABLET | Refills: 1 | Status: SHIPPED | OUTPATIENT
Start: 2025-03-25

## 2025-03-25 ASSESSMENT — ANXIETY QUESTIONNAIRES
1. FEELING NERVOUS, ANXIOUS, OR ON EDGE: NOT AT ALL
GAD7 TOTAL SCORE: 0
7. FEELING AFRAID AS IF SOMETHING AWFUL MIGHT HAPPEN: NOT AT ALL
GAD7 TOTAL SCORE: 0
3. WORRYING TOO MUCH ABOUT DIFFERENT THINGS: NOT AT ALL
2. NOT BEING ABLE TO STOP OR CONTROL WORRYING: NOT AT ALL
IF YOU CHECKED OFF ANY PROBLEMS ON THIS QUESTIONNAIRE, HOW DIFFICULT HAVE THESE PROBLEMS MADE IT FOR YOU TO DO YOUR WORK, TAKE CARE OF THINGS AT HOME, OR GET ALONG WITH OTHER PEOPLE: NOT DIFFICULT AT ALL
5. BEING SO RESTLESS THAT IT IS HARD TO SIT STILL: NOT AT ALL
6. BECOMING EASILY ANNOYED OR IRRITABLE: NOT AT ALL

## 2025-03-25 ASSESSMENT — PATIENT HEALTH QUESTIONNAIRE - PHQ9
SUM OF ALL RESPONSES TO PHQ QUESTIONS 1-9: 1
5. POOR APPETITE OR OVEREATING: NOT AT ALL

## 2025-03-25 ASSESSMENT — PAIN SCALES - GENERAL: PAINLEVEL_OUTOF10: MILD PAIN (2)

## 2025-03-25 NOTE — PROGRESS NOTES
"Preventive Care Visit  [facility]  Katy Moore MD, Family Medicine  Mar 25, 2025      Assessment & Plan     (Z00.00) Routine general medical examination at a health care facility  (primary encounter diagnosis)  Comment: preventive needs reviewed   Plan: levonorgestrel-ethinyl estradiol (QUASENSE)         0.15-0.03 MG tablet        see orders in Epic.   Delay pap due to illness, will follow-up in 6 months for pap    (E11.9) Controlled type 2 diabetes mellitus without complication, without long-term current use of insulin (H)  Comment: well controlled   Plan: metFORMIN (GLUCOPHAGE XR) 500 MG 24 hr tablet,         tirzepatide (MOUNJARO) 5 MG/0.5ML SOAJ         auto-injector pen        Continue current meds, would like to return to 5 mg of Mounjaro    (I10) Hypertension goal BP (blood pressure) < 140/90  Comment: to goal  Plan: losartan (COZAAR) 50 MG tablet        Due to significant wt loss, decrease dose to 50 mg daily  Home bp checks after 2 weeks, send in via [employer]     (F33.0) Major depressive disorder, recurrent episode, mild  (F41.9) Anxiety  Comment: stable  Plan: PARoxetine (PAXIL-CR) 25 MG 24 hr tablet        Continue paxil and wellbutrin  Refill x 6 months     The longitudinal plan of care for the diagnosis(es)/condition(s) as documented were addressed during this visit. Due to the added complexity in care, I will continue to support Shanta in the subsequent management and with ongoing continuity of care.        BMI  Estimated body mass index is 26.78 kg/m  as calculated from the following:    Height as of this encounter: 1.6 m (5' 3\").    Weight as of this encounter: 68.6 kg (151 lb 3.2 oz).   Weight management plan: Discussed healthy diet and exercise guidelines    Counseling  Appropriate preventive services were addressed with this patient via screening, questionnaire, or discussion as appropriate for fall prevention, nutrition, physical activity, Tobacco-use cessation, social " engagement, weight loss and cognition.  Checklist reviewing preventive services available has been given to the patient.  Reviewed patient's diet, addressing concerns and/or questions.   She is at risk for lack of exercise and has been provided with information to increase physical activity for the benefit of her well-being.       See Patient Instructions    Subjective   Shanta is a 45 year old, presenting for the following:  Physical        3/25/2025    10:15 AM   Additional Questions   Roomed by Harris        Via the Health Maintenance questionnaire, the patient has reported the following services have been completed -Eye Exam: [employer] 2024-12-07, this information has been sent to the abstraction team.    HPI  Doing well, over 25 pounds lost over past 6 months using Keto and mounjaro  Battling diarrhea today and would like to postpone pap         Advance Care Planning  Patient has a Health Care Directive on file  Advance care planning document is on file and is current.      3/22/2025   General Health   How would you rate your overall physical health? Good   Feel stress (tense, anxious, or unable to sleep) Not at all         3/22/2025   Nutrition   Three or more servings of calcium each day? (!) NO   Diet: Other   If other, please elaborate: Keto   How many servings of fruit and vegetables per day? (!) 0-1   How many sweetened beverages each day? 0-1         3/22/2025   Exercise   Days per week of moderate/strenous exercise 2 days   Average minutes spent exercising at this level 60 min   (!) EXERCISE CONCERN      3/22/2025   Social Factors   Frequency of gathering with friends or relatives Once a week   Worry food won't last until get money to buy more No   Food not last or not have enough money for food? No   Do you have housing? (Housing is defined as stable permanent housing and does not include staying ouside in a car, in a tent, in an abandoned building, in an overnight shelter, or couch-surfing.) Yes    Are you worried about losing your housing? No   Lack of transportation? No   Unable to get utilities (heat,electricity)? No         3/22/2025   Dental   Dentist two times every year? Yes           3/17/2024   TB Screening   Were you born outside of the [place]? No           Today's PHQ-9 Score:       10/22/2024     2:24 AM   PHQ-9 SCORE   PHQ-9 Total Score MyChart 5 (Mild depression)   PHQ-9 Total Score 5           3/22/2025   Substance Use   Alcohol more than 3/day or more than 7/wk Not Applicable   Do you use any other substances recreationally? No     Social History     Tobacco Use    Smoking status: Never    Smokeless tobacco: Never    Tobacco comments:     Nonsmoking household   Vaping Use    Vaping status: Never Used   Substance Use Topics    Alcohol use: No     Comment: Minimal    Drug use: No           10/14/2024   LAST FHS-7 RESULTS   1st degree relative breast or ovarian cancer No   Any relative bilateral breast cancer No   Any male have breast cancer No   Any ONE woman have BOTH breast AND ovarian cancer No   Any woman with breast cancer before 50yrs No   2 or more relatives with breast AND/OR ovarian cancer No   2 or more relatives with breast AND/OR bowel cancer No        Mammogram Screening - Mammogram every 1-2 years updated in Health Maintenance based on mutual decision making    G 0 P 0   Patient's last menstrual period was 01/15/2025 (exact date).     Fasting: No   Td: tdap 2/2020       Flu: 10/2024      Covid: 11/2024      Shingrix: NA      PPV: done       RSV: NA              Cholesterol:   Lab Results   Component Value Date    CHOL 226 11/08/2024    CHOL 247 11/11/2020     Lab Results   Component Value Date    HDL 52 11/08/2024    HDL 49 11/11/2020     Lab Results   Component Value Date     11/08/2024     11/11/2020     Lab Results   Component Value Date    TRIG 183 02/06/2025    TRIG 328 11/11/2020     Lab Results   Component Value Date    CHOLHDLRATIO 4.2 11/14/2011         MMG:  10/2024  Dexa:  NA     Flex/colo: 1/2024 q10y scope      Seat Belt: Yes    Sunscreen use: Yes   Calcium Intake: adeq  Health Care Directive: Yes   Sexually Active: Yes     Current contraception: oral contraceptives  History of abnormal Pap smear: Yes: see pmh  Family history of colon/breast/ovarian cancer: No  Regular self breast exam: Yes  History of abnormal mammogram: No            3/22/2025   STI Screening   New sexual partner(s) since last STI/HIV test? No     History of abnormal Pap smear: No - age 30- 64 PAP with HPV every 5 years recommended        Latest Ref Rng & Units 2/24/2020    10:58 AM 2/24/2020    10:57 AM 1/6/2015    12:00 AM   PAP / HPV   PAP (Historical)   NIL  NIL    HPV 16 DNA NEG^Negative Negative      HPV 18 DNA NEG^Negative Negative      Other HR HPV NEG^Negative Negative        ASCVD Risk   The 10-year ASCVD risk score (Eveline ALEJANDRA, et al., 2019) is: 2.3%    Values used to calculate the score:      Age: 45 years      Sex: Female      Is Non- : No      Diabetic: Yes      Tobacco smoker: No      Systolic Blood Pressure: 116 mmHg      Is BP treated: Yes      HDL Cholesterol: 56 mg/dL      Total Cholesterol: 223 mg/dL        3/22/2025   Contraception/Family Planning   Questions about contraception or family planning No        Reviewed and updated as needed this visit by Provider   Tobacco  Allergies  Meds  Problems  Med Hx  Surg Hx  Fam Hx            BP Readings from Last 3 Encounters:   03/25/25 116/74   08/08/24 114/72   03/19/24 134/84    Wt Readings from Last 3 Encounters:   03/25/25 68.6 kg (151 lb 3.2 oz)   03/19/24 78.6 kg (173 lb 3.2 oz)   09/25/23 81.1 kg (178 lb 12.8 oz)                  Patient Active Problem List   Diagnosis    CARDIOVASCULAR SCREENING; LDL GOAL LESS THAN 160    Anxiety    Myopia    Controlled type 2 diabetes mellitus without complication, without long-term current use of insulin (H)    Major depressive disorder, recurrent  episode, mild    Hypertension goal BP (blood pressure) < 140/90     Past Surgical History:   Procedure Laterality Date    ABDOMEN SURGERY      APPENDECTOMY  5/19/12    [facility]    COLONOSCOPY WITH CO2 INSUFFLATION N/A 1/2/2024    Procedure: Colonoscopy with CO2 insufflation;  Surgeon: Temi Saleem DO;  Location: [facility] OR    EXAM OF VAGINA,COLPOSCOPY  9/20/06    Benign    HC TOOTH EXTRACTION W/FORCEP      TONSILLECTOMY & ADENOIDECTOMY         Social History     Tobacco Use    Smoking status: Never    Smokeless tobacco: Never    Tobacco comments:     Nonsmoking household   Substance Use Topics    Alcohol use: No     Comment: Minimal     Family History   Problem Relation Age of Onset    Cancer Mother         lymphoma    Other Cancer Mother         1994-current    Lipids Father     Hyperlipidemia Father     Bladder Cancer Father     Coronary Artery Disease Father         Cause of death    Other Cancer Father         Bladder    Heart Disease Maternal Grandfather         70s    Cancer - colorectal Paternal Grandmother         80s    Glaucoma Paternal Grandmother     Anesthesia Reaction Other         lung problems during appendectomy         Current Outpatient Medications   Medication Sig Dispense Refill    buPROPion (WELLBUTRIN) 75 MG tablet TAKE 1 TABLET BY MOUTH AT BEDTIME 90 tablet 1    levonorgestrel-ethinyl estradiol (QUASENSE) 0.15-0.03 MG tablet Skip week 4 of packs 1-3, then take week 4 of pack 4 112 tablet 3    losartan (COZAAR) 100 MG tablet Take 1 tablet by mouth once daily 90 tablet 1    metFORMIN (GLUCOPHAGE XR) 500 MG 24 hr tablet Take 2 tablets by mouth twice daily 360 tablet 0    MOUNJARO 2.5 MG/0.5ML SOAJ       Multiple Vitamins-Minerals (MULTIVITAMIN PO) Take 1 tablet by mouth daily      PARoxetine (PAXIL-CR) 25 MG 24 hr tablet Take 2 tablets by mouth once daily 180 tablet 0    STATIN NOT PRESCRIBED (INTENTIONAL) Please choose reason not prescribed from choices below.      valACYclovir (VALTREX)  "1000 mg tablet        Recent Labs   Lab Test 02/06/25  1910 11/08/24  1105 09/11/24  0939 03/19/24  1130 01/05/24  1142 09/25/23  1132 07/06/23  1153 03/20/23  0806 06/11/21  1031 02/12/21  1354 11/11/20  0958 02/24/20  1032 08/26/19  0730 07/28/17  1053 07/21/17  1014   A1C  --  5.7*  --   --  7.7* 8.1*   < >  --    < > 8.8* 8.3*   < > 7.2*   < >  --    LDL  --  128*  --  121*  --   --   --  96   < >  --  132*   < >  --    < > 128  Test canceled after completion  CORRECTED ON 07/24 AT 1113: PREVIOUSLY REPORTED  Above desirable:  100 129   mg/dl Borderline High:  130 159 mg/dL High:             160 189 mg/dL Very   high:       >189 mg/dl     HDL  --  52  --  51  --   --   --  47*   < >  --  49*   < >  --    < > 46*  Test canceled after completion  CORRECTED ON 07/24 AT 1113: PREVIOUSLY REPORTED AS 46     TRIG 183* 229* 281* 358*  --   --   --  291*   < >  --  328*   < >  --    < > 319*  Test canceled after completion  CORRECTED ON 07/24 AT 1113: PREVIOUSLY REPORTED  Borderline high:  150 199   mg/dl High:             200 499 mg/dl Very high:       >499 mg/dl     CR  --  0.88  --  0.81  --  0.72  --  0.72   < > 0.67 0.71   < >  --    < >  --    GFRESTIMATED  --  82  --  90  --  >90  --  >90   < > >90 >90   < >  --    < >  --    GFRESTBLACK  --   --   --   --   --   --   --   --   --  >90 >90   < >  --    < >  --    POTASSIUM  --  4.3  --  4.8  --  4.2   < > 3.9   < > 4.1 4.2   < >  --    < >  --    TSH  --   --   --   --   --   --   --   --   --   --   --   --  1.73  --  2.35    < > = values in this interval not displayed.          Review of Systems  Constitutional, neuro, ENT, endocrine, pulmonary, cardiac, gastrointestinal, genitourinary, musculoskeletal, integument and psychiatric systems are negative, except as otherwise noted.     Objective    Exam  /74   Pulse 91   Temp 98.2  F (36.8  C) (Oral)   Resp 14   Ht 1.6 m (5' 3\")   Wt 68.6 kg (151 lb 3.2 oz)   LMP 01/15/2025 (Exact Date)   " "SpO2 100%   BMI 26.78 kg/m     Estimated body mass index is 26.78 kg/m  as calculated from the following:    Height as of this encounter: 1.6 m (5' 3\").    Weight as of this encounter: 68.6 kg (151 lb 3.2 oz).    Physical Exam  GENERAL: alert and no distress  EYES: Eyes grossly normal to inspection, PERRL and conjunctivae and sclerae normal  HENT: ear canals and TM's normal, nose and mouth without ulcers or lesions  NECK: no adenopathy, no asymmetry, masses, or scars  RESP: lungs clear to auscultation - no rales, rhonchi or wheezes  BREAST: normal without masses, tenderness or nipple discharge and no palpable axillary masses or adenopathy  CV: regular rate and rhythm, normal S1 S2, no S3 or S4, no murmur, click or rub, no peripheral edema  ABDOMEN: soft, nontender, no hepatosplenomegaly, no masses and bowel sounds normal  MS: no gross musculoskeletal defects noted, no edema  SKIN: no suspicious lesions or rashes  NEURO: Normal strength and tone, mentation intact and speech normal  PSYCH: mentation appears normal, affect normal/bright  Diabetic foot exam: normal DP and PT pulses, no trophic changes or ulcerative lesions, normal sensory exam, and normal monofilament exam        Signed Electronically by: Katy Moore MD    "

## 2025-03-25 NOTE — PATIENT INSTRUCTIONS
Patient Education   Preventive Care Advice   This is general advice given by our system to help you stay healthy. However, your care team may have specific advice just for you. Please talk to your care team about your preventive care needs.  Nutrition  Eat 5 or more servings of fruits and vegetables each day.  Try wheat bread, brown rice and whole grain pasta (instead of white bread, rice, and pasta).  Get enough calcium and vitamin D. Check the label on foods and aim for 100% of the RDA (recommended daily allowance).  Lifestyle  Exercise at least 150 minutes each week  (30 minutes a day, 5 days a week).  Do muscle strengthening activities 2 days a week. These help control your weight and prevent disease.  No smoking.  Wear sunscreen to prevent skin cancer.  Have a dental exam and cleaning every 6 months.  Yearly exams  See your health care team every year to talk about:  Any changes in your health.  Any medicines your care team has prescribed.  Preventive care, family planning, and ways to prevent chronic diseases.  Shots (vaccines)   HPV shots (up to age 26), if you've never had them before.  Hepatitis B shots (up to age 59), if you've never had them before.  COVID-19 shot: Get this shot when it's due.  Flu shot: Get a flu shot every year.  Tetanus shot: Get a tetanus shot every 10 years.  Pneumococcal, hepatitis A, and RSV shots: Ask your care team if you need these based on your risk.  Shingles shot (for age 50 and up)  General health tests  Diabetes screening:  Starting at age 35, Get screened for diabetes at least every 3 years.  If you are younger than age 35, ask your care team if you should be screened for diabetes.  Cholesterol test: At age 39, start having a cholesterol test every 5 years, or more often if advised.  Bone density scan (DEXA): At age 50, ask your care team if you should have this scan for osteoporosis (brittle bones).  Hepatitis C: Get tested at least once in your life.  STIs (sexually  transmitted infections)  Before age 24: Ask your care team if you should be screened for STIs.  After age 24: Get screened for STIs if you're at risk. You are at risk for STIs (including HIV) if:  You are sexually active with more than one person.  You don't use condoms every time.  You or a partner was diagnosed with a sexually transmitted infection.  If you are at risk for HIV, ask about PrEP medicine to prevent HIV.  Get tested for HIV at least once in your life, whether you are at risk for HIV or not.  Cancer screening tests  Cervical cancer screening: If you have a cervix, begin getting regular cervical cancer screening tests starting at age 21.  Breast cancer scan (mammogram): If you've ever had breasts, begin having regular mammograms starting at age 40. This is a scan to check for breast cancer.  Colon cancer screening: It is important to start screening for colon cancer at age 45.  Have a colonoscopy test every 10 years (or more often if you're at risk) Or, ask your provider about stool tests like a FIT test every year or Cologuard test every 3 years.  To learn more about your testing options, visit:   .  For help making a decision, visit:   https://bit.ly/ux44549.  Prostate cancer screening test: If you have a prostate, ask your care team if a prostate cancer screening test (PSA) at age 55 is right for you.  Lung cancer screening: If you are a current or former smoker ages 50 to 80, ask your care team if ongoing lung cancer screenings are right for you.  For informational purposes only. Not to replace the advice of your health care provider. Copyright   2023 [employer]. All rights reserved. Clinically reviewed by the [facility] Transitions Program. [employer] [id number] - REV 01/24.

## 2025-05-24 ENCOUNTER — HEALTH MAINTENANCE LETTER (OUTPATIENT)
Age: 45
End: 2025-05-24

## 2025-06-27 ENCOUNTER — RESULTS FOLLOW-UP (OUTPATIENT)
Dept: FAMILY MEDICINE | Facility: CLINIC | Age: 45
End: 2025-06-27

## 2025-08-24 DIAGNOSIS — F33.0 MAJOR DEPRESSIVE DISORDER, RECURRENT EPISODE, MILD: ICD-10-CM

## 2025-08-25 RX ORDER — BUPROPION HYDROCHLORIDE 75 MG/1
75 TABLET ORAL AT BEDTIME
Qty: 90 TABLET | Refills: 1 | Status: SHIPPED | OUTPATIENT
Start: 2025-08-25

## (undated) DEVICE — KIT ENDO FIRST STEP DISINFECTANT 200ML W/POUCH EP-4

## (undated) DEVICE — PAD CHUX UNDERPAD 23X24" 7136

## (undated) DEVICE — PREP CHLORAPREP 26ML TINTED ORANGE  260815